# Patient Record
Sex: FEMALE | Race: WHITE | Employment: FULL TIME | URBAN - METROPOLITAN AREA
[De-identification: names, ages, dates, MRNs, and addresses within clinical notes are randomized per-mention and may not be internally consistent; named-entity substitution may affect disease eponyms.]

---

## 2018-10-13 ENCOUNTER — HOSPITAL ENCOUNTER (EMERGENCY)
Age: 43
Discharge: HOME | End: 2018-10-13
Attending: EMERGENCY MEDICINE

## 2018-10-13 VITALS
HEIGHT: 64 IN | TEMPERATURE: 97.9 F | RESPIRATION RATE: 18 BRPM | BODY MASS INDEX: 19.2 KG/M2 | SYSTOLIC BLOOD PRESSURE: 138 MMHG | HEART RATE: 79 BPM | WEIGHT: 112.43 LBS | DIASTOLIC BLOOD PRESSURE: 89 MMHG | OXYGEN SATURATION: 100 %

## 2018-10-13 DIAGNOSIS — J40 BRONCHITIS: Primary | ICD-10-CM

## 2018-10-13 DIAGNOSIS — J11.1 INFLUENZA-LIKE ILLNESS: ICD-10-CM

## 2018-10-13 LAB
RAPID INFLUENZA  B AGN: NEGATIVE
RAPID INFLUENZA A AGN: NEGATIVE

## 2018-10-13 PROCEDURE — 87804 INFLUENZA ASSAY W/OPTIC: CPT

## 2018-10-13 PROCEDURE — 99283 EMERGENCY DEPT VISIT LOW MDM: CPT

## 2018-10-13 RX ORDER — ALBUTEROL SULFATE 90 UG/1
2 AEROSOL, METERED RESPIRATORY (INHALATION) EVERY 6 HOURS PRN
Qty: 1 INHALER | Refills: 3 | Status: SHIPPED | OUTPATIENT
Start: 2018-10-13 | End: 2019-04-24 | Stop reason: ALTCHOICE

## 2018-10-13 ASSESSMENT — ENCOUNTER SYMPTOMS
COUGH: 1
EYE REDNESS: 0
RHINORRHEA: 0
SORE THROAT: 1
SHORTNESS OF BREATH: 0
ABDOMINAL PAIN: 0

## 2018-10-13 ASSESSMENT — PAIN DESCRIPTION - PAIN TYPE
TYPE: ACUTE PAIN
TYPE: ACUTE PAIN

## 2018-10-13 ASSESSMENT — PAIN DESCRIPTION - DESCRIPTORS
DESCRIPTORS: ACHING
DESCRIPTORS: ACHING

## 2018-10-13 ASSESSMENT — PAIN SCALES - GENERAL: PAINLEVEL_OUTOF10: 5

## 2018-10-13 ASSESSMENT — PAIN DESCRIPTION - PROGRESSION: CLINICAL_PROGRESSION: GRADUALLY WORSENING

## 2018-10-13 ASSESSMENT — PAIN DESCRIPTION - FREQUENCY: FREQUENCY: CONTINUOUS

## 2018-10-13 ASSESSMENT — PAIN DESCRIPTION - ONSET: ONSET: ON-GOING

## 2018-10-13 ASSESSMENT — PAIN - FUNCTIONAL ASSESSMENT: PAIN_FUNCTIONAL_ASSESSMENT: 0-10

## 2018-10-13 NOTE — ED PROVIDER NOTES
[facility]  eMERGENCY dEPARTMENT eNCOUnter      Pt Name: Cristopher Chacon  MRN: [id number]  [place] 1975  Date of evaluation: 10/13/2018  Provider: Grecia Glass MD    [address] [place] [address]       Chief Complaint   Patient presents with    Nasal Congestion     for past 3 days    Cough     congested for past 3 days    Hoarse     onset 6 days ago         HISTORY OF PRESENT ILLNESS   (Location/Symptom, Timing/Onset,Context/Setting, Quality, Duration, Modifying Factors, Severity)  Note limiting factors. Cristopher Chacon is a 39 y.o. female who presents to the emergency department with a several day history of fever, cough, sore throat, headache, body aches. She states 5-6 days ago she had a scratchy throat, lost her voice. For the last 3 days she has had fever, headache, cough, sore throat, body aches. She has not had her flu shot yet this year. She states she normally smokes one pack of cigarettes a day but has been too sick to smoke for the last few days. He denies any immunocompromised states, history of asthma, history of COPD or emphysema. Cough is non-productive. No hemoptysis. HPI    NursingNotes were reviewed. REVIEW OF SYSTEMS    (2-9 systems for level 4, 10 or more for level 5)     Review of Systems   Constitutional: Positive for fever. HENT: Positive for sore throat. Negative for rhinorrhea. Eyes: Negative for redness. Respiratory: Positive for cough. Negative for shortness of breath. Cardiovascular: Negative for chest pain. Chest sore - but only with cough   Gastrointestinal: Negative for abdominal pain. Genitourinary: Negative for flank pain. Musculoskeletal: Positive for myalgias. Neurological: Positive for headaches. Hematological: Negative for adenopathy. Psychiatric/Behavioral: Negative for confusion. Except as noted above the remainder of the review of systems was reviewed and negative.        PAST MEDICAL HISTORY patient's respiratory rate is 18 and her room air pulse ox is on her percent. At this time I do not suspect pneumonia, pleural effusion, pulmonary emboli, empyema. Her influenza test is negative and she is outside the window for treatment with Tamiflu. At this time she has a viral/influenza-like illness. No other further testing is indicated emergently. I do feel that she should treat herself symptomatically with Tylenol and Motrin for her fever and body aches. I have recommended that she stop smoking and provided her video about smoking triggers. Going to prescribe Proventil and an effort to improve her cough is most likely underlying issue causing her cough is bronchial inflammation from her viral illness and her smoking. CRITICAL CARE TIME   Total Critical Care time was 0 minutes, excluding separately reportable procedures. There was a high probability of clinically significant/life threatening deterioration in the patient's condition which required my urgent intervention. CONSULTS:  None    PROCEDURES:  Unless otherwise noted below, none     Procedures    FINAL IMPRESSION      1. Bronchitis    2. Influenza-like illness          DISPOSITION/PLAN   DISPOSITION Decision To Discharge 10/13/2018 01:31:13 PM      PATIENT REFERRED TO:  No follow-up provider specified.     DISCHARGE MEDICATIONS:  [unfilled]       (Please note that portions of this note were completed with a voice recognition program.Efforts were made to edit the dictations but occasionally words are mis-transcribed.)    Minerva Anderson MD (electronically signed)  Attending Emergency Physician          Minerva Anderson MD  10/13/18 [id number]

## 2019-04-24 ENCOUNTER — HOSPITAL ENCOUNTER (EMERGENCY)
Age: 44
Discharge: HOME | End: 2019-04-24
Attending: EMERGENCY MEDICINE

## 2019-04-24 VITALS
SYSTOLIC BLOOD PRESSURE: 131 MMHG | HEART RATE: 82 BPM | WEIGHT: 118.61 LBS | TEMPERATURE: 98.4 F | BODY MASS INDEX: 20.25 KG/M2 | RESPIRATION RATE: 15 BRPM | DIASTOLIC BLOOD PRESSURE: 95 MMHG | OXYGEN SATURATION: 94 % | HEIGHT: 64 IN

## 2019-04-24 DIAGNOSIS — S83.92XA SPRAIN OF LEFT KNEE, UNSPECIFIED LIGAMENT, INITIAL ENCOUNTER: Primary | ICD-10-CM

## 2019-04-24 PROCEDURE — 6360000002 HC RX W HCPCS: Performed by: EMERGENCY MEDICINE

## 2019-04-24 PROCEDURE — 99283 EMERGENCY DEPT VISIT LOW MDM: CPT

## 2019-04-24 RX ORDER — KETOROLAC TROMETHAMINE 30 MG/ML
30 INJECTION, SOLUTION INTRAMUSCULAR; INTRAVENOUS ONCE
Status: COMPLETED | OUTPATIENT
Start: 2019-04-24 | End: 2019-04-24

## 2019-04-24 RX ORDER — NAPROXEN 500 MG/1
500 TABLET ORAL 2 TIMES DAILY
Qty: 30 TABLET | Refills: 0 | Status: SHIPPED | OUTPATIENT
Start: 2019-04-24 | End: 2019-07-23 | Stop reason: ALTCHOICE

## 2019-04-24 RX ADMIN — KETOROLAC TROMETHAMINE 30 MG: 30 INJECTION, SOLUTION INTRAMUSCULAR; INTRAVENOUS at 10:40

## 2019-04-24 ASSESSMENT — PAIN DESCRIPTION - LOCATION: LOCATION: KNEE

## 2019-04-24 ASSESSMENT — PAIN DESCRIPTION - FREQUENCY: FREQUENCY: CONTINUOUS

## 2019-04-24 ASSESSMENT — PAIN SCALES - GENERAL
PAINLEVEL_OUTOF10: 5
PAINLEVEL_OUTOF10: 7
PAINLEVEL_OUTOF10: 7

## 2019-04-24 ASSESSMENT — PAIN DESCRIPTION - DESCRIPTORS: DESCRIPTORS: THROBBING;BURNING

## 2019-04-24 ASSESSMENT — PAIN DESCRIPTION - PAIN TYPE: TYPE: ACUTE PAIN

## 2019-04-24 ASSESSMENT — PAIN DESCRIPTION - ORIENTATION: ORIENTATION: RIGHT

## 2019-04-24 NOTE — ED TRIAGE NOTES
Pt fell over vacuum  cord yesterday and started to fall, pt caught herself and twisted her right knee in the process. No swelling or bruising noted. Pt able to bear weight on right leg.

## 2019-04-24 NOTE — ED PROVIDER NOTES
Emergency Department provider note:    [facility]    CHIEF COMPLAINT  Knee Injury (Left knee injury last night, pt fell over vacuum cord and caught herself and twisted knee)      HISTORY OF PRESENT ILLNESS  Bayron Bains is a 38 y.o. female who presents to the ED with an injury to her left knee. The patient states she's had problems with that torn ACL in that left knee, which she originally did 10 years ago. She's had no surgical interventions. Last night she was at home and caught her foot in an electric cord from her vacuum . She twisted the knee. This morning her knees hurting so much she is having difficulty walking. She is concerned because she needs a work note. She took some ibuprofen last night before going to bed, but hasn't had anything for pain this morning. Her pains a 6 out of 10. The pain is sharp in nature and increases when she tries to flex or extend the knee. It does not radiate. No distal numbness or paresthesias. She's had a prior history of a C6 fracture, and the strain of her left knee in the past.  She's had no other orthopedic problems. She is able to bear weight. No other complaints, modifying factors or associated symptoms. Nursing notes reviewed.    Past Medical History:   Diagnosis Date    Arthritis     h/o C6 fx, bulging disc on back    Chronic kidney disease     High blood pressure     Neck fracture (HCC)     Stroke [facility])      Past Surgical History:   Procedure Laterality Date    ABDOMEN SURGERY      exploratory     Family History   Problem Relation Age of Onset    Cancer Maternal Aunt         breast/lung    Arthritis Other     Diabetes Other     High Blood Pressure Other      Social History     Socioeconomic History    Marital status: Single     Spouse name: Not on file    Number of children: 2    Years of education: Not on file    Highest education level: Not on file   Occupational History    She works as a . NA   Social Needs    Financial resource strain: Not on file    Food insecurity:     Worry: Not on file     Inability: Not on file    Transportation needs:     Medical: Not on file     Non-medical: Not on file   Tobacco Use    Smoking status: Current Every Day Smoker     Packs/day: 1.00     Years: 20.00     Pack years: 20.00     Types: Cigarettes   Substance and Sexual Activity    Alcohol use: No    Drug use: No    Sexual activity: Yes     Partners: Male   Lifestyle    Physical activity:     Days per week: Not on file     Minutes per session: Not on file    Stress: Not on file   Relationships    Social connections:     Talks on phone: Not on file     Gets together: Not on file     Attends Congregational service: Not on file     Active member of club or organization: Not on file     Attends meetings of clubs or organizations: Not on file     Relationship status: Not on file    Intimate partner violence:     Fear of current or ex partner: Not on file     Emotionally abused: Not on file     Physically abused: Not on file     Forced sexual activity: Not on file   Other Topics Concern    Not on file   Social History Narrative    Not on file     No current facility-administered medications for this encounter. Current Outpatient Medications   Medication Sig Dispense Refill    naproxen (NAPROSYN) 500 MG tablet Take 1 tablet by mouth 2 times daily 30 tablet 0    ibuprofen (ADVIL;MOTRIN) 200 MG tablet Take 200 mg by mouth every 6 hours as needed for Pain       Allergies   Allergen Reactions    Tramadol Nausea Only       REVIEW OF SYSTEMS  10 systems reviewed, pertinent positives per HPI otherwise noted to be negative    PHYSICAL EXAM  BP (!) 131/95   Pulse 82   Temp 98.4 °F (36.9 °C) (Oral)   Resp 15   Ht 5' 4\" (1.626 m)   Wt 118 lb 9.7 oz (53.8 kg)   SpO2 94%   BMI 20.36 kg/m²   GENERAL APPEARANCE: Awake and alert. Cooperative. No acute distress. Arrived ambulatory with a limp.   HEAD: Normocephalic. Atraumatic. EYES: EOM's grossly intact. ENT: Mucous membranes are moist.   NECK: Supple. Normal ROM. CHEST: Equal symmetric chest rise. LUNGS: Breathing is unlabored. Speaking comfortably in full sentences. HEART:  Regular rhythm. ABDOMEN: Nondistended. No masses. EXTREMITIES: Moves actively. No acute deformities. Her pain is limited to her left knee area. I see no signs of trauma. She's got a little swelling along the patellar tendon and the lower pole medial aspect of the patella. That is the site of her pain. She does not want to flex or extend her knee. There is no trauma to the knee, no joint effusion. She has no crepitance when I palpate any of the bones of the knee or the patella. Palpation of the edge of the patella down onto the medial aspect of the patellar tendon reproduces her pain. Having her flex in that area produces a little crepitance. Again there is no joint effusion. She has no tenderness along the collateral ligaments. She doesn't have any gross instability anterior or posterior, but I cannot evaluate it fully due to her pain. Distal pulses, capillary refill, and sensation are all intact. SKIN: Warm and dry. NEUROLOGICAL: Alert and oriented. Strength is 5/5 in all extremities and sensation is intact. LABS  No results found for this visit on 04/24/19. RADIOLOGY  No orders to display         ED COURSE/MDM  Patient seen and evaluated. Patient was evaluated and found to have a tendinitis or strain along the left patellar tendon. She also may have some irritation of the cartilage in that area and I explained that to the patient. She has no indication of a collateral ligament strain, nor an ACL strain. She has no gross laxity and no joint effusion. I think she will be more comfortable if she has a knee brace in place. She is one of those in the past and it helped her.   Advised to use heat and will give her an injection of an anti-inflammatory pain medicine, and a prescription for Naprosyn to use at home. I think she needs follow up with orthopedic surgery which she has not done in the past.  I gave her follow-up to our orthopedic surgeon on call, Dr. Clemente Garcia. She can use the splint for the next couple weeks with heat. A return to work note is given for tomorrow. I considered potential for bony injury or ligamentous tear. Neither are present. Patient was given:   Medications   ketorolac (TORADOL) injection 30 mg (30 mg Intramuscular Given 4/24/19 1040)       Pt is to follow up in 7 days for a recheck. Patient was given scripts for the following medications. I counseled patient how to take these medications. New Prescriptions    NAPROXEN (NAPROSYN) 500 MG TABLET    Take 1 tablet by mouth 2 times daily           CLINICAL IMPRESSION  1. Sprain of left knee, unspecified ligament, initial encounter        Blood pressure (!) 131/95, pulse 82, temperature 98.4 °F (36.9 °C), temperature source Oral, resp. rate 15, height 5' 4\" (1.626 m), weight 118 lb 9.7 oz (53.8 kg), SpO2 94 %, not currently breastfeeding. DISPOSITION Decision To Discharge 04/24/2019 10:29:03 AM      Comment: Please note this report has been produced using speech recognition software and may contain errors related to that system including errors in grammar, punctuation, and spelling, as well as words and phrases that may be inappropriate. If there are any questions or concerns please feel free to contact the dictating provider for clarification.         Joelle Bocanegra MD  04/24/19 0246 [address], [place]  04/24/19 5834

## 2019-07-23 ENCOUNTER — HOSPITAL ENCOUNTER (OUTPATIENT)
Dept: GENERAL RADIOLOGY | Age: 44
Discharge: HOME | End: 2019-07-23
Payer: COMMERCIAL

## 2019-07-23 ENCOUNTER — OFFICE VISIT (OUTPATIENT)
Dept: INTERNAL MEDICINE CLINIC | Age: 44
End: 2019-07-23
Payer: COMMERCIAL

## 2019-07-23 ENCOUNTER — HOSPITAL ENCOUNTER (OUTPATIENT)
Age: 44
Discharge: HOME | End: 2019-07-23
Payer: COMMERCIAL

## 2019-07-23 VITALS
OXYGEN SATURATION: 98 % | HEART RATE: 86 BPM | BODY MASS INDEX: 19.84 KG/M2 | WEIGHT: 116.2 LBS | SYSTOLIC BLOOD PRESSURE: 128 MMHG | HEIGHT: 64 IN | DIASTOLIC BLOOD PRESSURE: 84 MMHG | TEMPERATURE: 98.5 F

## 2019-07-23 DIAGNOSIS — Z72.0 TOBACCO ABUSE: ICD-10-CM

## 2019-07-23 DIAGNOSIS — S83.512A RUPTURE OF ANTERIOR CRUCIATE LIGAMENT OF LEFT KNEE, INITIAL ENCOUNTER: ICD-10-CM

## 2019-07-23 DIAGNOSIS — M19.90 ARTHRITIS: ICD-10-CM

## 2019-07-23 DIAGNOSIS — M19.90 ARTHRITIS: Primary | ICD-10-CM

## 2019-07-23 DIAGNOSIS — M77.11 LATERAL EPICONDYLITIS OF RIGHT ELBOW: ICD-10-CM

## 2019-07-23 LAB
BASOPHILS ABSOLUTE: 0.1 K/UL (ref 0–0.2)
BASOPHILS RELATIVE PERCENT: 0.6 %
EOSINOPHILS ABSOLUTE: 0.1 K/UL (ref 0–0.6)
EOSINOPHILS RELATIVE PERCENT: 1.3 %
HCT VFR BLD CALC: 40.1 % (ref 36–48)
HEMOGLOBIN: 13.4 G/DL (ref 12–16)
LYMPHOCYTES ABSOLUTE: 1.8 K/UL (ref 1–5.1)
LYMPHOCYTES RELATIVE PERCENT: 20.1 %
MCH RBC QN AUTO: 32.8 PG (ref 26–34)
MCHC RBC AUTO-ENTMCNC: 33.5 G/DL (ref 31–36)
MCV RBC AUTO: 98 FL (ref 80–100)
MONOCYTES ABSOLUTE: 0.4 K/UL (ref 0–1.3)
MONOCYTES RELATIVE PERCENT: 4.5 %
NEUTROPHILS ABSOLUTE: 6.6 K/UL (ref 1.7–7.7)
NEUTROPHILS RELATIVE PERCENT: 73.5 %
PDW BLD-RTO: 13.8 % (ref 12.4–15.4)
PLATELET # BLD: 217 K/UL (ref 135–450)
PMV BLD AUTO: 8.2 FL (ref 5–10.5)
RBC # BLD: 4.09 M/UL (ref 4–5.2)
RHEUMATOID FACTOR: <10 IU/ML
SEDIMENTATION RATE, ERYTHROCYTE: 8 MM/HR (ref 0–20)
TSH SERPL DL<=0.05 MIU/L-ACNC: 1.24 UIU/ML (ref 0.27–4.2)
WBC # BLD: 9 K/UL (ref 4–11)

## 2019-07-23 PROCEDURE — 85025 COMPLETE CBC W/AUTO DIFF WBC: CPT

## 2019-07-23 PROCEDURE — G8427 DOCREV CUR MEDS BY ELIG CLIN: HCPCS | Performed by: INTERNAL MEDICINE

## 2019-07-23 PROCEDURE — 86038 ANTINUCLEAR ANTIBODIES: CPT

## 2019-07-23 PROCEDURE — 73130 X-RAY EXAM OF HAND: CPT

## 2019-07-23 PROCEDURE — 85652 RBC SED RATE AUTOMATED: CPT

## 2019-07-23 PROCEDURE — G8420 CALC BMI NORM PARAMETERS: HCPCS | Performed by: INTERNAL MEDICINE

## 2019-07-23 PROCEDURE — 84443 ASSAY THYROID STIM HORMONE: CPT

## 2019-07-23 PROCEDURE — 86431 RHEUMATOID FACTOR QUANT: CPT

## 2019-07-23 PROCEDURE — 36415 COLL VENOUS BLD VENIPUNCTURE: CPT

## 2019-07-23 PROCEDURE — 4004F PT TOBACCO SCREEN RCVD TLK: CPT | Performed by: INTERNAL MEDICINE

## 2019-07-23 PROCEDURE — 99204 OFFICE O/P NEW MOD 45 MIN: CPT | Performed by: INTERNAL MEDICINE

## 2019-07-23 ASSESSMENT — PATIENT HEALTH QUESTIONNAIRE - PHQ9
SUM OF ALL RESPONSES TO PHQ9 QUESTIONS 1 & 2: 2
SUM OF ALL RESPONSES TO PHQ QUESTIONS 1-9: 2
2. FEELING DOWN, DEPRESSED OR HOPELESS: 2
SUM OF ALL RESPONSES TO PHQ QUESTIONS 1-9: 2
1. LITTLE INTEREST OR PLEASURE IN DOING THINGS: 0

## 2019-07-23 ASSESSMENT — ENCOUNTER SYMPTOMS
COUGH: 1
GASTROINTESTINAL NEGATIVE: 1
SHORTNESS OF BREATH: 1

## 2019-07-23 NOTE — PROGRESS NOTES
Comment: breast/lung  Problem: Arthritis      Relation: Other          Age of Onset: (Not Specified)  Problem: Diabetes      Relation: Other          Age of Onset: (Not Specified)  Problem: High Blood Pressure      Relation: Other          Age of Onset: (Not Specified)  Problem: Stroke      Relation: Sister          Age of Onset: 27  Problem: Cancer      Relation: Sister          Age of Onset: (Not Specified)          Comment: Lupus  Problem: Heart Disease      Relation: Brother          Age of Onset: (Not Specified)  Problem: Heart Attack      Relation: Brother          Age of Onset: (Not Specified)  Problem: COPD      Relation: Maternal Grandmother          Age of Onset: (Not Specified)  Problem: No Known Problems      Relation: Maternal Grandfather          Age of Onset: (Not Specified)  Problem: No Known Problems      Relation: Paternal Grandmother          Age of Onset: (Not Specified)  Problem: No Known Problems      Relation: Paternal Grandfather          Age of Onset: (Not Specified)  Problem: Heart Disease      Relation: Sister          Age of Onset: (Not Specified)  Problem: Breast Cancer      Relation: Maternal Aunt          Age of Onset: (Not Specified)    Social History    Tobacco Use      Smoking status: Current Every Day Smoker        Packs/day: 1.00        Years: 20.00        Pack years: 20        Types: Cigarettes      Smokeless tobacco: Never Used    Alcohol use: No      Hand Pain    The incident occurred more than 1 week ago. There was no injury mechanism. The pain is present in the right hand, right fingers, left fingers and left hand. The quality of the pain is described as aching. The pain is moderate. The pain has been constant since the incident. She has tried NSAIDs for the symptoms. The treatment provided no relief. Knee Pain    The incident occurred more than 1 week ago. The injury mechanism was a direct blow. The pain is present in the left knee.  The quality of the pain is described as aching. The pain is moderate. The pain has been constant since onset. Review of Systems   Constitutional: Negative for appetite change. HENT: Negative. Respiratory: Positive for cough and shortness of breath. Cardiovascular: Negative. Gastrointestinal: Negative. Genitourinary: Negative. Musculoskeletal: Positive for arthralgias and neck pain. Skin: Negative. Neurological: Negative. Objective:   Physical Exam   HENT:   Head: Normocephalic. Right Ear: External ear normal.   Left Ear: External ear normal.   Nose: Nose normal.   Mouth/Throat: Oropharynx is clear and moist. No oropharyngeal exudate. Eyes: Pupils are equal, round, and reactive to light. Conjunctivae and EOM are normal. No scleral icterus. Neck: Normal range of motion. No thyromegaly present. Cardiovascular: Normal rate and regular rhythm. No murmur heard. Pulmonary/Chest: Effort normal. She has decreased breath sounds. She has no wheezes. Abdominal: Soft. Bowel sounds are normal. There is no tenderness. Musculoskeletal: She exhibits no edema or tenderness. Rt elbow lateral epichodylitis. Lt knee painful tender and sore. Lymphadenopathy:     She has no cervical adenopathy. Neurological: She is alert. Skin: Skin is warm. She is not diaphoretic. Assessment:      Encounter Diagnoses   Name Primary?  Arthritis Yes    Rupture of anterior cruciate ligament of left knee, initial encounter     Lateral epicondylitis of right elbow     Tobacco abuse            Plan:      Nikhil Gallego was seen today for established new doctor, swelling, hand pain, knee pain and elbow pain. Diagnoses and all orders for this visit:    Arthritis  -     JOSE ROBERTO profile  -     Rheumatoid Factor  -     Sedimentation Rate; Future  -     TSH without Reflex  -     CBC with Differential  -     XR HAND RIGHT (MIN 3 VIEWS); Future  -     XR HAND LEFT (MIN 3 VIEWS); Future  -     diclofenac (VOLTAREN) 50 MG EC tablet;  Take

## 2019-07-24 ENCOUNTER — OFFICE VISIT (OUTPATIENT)
Dept: ORTHOPEDIC SURGERY | Age: 44
End: 2019-07-24
Payer: COMMERCIAL

## 2019-07-24 VITALS
BODY MASS INDEX: 19.81 KG/M2 | HEART RATE: 73 BPM | SYSTOLIC BLOOD PRESSURE: 151 MMHG | DIASTOLIC BLOOD PRESSURE: 102 MMHG | WEIGHT: 116 LBS | HEIGHT: 64 IN

## 2019-07-24 DIAGNOSIS — S83.512A RUPTURE OF ANTERIOR CRUCIATE LIGAMENT OF LEFT KNEE, INITIAL ENCOUNTER: Primary | ICD-10-CM

## 2019-07-24 DIAGNOSIS — M25.562 ACUTE PAIN OF LEFT KNEE: ICD-10-CM

## 2019-07-24 LAB — ANTI-NUCLEAR ANTIBODY (ANA): NEGATIVE

## 2019-07-24 PROCEDURE — G8420 CALC BMI NORM PARAMETERS: HCPCS | Performed by: ORTHOPAEDIC SURGERY

## 2019-07-24 PROCEDURE — 99243 OFF/OP CNSLTJ NEW/EST LOW 30: CPT | Performed by: ORTHOPAEDIC SURGERY

## 2019-07-24 PROCEDURE — G8427 DOCREV CUR MEDS BY ELIG CLIN: HCPCS | Performed by: ORTHOPAEDIC SURGERY

## 2019-07-24 NOTE — PROGRESS NOTES
Aunt        Social History     Socioeconomic History    Marital status: Single     Spouse name: None    Number of children: 2    Years of education: None    Highest education level: None   Occupational History    Occupation: NA   Social Needs    Financial resource strain: None    Food insecurity:     Worry: None     Inability: None    Transportation needs:     Medical: None     Non-medical: None   Tobacco Use    Smoking status: Current Every Day Smoker     Packs/day: 1.00     Years: 20.00     Pack years: 20.00     Types: Cigarettes    Smokeless tobacco: Never Used   Substance and Sexual Activity    Alcohol use: No    Drug use: Not Currently    Sexual activity: Yes     Partners: Male   Lifestyle    Physical activity:     Days per week: None     Minutes per session: None    Stress: None   Relationships    Social connections:     Talks on phone: None     Gets together: None     Attends Baptism service: None     Active member of club or organization: None     Attends meetings of clubs or organizations: None     Relationship status: None    Intimate partner violence:     Fear of current or ex partner: None     Emotionally abused: None     Physically abused: None     Forced sexual activity: None   Other Topics Concern    None   Social History Narrative    None       Current Outpatient Medications   Medication Sig Dispense Refill    diclofenac (VOLTAREN) 50 MG EC tablet Take 1 tablet by mouth 2 times daily 60 tablet 3    ibuprofen (ADVIL;MOTRIN) 200 MG tablet Take 200 mg by mouth every 6 hours as needed for Pain       No current facility-administered medications for this visit. Allergies   Allergen Reactions    Tramadol Nausea Only       Review of Systems:  Pertinent items are noted in HPI. 13 point review of systems from Patient History Form dated 7/24/19 and available in the patient's chart under the Media tab.         Physical Examination:     Vital signs:   BP (!) 157/102   Pulse 73 consider ACL reconstruction, she will need to quit smoking. Follow up after MRI.

## 2019-08-01 ENCOUNTER — HOSPITAL ENCOUNTER (OUTPATIENT)
Dept: MRI IMAGING | Age: 44
Discharge: HOME | End: 2019-08-01
Payer: COMMERCIAL

## 2019-08-01 DIAGNOSIS — S83.512A RUPTURE OF ANTERIOR CRUCIATE LIGAMENT OF LEFT KNEE, INITIAL ENCOUNTER: ICD-10-CM

## 2019-08-01 PROCEDURE — 73721 MRI JNT OF LWR EXTRE W/O DYE: CPT

## 2019-08-05 ENCOUNTER — OFFICE VISIT (OUTPATIENT)
Dept: ORTHOPEDIC SURGERY | Age: 44
End: 2019-08-05
Payer: COMMERCIAL

## 2019-08-05 VITALS
HEIGHT: 64 IN | BODY MASS INDEX: 19.81 KG/M2 | RESPIRATION RATE: 16 BRPM | SYSTOLIC BLOOD PRESSURE: 135 MMHG | WEIGHT: 116 LBS | DIASTOLIC BLOOD PRESSURE: 91 MMHG

## 2019-08-05 DIAGNOSIS — S83.512A RUPTURE OF ANTERIOR CRUCIATE LIGAMENT OF LEFT KNEE, INITIAL ENCOUNTER: Primary | ICD-10-CM

## 2019-08-05 DIAGNOSIS — Z72.0 TOBACCO USE: ICD-10-CM

## 2019-08-05 PROCEDURE — L1832 KO ADJ JNT POS R SUP PRE CST: HCPCS | Performed by: ORTHOPAEDIC SURGERY

## 2019-08-05 PROCEDURE — 4004F PT TOBACCO SCREEN RCVD TLK: CPT | Performed by: ORTHOPAEDIC SURGERY

## 2019-08-05 PROCEDURE — G8427 DOCREV CUR MEDS BY ELIG CLIN: HCPCS | Performed by: ORTHOPAEDIC SURGERY

## 2019-08-05 PROCEDURE — 99214 OFFICE O/P EST MOD 30 MIN: CPT | Performed by: ORTHOPAEDIC SURGERY

## 2019-08-05 PROCEDURE — G8420 CALC BMI NORM PARAMETERS: HCPCS | Performed by: ORTHOPAEDIC SURGERY

## 2019-08-05 NOTE — PROGRESS NOTES
surgery, it can take three months, average is six to nine months, and it can be up to a year depending on the patient's commitment to PT post-op. We discussed the higher likelihood of suboptimal outcomes if the patient is noncompliant with postoperative rehabilitation. Plan for left knee arthroscopy, ACL reconstruction with Achilles allograft        Procedures    Breg T-Scope Knee Brace     Patient was prescribed a Breg T-Scope Knee Brace. The left knee will require stabilization / immobilization from this semi-rigid / rigid orthosis to improve their function. The orthosis will assist in protecting the affected area, provide functional support and facilitate healing. The prefabricated orthosis was modified in the following manner to provide a customizable fit for the patient at the time of delivery. 1.  Identification of appropriate positioning and alignment of anatomical landmarks. 2.  Trimming of straps and adjustment of frame to specifically fit patient. 3.  Polycentric hinge adjustment in flexion and extension. The patient was educated and fit by a healthcare professional with expert knowledge and specialization in brace application while under the direct supervision of the treating physician. Verbal and written instructions for the use of and application of this item were provided. They were instructed to contact the office immediately should the brace result in increased pain, decreased sensation, increased swelling or worsening of the condition. Provided for day of surgery. The patient will see their PCP for H&P and clearance for surgery and for smoking cessation.

## 2019-08-08 ENCOUNTER — TELEPHONE (OUTPATIENT)
Dept: ORTHOPEDIC SURGERY | Age: 44
End: 2019-08-08

## 2019-08-15 ENCOUNTER — OFFICE VISIT (OUTPATIENT)
Dept: INTERNAL MEDICINE CLINIC | Age: 44
End: 2019-08-15
Payer: COMMERCIAL

## 2019-08-15 VITALS
SYSTOLIC BLOOD PRESSURE: 122 MMHG | WEIGHT: 119 LBS | HEIGHT: 64 IN | TEMPERATURE: 98.1 F | HEART RATE: 68 BPM | DIASTOLIC BLOOD PRESSURE: 80 MMHG | BODY MASS INDEX: 20.32 KG/M2 | OXYGEN SATURATION: 95 %

## 2019-08-15 DIAGNOSIS — Z72.0 TOBACCO ABUSE: ICD-10-CM

## 2019-08-15 DIAGNOSIS — G89.29 CHRONIC PAIN OF LEFT KNEE: ICD-10-CM

## 2019-08-15 DIAGNOSIS — M25.562 CHRONIC PAIN OF LEFT KNEE: ICD-10-CM

## 2019-08-15 DIAGNOSIS — Z01.818 PREOPERATIVE EXAMINATION: Primary | ICD-10-CM

## 2019-08-15 DIAGNOSIS — J42 CHRONIC BRONCHITIS, UNSPECIFIED CHRONIC BRONCHITIS TYPE (HCC): ICD-10-CM

## 2019-08-15 PROCEDURE — 3023F SPIROM DOC REV: CPT | Performed by: INTERNAL MEDICINE

## 2019-08-15 PROCEDURE — G8427 DOCREV CUR MEDS BY ELIG CLIN: HCPCS | Performed by: INTERNAL MEDICINE

## 2019-08-15 PROCEDURE — 4004F PT TOBACCO SCREEN RCVD TLK: CPT | Performed by: INTERNAL MEDICINE

## 2019-08-15 PROCEDURE — G8420 CALC BMI NORM PARAMETERS: HCPCS | Performed by: INTERNAL MEDICINE

## 2019-08-15 PROCEDURE — G8926 SPIRO NO PERF OR DOC: HCPCS | Performed by: INTERNAL MEDICINE

## 2019-08-15 PROCEDURE — 99214 OFFICE O/P EST MOD 30 MIN: CPT | Performed by: INTERNAL MEDICINE

## 2019-08-15 RX ORDER — VARENICLINE TARTRATE 25 MG
KIT ORAL
Qty: 1 BOX | Refills: 3 | Status: SHIPPED | OUTPATIENT
Start: 2019-08-15 | End: 2019-09-11

## 2019-08-15 ASSESSMENT — ENCOUNTER SYMPTOMS
SINUS PRESSURE: 1
SHORTNESS OF BREATH: 1
GASTROINTESTINAL NEGATIVE: 1

## 2019-08-15 NOTE — PATIENT INSTRUCTIONS
Please call your pharmacy if you need any refills of your medication(s). Please call our office at [phone] if you don't hear from us about your test results. Bring an accurate list of your medications with you at every appointment to ensure that we have the correct information.     Our office hours are: Monday - Friday 8:30 am- 5 pm    Phone lines turn on at 8:30 am

## 2019-08-29 ENCOUNTER — NURSE TRIAGE (OUTPATIENT)
Dept: OTHER | Facility: CLINIC | Age: 44
End: 2019-08-29

## 2019-08-29 ENCOUNTER — OFFICE VISIT (OUTPATIENT)
Dept: INTERNAL MEDICINE CLINIC | Age: 44
End: 2019-08-29
Payer: COMMERCIAL

## 2019-08-29 VITALS
WEIGHT: 119.38 LBS | BODY MASS INDEX: 20.49 KG/M2 | DIASTOLIC BLOOD PRESSURE: 78 MMHG | OXYGEN SATURATION: 97 % | TEMPERATURE: 98 F | HEART RATE: 73 BPM | SYSTOLIC BLOOD PRESSURE: 114 MMHG

## 2019-08-29 DIAGNOSIS — H60.392 INFECTION OF LEFT EARLOBE: Primary | ICD-10-CM

## 2019-08-29 PROCEDURE — G8420 CALC BMI NORM PARAMETERS: HCPCS | Performed by: INTERNAL MEDICINE

## 2019-08-29 PROCEDURE — 99213 OFFICE O/P EST LOW 20 MIN: CPT | Performed by: INTERNAL MEDICINE

## 2019-08-29 PROCEDURE — 4130F TOPICAL PREP RX AOE: CPT | Performed by: INTERNAL MEDICINE

## 2019-08-29 PROCEDURE — G8427 DOCREV CUR MEDS BY ELIG CLIN: HCPCS | Performed by: INTERNAL MEDICINE

## 2019-08-29 PROCEDURE — 4004F PT TOBACCO SCREEN RCVD TLK: CPT | Performed by: INTERNAL MEDICINE

## 2019-08-29 RX ORDER — DOXYCYCLINE HYCLATE 100 MG/1
100 CAPSULE ORAL 2 TIMES DAILY
Qty: 14 CAPSULE | Refills: 0 | Status: SHIPPED | OUTPATIENT
Start: 2019-08-29 | End: 2019-09-05

## 2019-08-29 ASSESSMENT — ENCOUNTER SYMPTOMS
STRIDOR: 0
EYES NEGATIVE: 1
COLOR CHANGE: 1
SINUS PRESSURE: 0
COUGH: 1
SHORTNESS OF BREATH: 1
BACK PAIN: 0
CHOKING: 0
APNEA: 0

## 2019-08-29 NOTE — PROGRESS NOTES
of breath. Negative for apnea, choking and stridor. Genitourinary: Negative for hematuria. Musculoskeletal: Negative for back pain. Skin: Positive for color change and wound. Negative for pallor. Left ear lobe. Neurological: Negative for dizziness, tremors, seizures and syncope. Hematological: Does not bruise/bleed easily. Psychiatric/Behavioral: Negative for confusion, decreased concentration and dysphoric mood. Objective:   Physical Exam   HENT:   Head: Normocephalic. Right Ear: External ear normal.   Left Ear: External ear normal.   Nose: Nose normal.   Mouth/Throat: Oropharynx is clear and moist. No oropharyngeal exudate. Eyes: Pupils are equal, round, and reactive to light. Conjunctivae and EOM are normal. No scleral icterus. Neck: Normal range of motion. No thyromegaly present. Cardiovascular: Normal rate and regular rhythm. No murmur heard. Pulmonary/Chest: Effort normal. She has decreased breath sounds. She has no wheezes. She has rhonchi. Abdominal: Soft. Bowel sounds are normal. There is no tenderness. Musculoskeletal: She exhibits no edema or tenderness. Lymphadenopathy:     She has no cervical adenopathy. Neurological: She is alert. Skin: Skin is warm. She is not diaphoretic. Assessment:      Encounter Diagnosis   Name Primary?  Infection of left earlobe Yes           Plan:      Chelsea Berry was seen today for mass. Diagnoses and all orders for this visit:    Infection of left earlobe    Other orders  -     doxycycline hyclate (VIBRAMYCIN) 100 MG capsule;  Take 1 capsule by mouth 2 times daily for 7 days              Thai Mann MD

## 2019-09-12 ENCOUNTER — ANESTHESIA EVENT (OUTPATIENT)
Dept: OPERATING ROOM | Age: 44
End: 2019-09-12
Payer: COMMERCIAL

## 2019-09-13 ENCOUNTER — ANESTHESIA (OUTPATIENT)
Dept: OPERATING ROOM | Age: 44
End: 2019-09-13
Payer: COMMERCIAL

## 2019-09-13 ENCOUNTER — HOSPITAL ENCOUNTER (OUTPATIENT)
Age: 44
Setting detail: OUTPATIENT SURGERY
Discharge: HOME | End: 2019-09-13
Attending: ORTHOPAEDIC SURGERY | Admitting: ORTHOPAEDIC SURGERY
Payer: COMMERCIAL

## 2019-09-13 VITALS
BODY MASS INDEX: 20.7 KG/M2 | SYSTOLIC BLOOD PRESSURE: 155 MMHG | RESPIRATION RATE: 18 BRPM | HEIGHT: 64 IN | TEMPERATURE: 97.4 F | OXYGEN SATURATION: 97 % | WEIGHT: 121.25 LBS | HEART RATE: 79 BPM | DIASTOLIC BLOOD PRESSURE: 100 MMHG

## 2019-09-13 VITALS
SYSTOLIC BLOOD PRESSURE: 90 MMHG | OXYGEN SATURATION: 100 % | DIASTOLIC BLOOD PRESSURE: 56 MMHG | RESPIRATION RATE: 6 BRPM | TEMPERATURE: 97 F

## 2019-09-13 DIAGNOSIS — S83.512A RUPTURE OF ANTERIOR CRUCIATE LIGAMENT OF LEFT KNEE, INITIAL ENCOUNTER: Primary | ICD-10-CM

## 2019-09-13 LAB — PREGNANCY, URINE: NEGATIVE

## 2019-09-13 PROCEDURE — 7100000000 HC PACU RECOVERY - FIRST 15 MIN: Performed by: ORTHOPAEDIC SURGERY

## 2019-09-13 PROCEDURE — 3700000000 HC ANESTHESIA ATTENDED CARE: Performed by: ORTHOPAEDIC SURGERY

## 2019-09-13 PROCEDURE — 2500000003 HC RX 250 WO HCPCS: Performed by: NURSE ANESTHETIST, CERTIFIED REGISTERED

## 2019-09-13 PROCEDURE — 3600000014 HC SURGERY LEVEL 4 ADDTL 15MIN: Performed by: ORTHOPAEDIC SURGERY

## 2019-09-13 PROCEDURE — 64447 NJX AA&/STRD FEMORAL NRV IMG: CPT | Performed by: ANESTHESIOLOGY

## 2019-09-13 PROCEDURE — 3600000004 HC SURGERY LEVEL 4 BASE: Performed by: ORTHOPAEDIC SURGERY

## 2019-09-13 PROCEDURE — 7100000001 HC PACU RECOVERY - ADDTL 15 MIN: Performed by: ORTHOPAEDIC SURGERY

## 2019-09-13 PROCEDURE — G0480 DRUG TEST DEF 1-7 CLASSES: HCPCS

## 2019-09-13 PROCEDURE — C1762 CONN TISS, HUMAN(INC FASCIA): HCPCS | Performed by: ORTHOPAEDIC SURGERY

## 2019-09-13 PROCEDURE — 7100000010 HC PHASE II RECOVERY - FIRST 15 MIN: Performed by: ORTHOPAEDIC SURGERY

## 2019-09-13 PROCEDURE — 2709999900 HC NON-CHARGEABLE SUPPLY: Performed by: ORTHOPAEDIC SURGERY

## 2019-09-13 PROCEDURE — 2580000003 HC RX 258: Performed by: ORTHOPAEDIC SURGERY

## 2019-09-13 PROCEDURE — 3700000001 HC ADD 15 MINUTES (ANESTHESIA): Performed by: ORTHOPAEDIC SURGERY

## 2019-09-13 PROCEDURE — C1713 ANCHOR/SCREW BN/BN,TIS/BN: HCPCS | Performed by: ORTHOPAEDIC SURGERY

## 2019-09-13 PROCEDURE — 84703 CHORIONIC GONADOTROPIN ASSAY: CPT

## 2019-09-13 PROCEDURE — 2580000003 HC RX 258: Performed by: ANESTHESIOLOGY

## 2019-09-13 PROCEDURE — 6360000002 HC RX W HCPCS: Performed by: NURSE ANESTHETIST, CERTIFIED REGISTERED

## 2019-09-13 PROCEDURE — 6360000002 HC RX W HCPCS: Performed by: ANESTHESIOLOGY

## 2019-09-13 PROCEDURE — 2720000010 HC SURG SUPPLY STERILE: Performed by: ORTHOPAEDIC SURGERY

## 2019-09-13 PROCEDURE — 29881 ARTHRS KNE SRG MNISECTMY M/L: CPT | Performed by: ORTHOPAEDIC SURGERY

## 2019-09-13 PROCEDURE — 2580000003 HC RX 258: Performed by: NURSE ANESTHETIST, CERTIFIED REGISTERED

## 2019-09-13 PROCEDURE — 29888 ARTHRS AID ACL RPR/AGMNTJ: CPT | Performed by: ORTHOPAEDIC SURGERY

## 2019-09-13 PROCEDURE — 7100000011 HC PHASE II RECOVERY - ADDTL 15 MIN: Performed by: ORTHOPAEDIC SURGERY

## 2019-09-13 DEVICE — ANCHOR SUT 4.75X22MM PEEK DBL LD SWIVELOCK W/ TIGERTAIL: Type: IMPLANTABLE DEVICE | Site: KNEE | Status: FUNCTIONAL

## 2019-09-13 DEVICE — FAST-FIX 360 CURVED MENISCAL                                    REPAIR SYSTEM
Type: IMPLANTABLE DEVICE | Site: LEG | Status: FUNCTIONAL
Brand: FAST-FIX

## 2019-09-13 DEVICE — ANCHOR SFT TISS W/ DEPLOYING SUT BTB TIGHTROPE: Type: IMPLANTABLE DEVICE | Site: LEG | Status: FUNCTIONAL

## 2019-09-13 DEVICE — GRAFT HUM TISS L160MM ACHILLES TEND FRZN W/ BNE BLK: Type: IMPLANTABLE DEVICE | Site: LEG | Status: FUNCTIONAL

## 2019-09-13 DEVICE — SCREW INTFR L20MM DIA9MM BIOCOMPOSITE FASTTHREAD: Type: IMPLANTABLE DEVICE | Site: KNEE | Status: FUNCTIONAL

## 2019-09-13 RX ORDER — SODIUM CHLORIDE 0.9 % (FLUSH) 0.9 %
10 SYRINGE (ML) INJECTION PRN
Status: DISCONTINUED | OUTPATIENT
Start: 2019-09-13 | End: 2019-09-13 | Stop reason: HOSPADM

## 2019-09-13 RX ORDER — OXYCODONE HYDROCHLORIDE AND ACETAMINOPHEN 5; 325 MG/1; MG/1
1 TABLET ORAL PRN
Status: DISCONTINUED | OUTPATIENT
Start: 2019-09-13 | End: 2019-09-13 | Stop reason: HOSPADM

## 2019-09-13 RX ORDER — FENTANYL CITRATE 50 UG/ML
25 INJECTION, SOLUTION INTRAMUSCULAR; INTRAVENOUS EVERY 5 MIN PRN
Status: COMPLETED | OUTPATIENT
Start: 2019-09-13 | End: 2019-09-13

## 2019-09-13 RX ORDER — OXYCODONE HYDROCHLORIDE AND ACETAMINOPHEN 5; 325 MG/1; MG/1
1 TABLET ORAL EVERY 4 HOURS PRN
Qty: 40 TABLET | Refills: 0 | Status: SHIPPED | OUTPATIENT
Start: 2019-09-13 | End: 2019-09-20

## 2019-09-13 RX ORDER — POVIDONE-IODINE 10 MG/G
OINTMENT TOPICAL
Status: COMPLETED | OUTPATIENT
Start: 2019-09-13 | End: 2019-09-13

## 2019-09-13 RX ORDER — DEXAMETHASONE SODIUM PHOSPHATE 4 MG/ML
INJECTION, SOLUTION INTRA-ARTICULAR; INTRALESIONAL; INTRAMUSCULAR; INTRAVENOUS; SOFT TISSUE PRN
Status: DISCONTINUED | OUTPATIENT
Start: 2019-09-13 | End: 2019-09-13 | Stop reason: SDUPTHER

## 2019-09-13 RX ORDER — PROPOFOL 10 MG/ML
INJECTION, EMULSION INTRAVENOUS PRN
Status: DISCONTINUED | OUTPATIENT
Start: 2019-09-13 | End: 2019-09-13 | Stop reason: SDUPTHER

## 2019-09-13 RX ORDER — ESMOLOL HYDROCHLORIDE 10 MG/ML
INJECTION INTRAVENOUS PRN
Status: DISCONTINUED | OUTPATIENT
Start: 2019-09-13 | End: 2019-09-13 | Stop reason: SDUPTHER

## 2019-09-13 RX ORDER — SODIUM CHLORIDE 0.9 % (FLUSH) 0.9 %
10 SYRINGE (ML) INJECTION EVERY 12 HOURS SCHEDULED
Status: DISCONTINUED | OUTPATIENT
Start: 2019-09-13 | End: 2019-09-13 | Stop reason: HOSPADM

## 2019-09-13 RX ORDER — SUCCINYLCHOLINE/SOD CL,ISO/PF 200MG/10ML
SYRINGE (ML) INTRAVENOUS PRN
Status: DISCONTINUED | OUTPATIENT
Start: 2019-09-13 | End: 2019-09-13 | Stop reason: SDUPTHER

## 2019-09-13 RX ORDER — MIDAZOLAM HYDROCHLORIDE 1 MG/ML
INJECTION INTRAMUSCULAR; INTRAVENOUS PRN
Status: DISCONTINUED | OUTPATIENT
Start: 2019-09-13 | End: 2019-09-13 | Stop reason: SDUPTHER

## 2019-09-13 RX ORDER — SODIUM CHLORIDE 9 MG/ML
INJECTION, SOLUTION INTRAVENOUS CONTINUOUS
Status: DISCONTINUED | OUTPATIENT
Start: 2019-09-13 | End: 2019-09-13 | Stop reason: HOSPADM

## 2019-09-13 RX ORDER — OXYCODONE HYDROCHLORIDE AND ACETAMINOPHEN 5; 325 MG/1; MG/1
2 TABLET ORAL PRN
Status: DISCONTINUED | OUTPATIENT
Start: 2019-09-13 | End: 2019-09-13 | Stop reason: HOSPADM

## 2019-09-13 RX ORDER — MAGNESIUM HYDROXIDE 1200 MG/15ML
LIQUID ORAL CONTINUOUS PRN
Status: COMPLETED | OUTPATIENT
Start: 2019-09-13 | End: 2019-09-13

## 2019-09-13 RX ORDER — FENTANYL CITRATE 50 UG/ML
INJECTION, SOLUTION INTRAMUSCULAR; INTRAVENOUS PRN
Status: DISCONTINUED | OUTPATIENT
Start: 2019-09-13 | End: 2019-09-13 | Stop reason: SDUPTHER

## 2019-09-13 RX ORDER — LABETALOL HYDROCHLORIDE 5 MG/ML
INJECTION, SOLUTION INTRAVENOUS PRN
Status: DISCONTINUED | OUTPATIENT
Start: 2019-09-13 | End: 2019-09-13 | Stop reason: SDUPTHER

## 2019-09-13 RX ORDER — ONDANSETRON 2 MG/ML
4 INJECTION INTRAMUSCULAR; INTRAVENOUS
Status: DISCONTINUED | OUTPATIENT
Start: 2019-09-13 | End: 2019-09-13 | Stop reason: HOSPADM

## 2019-09-13 RX ORDER — ROPIVACAINE HYDROCHLORIDE 5 MG/ML
INJECTION, SOLUTION EPIDURAL; INFILTRATION; PERINEURAL
Status: COMPLETED | OUTPATIENT
Start: 2019-09-13 | End: 2019-09-13

## 2019-09-13 RX ORDER — LIDOCAINE HYDROCHLORIDE 20 MG/ML
INJECTION, SOLUTION EPIDURAL; INFILTRATION; INTRACAUDAL; PERINEURAL PRN
Status: DISCONTINUED | OUTPATIENT
Start: 2019-09-13 | End: 2019-09-13 | Stop reason: SDUPTHER

## 2019-09-13 RX ORDER — 0.9 % SODIUM CHLORIDE 0.9 %
VIAL (ML) INJECTION PRN
Status: DISCONTINUED | OUTPATIENT
Start: 2019-09-13 | End: 2019-09-13 | Stop reason: SDUPTHER

## 2019-09-13 RX ORDER — ONDANSETRON 2 MG/ML
INJECTION INTRAMUSCULAR; INTRAVENOUS PRN
Status: DISCONTINUED | OUTPATIENT
Start: 2019-09-13 | End: 2019-09-13 | Stop reason: SDUPTHER

## 2019-09-13 RX ORDER — FENTANYL CITRATE 50 UG/ML
25 INJECTION, SOLUTION INTRAMUSCULAR; INTRAVENOUS EVERY 5 MIN PRN
Status: DISCONTINUED | OUTPATIENT
Start: 2019-09-13 | End: 2019-09-13 | Stop reason: HOSPADM

## 2019-09-13 RX ORDER — PROMETHAZINE HYDROCHLORIDE 25 MG/1
25 TABLET ORAL EVERY 6 HOURS PRN
Qty: 5 TABLET | Refills: 0 | Status: SHIPPED | OUTPATIENT
Start: 2019-09-13 | End: 2019-11-05

## 2019-09-13 RX ORDER — GLYCOPYRROLATE 0.2 MG/ML
INJECTION INTRAMUSCULAR; INTRAVENOUS PRN
Status: DISCONTINUED | OUTPATIENT
Start: 2019-09-13 | End: 2019-09-13 | Stop reason: SDUPTHER

## 2019-09-13 RX ORDER — VECURONIUM BROMIDE 1 MG/ML
INJECTION, POWDER, LYOPHILIZED, FOR SOLUTION INTRAVENOUS PRN
Status: DISCONTINUED | OUTPATIENT
Start: 2019-09-13 | End: 2019-09-13 | Stop reason: SDUPTHER

## 2019-09-13 RX ADMIN — VECURONIUM BROMIDE 2 MG: 1 INJECTION, POWDER, LYOPHILIZED, FOR SOLUTION INTRAVENOUS at 08:31

## 2019-09-13 RX ADMIN — FENTANYL CITRATE 25 MCG: 50 INJECTION INTRAMUSCULAR; INTRAVENOUS at 10:07

## 2019-09-13 RX ADMIN — FENTANYL CITRATE 50 MCG: 50 INJECTION INTRAMUSCULAR; INTRAVENOUS at 07:16

## 2019-09-13 RX ADMIN — VECURONIUM BROMIDE 8 MG: 1 INJECTION, POWDER, LYOPHILIZED, FOR SOLUTION INTRAVENOUS at 07:24

## 2019-09-13 RX ADMIN — SODIUM CHLORIDE: 9 INJECTION, SOLUTION INTRAVENOUS at 06:57

## 2019-09-13 RX ADMIN — HYDROMORPHONE HYDROCHLORIDE 0.5 MG: 1 INJECTION, SOLUTION INTRAMUSCULAR; INTRAVENOUS; SUBCUTANEOUS at 09:10

## 2019-09-13 RX ADMIN — SODIUM CHLORIDE 8 ML: 9 INJECTION INTRAMUSCULAR; INTRAVENOUS; SUBCUTANEOUS at 07:24

## 2019-09-13 RX ADMIN — PROPOFOL 200 MG: 10 INJECTION, EMULSION INTRAVENOUS at 07:21

## 2019-09-13 RX ADMIN — ONDANSETRON 4 MG: 2 INJECTION INTRAMUSCULAR; INTRAVENOUS at 07:25

## 2019-09-13 RX ADMIN — FENTANYL CITRATE 25 MCG: 50 INJECTION INTRAMUSCULAR; INTRAVENOUS at 10:25

## 2019-09-13 RX ADMIN — HYDROMORPHONE HYDROCHLORIDE 1 MG: 1 INJECTION, SOLUTION INTRAMUSCULAR; INTRAVENOUS; SUBCUTANEOUS at 08:23

## 2019-09-13 RX ADMIN — LABETALOL HYDROCHLORIDE 10 MG: 5 INJECTION, SOLUTION INTRAVENOUS at 09:02

## 2019-09-13 RX ADMIN — LIDOCAINE HYDROCHLORIDE 100 MG: 20 INJECTION, SOLUTION EPIDURAL; INFILTRATION; INTRACAUDAL; PERINEURAL at 07:21

## 2019-09-13 RX ADMIN — Medication 100 MG: at 07:21

## 2019-09-13 RX ADMIN — ESMOLOL HYDROCHLORIDE 30 MG: 100 INJECTION, SOLUTION INTRAVENOUS at 08:55

## 2019-09-13 RX ADMIN — HYDROMORPHONE HYDROCHLORIDE 0.5 MG: 1 INJECTION, SOLUTION INTRAMUSCULAR; INTRAVENOUS; SUBCUTANEOUS at 08:51

## 2019-09-13 RX ADMIN — HYDROMORPHONE HYDROCHLORIDE 0.5 MG: 1 INJECTION, SOLUTION INTRAMUSCULAR; INTRAVENOUS; SUBCUTANEOUS at 11:01

## 2019-09-13 RX ADMIN — SUGAMMADEX 150 MG: 100 INJECTION, SOLUTION INTRAVENOUS at 09:16

## 2019-09-13 RX ADMIN — ESMOLOL HYDROCHLORIDE 30 MG: 100 INJECTION, SOLUTION INTRAVENOUS at 08:50

## 2019-09-13 RX ADMIN — ROPIVACAINE HYDROCHLORIDE 30 ML: 5 INJECTION, SOLUTION EPIDURAL; INFILTRATION; PERINEURAL at 07:14

## 2019-09-13 RX ADMIN — GLYCOPYRROLATE 0.2 MG: 0.2 INJECTION, SOLUTION INTRAMUSCULAR; INTRAVENOUS at 07:25

## 2019-09-13 RX ADMIN — FENTANYL CITRATE 25 MCG: 50 INJECTION INTRAMUSCULAR; INTRAVENOUS at 09:58

## 2019-09-13 RX ADMIN — FENTANYL CITRATE 50 MCG: 50 INJECTION INTRAMUSCULAR; INTRAVENOUS at 07:21

## 2019-09-13 RX ADMIN — DEXAMETHASONE SODIUM PHOSPHATE 8 MG: 4 INJECTION, SOLUTION INTRAMUSCULAR; INTRAVENOUS at 07:25

## 2019-09-13 RX ADMIN — MIDAZOLAM 2 MG: 1 INJECTION INTRAMUSCULAR; INTRAVENOUS at 07:16

## 2019-09-13 RX ADMIN — SODIUM CHLORIDE: 9 INJECTION, SOLUTION INTRAVENOUS at 09:16

## 2019-09-13 RX ADMIN — HYDROMORPHONE HYDROCHLORIDE 0.5 MG: 1 INJECTION, SOLUTION INTRAMUSCULAR; INTRAVENOUS; SUBCUTANEOUS at 10:42

## 2019-09-13 RX ADMIN — FENTANYL CITRATE 25 MCG: 50 INJECTION INTRAMUSCULAR; INTRAVENOUS at 10:15

## 2019-09-13 RX ADMIN — SODIUM CHLORIDE 2 ML: 9 INJECTION INTRAMUSCULAR; INTRAVENOUS; SUBCUTANEOUS at 08:31

## 2019-09-13 ASSESSMENT — PAIN DESCRIPTION - ORIENTATION
ORIENTATION: LEFT

## 2019-09-13 ASSESSMENT — PAIN - FUNCTIONAL ASSESSMENT
PAIN_FUNCTIONAL_ASSESSMENT: PREVENTS OR INTERFERES SOME ACTIVE ACTIVITIES AND ADLS
PAIN_FUNCTIONAL_ASSESSMENT: 0-10
PAIN_FUNCTIONAL_ASSESSMENT: PREVENTS OR INTERFERES SOME ACTIVE ACTIVITIES AND ADLS
PAIN_FUNCTIONAL_ASSESSMENT: PREVENTS OR INTERFERES SOME ACTIVE ACTIVITIES AND ADLS

## 2019-09-13 ASSESSMENT — PULMONARY FUNCTION TESTS
PIF_VALUE: 16
PIF_VALUE: 15
PIF_VALUE: 14
PIF_VALUE: 15
PIF_VALUE: 14
PIF_VALUE: 14
PIF_VALUE: 15
PIF_VALUE: 14
PIF_VALUE: 4
PIF_VALUE: 14
PIF_VALUE: 26
PIF_VALUE: 15
PIF_VALUE: 14
PIF_VALUE: 13
PIF_VALUE: 2
PIF_VALUE: 15
PIF_VALUE: 10
PIF_VALUE: 5
PIF_VALUE: 13
PIF_VALUE: 15
PIF_VALUE: 14
PIF_VALUE: 12
PIF_VALUE: 23
PIF_VALUE: 14
PIF_VALUE: 15
PIF_VALUE: 13
PIF_VALUE: 15
PIF_VALUE: 14
PIF_VALUE: 14
PIF_VALUE: 0
PIF_VALUE: 14
PIF_VALUE: 3
PIF_VALUE: 15
PIF_VALUE: 3
PIF_VALUE: 15
PIF_VALUE: 4
PIF_VALUE: 12
PIF_VALUE: 15
PIF_VALUE: 5
PIF_VALUE: 15
PIF_VALUE: 1
PIF_VALUE: 13
PIF_VALUE: 5
PIF_VALUE: 13
PIF_VALUE: 14
PIF_VALUE: 14
PIF_VALUE: 15
PIF_VALUE: 14
PIF_VALUE: 13
PIF_VALUE: 15
PIF_VALUE: 14
PIF_VALUE: 15
PIF_VALUE: 14
PIF_VALUE: 14
PIF_VALUE: 15
PIF_VALUE: 14
PIF_VALUE: 15
PIF_VALUE: 1
PIF_VALUE: 12
PIF_VALUE: 15
PIF_VALUE: 14
PIF_VALUE: 15
PIF_VALUE: 5
PIF_VALUE: 14
PIF_VALUE: 13
PIF_VALUE: 14
PIF_VALUE: 16
PIF_VALUE: 14
PIF_VALUE: 12
PIF_VALUE: 15
PIF_VALUE: 14
PIF_VALUE: 15
PIF_VALUE: 14
PIF_VALUE: 16
PIF_VALUE: 14
PIF_VALUE: 15
PIF_VALUE: 16
PIF_VALUE: 14
PIF_VALUE: 14
PIF_VALUE: 13
PIF_VALUE: 16
PIF_VALUE: 15
PIF_VALUE: 14
PIF_VALUE: 15
PIF_VALUE: 14
PIF_VALUE: 15
PIF_VALUE: 14
PIF_VALUE: 12
PIF_VALUE: 14
PIF_VALUE: 12
PIF_VALUE: 1
PIF_VALUE: 15
PIF_VALUE: 14
PIF_VALUE: 13
PIF_VALUE: 1
PIF_VALUE: 15
PIF_VALUE: 5

## 2019-09-13 ASSESSMENT — PAIN DESCRIPTION - PROGRESSION
CLINICAL_PROGRESSION: GRADUALLY WORSENING
CLINICAL_PROGRESSION: NOT CHANGED
CLINICAL_PROGRESSION: NOT CHANGED
CLINICAL_PROGRESSION: GRADUALLY IMPROVING
CLINICAL_PROGRESSION: GRADUALLY WORSENING
CLINICAL_PROGRESSION: GRADUALLY IMPROVING

## 2019-09-13 ASSESSMENT — PAIN DESCRIPTION - PAIN TYPE
TYPE: SURGICAL PAIN

## 2019-09-13 ASSESSMENT — PAIN DESCRIPTION - DESCRIPTORS
DESCRIPTORS: ACHING;DISCOMFORT
DESCRIPTORS: PRESSURE
DESCRIPTORS: ACHING
DESCRIPTORS: ACHING;DISCOMFORT
DESCRIPTORS: ACHING;DISCOMFORT
DESCRIPTORS: PRESSURE
DESCRIPTORS: ACHING;DISCOMFORT
DESCRIPTORS: ACHING;PRESSURE
DESCRIPTORS: ACHING;DISCOMFORT
DESCRIPTORS: ACHING

## 2019-09-13 ASSESSMENT — PAIN DESCRIPTION - LOCATION
LOCATION: KNEE

## 2019-09-13 ASSESSMENT — PAIN DESCRIPTION - ONSET
ONSET: ON-GOING
ONSET: AWAKENED FROM SLEEP
ONSET: ON-GOING
ONSET: ON-GOING

## 2019-09-13 ASSESSMENT — PAIN DESCRIPTION - FREQUENCY
FREQUENCY: CONTINUOUS
FREQUENCY: INTERMITTENT
FREQUENCY: CONTINUOUS

## 2019-09-13 ASSESSMENT — LIFESTYLE VARIABLES: SMOKING_STATUS: 0

## 2019-09-13 ASSESSMENT — PAIN SCALES - GENERAL
PAINLEVEL_OUTOF10: 5
PAINLEVEL_OUTOF10: 6
PAINLEVEL_OUTOF10: 6
PAINLEVEL_OUTOF10: 5
PAINLEVEL_OUTOF10: 7

## 2019-09-13 ASSESSMENT — ENCOUNTER SYMPTOMS: SHORTNESS OF BREATH: 0

## 2019-09-14 NOTE — OP NOTE
A  Nitinol wire was inserted. We then inserted Arthrex 9 x 20 mm  BioComposite Delta interference screw. We then drilled for SwiveLock  distally about 2 to 3 cm distal to our tibial tunnel. We then loaded  our FiberTape through the [address] [place] [address] Street, which was a PEEK SwiveLock and then  placed this into the tibia with tension on our FiberTape and then fully  inserted the SwiveLock to complete our internal brace. The excess FiberTape was then cut. The excess graft was then cut and we  copiously irrigated the wound with normal saline solution. The tibial  incision was closed with 2-0 Vicryl suture subcutaneously in a simple  inverted interrupted fashion. The skin was closed with 4-0 Monocryl in  a running subcuticular fashion and the portal sites were closed with 2-0  Prolene in a simple interrupted fashion. Steri-Strips were then  applied. Betadine ointment, 2x2 gauze, Tegaderm dressing were then  applied as well as sterile Webril, and an Ace wrap and then hinged knee  brace locked at 20 degrees of flexion. The tourniquet was then deflated  at 86 minutes. The patient was awoken from anesthesia, transferred onto her hospital  cart, and transported to the PACU for recovery. She tolerated the  procedure well without any complications. PLAN:  The patient will be recovered in the PACU and then be discharged  home. She is touch-down weightbearing for the next two weeks. She will  be in the brace for about four to six weeks until she regains normal  gait with adequate quad strength. She was given a prescription for  Percocet as well as Phenergan for nausea and vomiting. She was  instructed to ice her knee continuously throughout the weekend with  careful instructions to make sure the ice does not directly contact her  skin to avoid the potential for frostbite.   She will follow up in the  office in three to four days for a wound check as well as review of her  arthroscopic pictures with her and start her on physical therapy.         Fozia Durán MD    D: 09/13/2019 9:34:00       T: 09/13/2019 11:17:46     [email or web address]  Job#: [id number]     Doc#: [id number]    CC:

## 2019-09-16 ENCOUNTER — OFFICE VISIT (OUTPATIENT)
Dept: ORTHOPEDIC SURGERY | Age: 44
End: 2019-09-16

## 2019-09-16 VITALS — RESPIRATION RATE: 16 BRPM | WEIGHT: 121.25 LBS | HEIGHT: 64 IN | BODY MASS INDEX: 20.7 KG/M2

## 2019-09-16 DIAGNOSIS — S83.242A OTHER TEAR OF MEDIAL MENISCUS OF LEFT KNEE AS CURRENT INJURY, INITIAL ENCOUNTER: ICD-10-CM

## 2019-09-16 DIAGNOSIS — S83.512A RUPTURE OF ANTERIOR CRUCIATE LIGAMENT OF LEFT KNEE, INITIAL ENCOUNTER: Primary | ICD-10-CM

## 2019-09-16 LAB
3-OH-COTININE: 50 NG/ML
COTININE: 60 NG/ML
NICOTINE: <2 NG/ML

## 2019-09-16 PROCEDURE — 99024 POSTOP FOLLOW-UP VISIT: CPT | Performed by: ORTHOPAEDIC SURGERY

## 2019-09-17 ENCOUNTER — TELEPHONE (OUTPATIENT)
Dept: ORTHOPEDIC SURGERY | Age: 44
End: 2019-09-17

## 2019-09-17 NOTE — TELEPHONE ENCOUNTER
She cannot have a refill. She was given 40 tablets to last for 1 week only 4 days ago on 9/13/19 (the day of surgery). Has she taken all 40 tablets? If so, that is in complete disregard of the prescription, and per [facility] [place] opioid prescribing laws, I should not continue to prescribe her narcotics then.

## 2019-09-19 ENCOUNTER — HOSPITAL ENCOUNTER (OUTPATIENT)
Dept: PHYSICAL THERAPY | Age: 44
Setting detail: THERAPIES SERIES
Discharge: HOME | End: 2019-09-19
Payer: COMMERCIAL

## 2019-09-19 PROCEDURE — G0283 ELEC STIM OTHER THAN WOUND: HCPCS

## 2019-09-19 PROCEDURE — 97161 PT EVAL LOW COMPLEX 20 MIN: CPT

## 2019-09-19 PROCEDURE — 97110 THERAPEUTIC EXERCISES: CPT

## 2019-09-19 NOTE — PROGRESS NOTES
use)  Bathroom Toilet: Standard  Home Equipment: Crutches  Active : Yes(Pt's father drove her to PT evaluation today. Pt stated she was instructed by Dr. Radha Gómez that she can drive )  Occupation: Unemployed  Type of occupation: Pt was working at [employer] as a   Leisure & Hobbies: Yard work  IADL Comments: \"I can't do anything right now\"    Objective  Observation: Gait: with 2 crutches, NWB, with brace donned. Stand to plinth transfer: pt required assistance of UEs to pull leg onto treatment table. Pt removed her brace and ace bandage independently and after collecting objective data donned ace bandage independently appropriately. Edema: Superior patellar pole L 35.0, R 31.8; inferior patellar pole L 32.8, R 30.3. AROM L knee flexion -3-38; PROM -2-50. Strength and ROMRLE: WFL  Strength LLE  L Ankle Dorsiflexion: 4+/5  L Ankle Plantar Flexion: 4+/5  L Ankle Inversion: 4/5  L Ankle Eversion: 4/5  L Great Toe Extension: 4/5  Tone: glutes B WFL, quads L poor, R good; VMO L poor, R good. Other: HEP: ankle pumps, quad set    Assessment   Conditions Requiring Skilled Therapeutic Intervention  Body structures, Functions, Activity limitations: Decreased functional mobility ; Decreased ADL status; Decreased ROM; Decreased strength; Increased Pain  Assessment: Pt presented to PT 6 days s/p L ACL reconstruction. PLOF: independent and active.   Treatment Diagnosis: Decreased functional mobility  Prognosis: Good  Decision Making: Low Complexity  REQUIRES PT FOLLOW UP: Yes  Activity Tolerance  Activity Tolerance: Patient limited by pain         Plan   Plan  Times per week: PT 1-2x/week for 4-6 weeks  Current Treatment Recommendations: Strengthening, ROM, Home Exercise Program, Manual Therapy - Soft Tissue Mobilization, Manual Therapy - Joint Manipulation, Modalities    OutComes Score  LEFS Total Score: 19 (09/19/19 1001)    Goals  Short term goals  Time Frame for Short term goals: 1 week  Short term goal 1: Pt will

## 2019-09-20 ENCOUNTER — TELEPHONE (OUTPATIENT)
Dept: ORTHOPEDIC SURGERY | Age: 44
End: 2019-09-20

## 2019-09-23 ENCOUNTER — HOSPITAL ENCOUNTER (OUTPATIENT)
Dept: PHYSICAL THERAPY | Age: 44
Setting detail: THERAPIES SERIES
Discharge: HOME | End: 2019-09-23
Payer: COMMERCIAL

## 2019-09-23 ENCOUNTER — TELEPHONE (OUTPATIENT)
Dept: ORTHOPEDIC SURGERY | Age: 44
End: 2019-09-23

## 2019-09-23 DIAGNOSIS — S83.512A RUPTURE OF ANTERIOR CRUCIATE LIGAMENT OF LEFT KNEE, INITIAL ENCOUNTER: Primary | ICD-10-CM

## 2019-09-23 PROCEDURE — 97110 THERAPEUTIC EXERCISES: CPT

## 2019-09-23 PROCEDURE — G0283 ELEC STIM OTHER THAN WOUND: HCPCS

## 2019-09-23 RX ORDER — OXYCODONE HYDROCHLORIDE AND ACETAMINOPHEN 5; 325 MG/1; MG/1
1 TABLET ORAL EVERY 8 HOURS PRN
Qty: 21 TABLET | Refills: 0 | Status: SHIPPED | OUTPATIENT
Start: 2019-09-23 | End: 2019-09-30

## 2019-09-23 NOTE — FLOWSHEET NOTE
Physical Therapy Daily Treatment Note  Date:  2019    Patient Name:  Reno Bowers    :  1975  MRN: [id number]  Restrictions/Precautions:    Pertinent Medical History:  Medical/Treatment Diagnosis Information:  · Diagnosis: Rupture of anterior cruciate ligament of left knee, initial encounter (S83.512A)  · Treatment Diagnosis: Decreased functional mobility  Insurance/Certification information:  PT Insurance Information: [facility]  Physician Information:  Referring Practitioner: Dr. Kellie Snyder of care signed (Y/N):  Sent to in basket on 19  Visit# / total visits:    Pain level: 10/10 L knee      Progress Note: []  Yes  []  No  Next due by: Visit #10      History of Injury: Pt had L knee surgery on 19. Pt stated she is NWB \"for at least the next 2 weeks until I see him (Dr. Dominique Deng). I can touch my foot to the ground but I can't put any weight through it. \"  Pt stated his surgery went \"fine\" but her meniscus is not smooth. Pt rated L knee pain 10/10 and \"I ran out of pain meds 2 days ago. \"  Pt stated she might call back Friday to see if she can get another prescription. Pt has been using ice to try to help contain the pain. Pt has been using 2 crutches for gait. Pt brought Dr. Harden Ripa protocol which indicated she is touch down WB for 2 weeks and the next 2 weeks WBAT. The brace setting is unlocked per protocol. Subjective:   Return to Dr. Dominique Deng on 19 POD #10 did okay following first PT session. Pt stated her knee is very painful and she is trying to contact her doctor regarding pain. Pt stated she has a lot of swelling. Pt stated she has not been putting weight through the L LE. Pt stated she fell 3 days ago \"I guess my crutches just went flying and I fell. \"  Pt stated she landed into a long sitting position but the left leg was protected during the fall. Pt stated \"I don't think I did anything\" regarding harming the L LE.   Pt stated she did not

## 2019-09-25 ENCOUNTER — HOSPITAL ENCOUNTER (OUTPATIENT)
Dept: PHYSICAL THERAPY | Age: 44
Setting detail: THERAPIES SERIES
Discharge: HOME | End: 2019-09-25
Payer: COMMERCIAL

## 2019-09-25 PROCEDURE — G0283 ELEC STIM OTHER THAN WOUND: HCPCS

## 2019-09-25 PROCEDURE — 97110 THERAPEUTIC EXERCISES: CPT

## 2019-09-27 ENCOUNTER — APPOINTMENT (OUTPATIENT)
Dept: PHYSICAL THERAPY | Age: 44
End: 2019-09-27
Payer: COMMERCIAL

## 2019-09-30 ENCOUNTER — HOSPITAL ENCOUNTER (OUTPATIENT)
Dept: PHYSICAL THERAPY | Age: 44
Setting detail: THERAPIES SERIES
Discharge: HOME | End: 2019-09-30
Payer: COMMERCIAL

## 2019-09-30 ENCOUNTER — OFFICE VISIT (OUTPATIENT)
Dept: ORTHOPEDIC SURGERY | Age: 44
End: 2019-09-30

## 2019-09-30 VITALS — HEIGHT: 63 IN | BODY MASS INDEX: 21.44 KG/M2 | RESPIRATION RATE: 16 BRPM | WEIGHT: 121 LBS

## 2019-09-30 DIAGNOSIS — S83.512A RUPTURE OF ANTERIOR CRUCIATE LIGAMENT OF LEFT KNEE, INITIAL ENCOUNTER: Primary | ICD-10-CM

## 2019-09-30 PROCEDURE — 99024 POSTOP FOLLOW-UP VISIT: CPT | Performed by: ORTHOPAEDIC SURGERY

## 2019-09-30 PROCEDURE — G0283 ELEC STIM OTHER THAN WOUND: HCPCS

## 2019-09-30 PROCEDURE — 97110 THERAPEUTIC EXERCISES: CPT

## 2019-10-02 ENCOUNTER — HOSPITAL ENCOUNTER (OUTPATIENT)
Dept: PHYSICAL THERAPY | Age: 44
Setting detail: THERAPIES SERIES
Discharge: HOME | End: 2019-10-02
Payer: COMMERCIAL

## 2019-10-02 PROCEDURE — 97110 THERAPEUTIC EXERCISES: CPT

## 2019-10-02 PROCEDURE — G0283 ELEC STIM OTHER THAN WOUND: HCPCS

## 2019-10-03 ENCOUNTER — APPOINTMENT (OUTPATIENT)
Dept: PHYSICAL THERAPY | Age: 44
End: 2019-10-03
Payer: COMMERCIAL

## 2019-10-03 ENCOUNTER — TELEPHONE (OUTPATIENT)
Dept: ORTHOPEDIC SURGERY | Age: 44
End: 2019-10-03

## 2019-10-07 ENCOUNTER — HOSPITAL ENCOUNTER (OUTPATIENT)
Dept: PHYSICAL THERAPY | Age: 44
Setting detail: THERAPIES SERIES
Discharge: HOME | End: 2019-10-07
Payer: COMMERCIAL

## 2019-10-07 PROCEDURE — G0283 ELEC STIM OTHER THAN WOUND: HCPCS

## 2019-10-07 PROCEDURE — 97110 THERAPEUTIC EXERCISES: CPT

## 2019-10-09 ENCOUNTER — HOSPITAL ENCOUNTER (OUTPATIENT)
Dept: PHYSICAL THERAPY | Age: 44
Setting detail: THERAPIES SERIES
Discharge: HOME | End: 2019-10-09
Payer: COMMERCIAL

## 2019-10-09 PROCEDURE — 97110 THERAPEUTIC EXERCISES: CPT

## 2019-10-09 PROCEDURE — G0283 ELEC STIM OTHER THAN WOUND: HCPCS

## 2019-10-11 ENCOUNTER — APPOINTMENT (OUTPATIENT)
Dept: PHYSICAL THERAPY | Age: 44
End: 2019-10-11
Payer: COMMERCIAL

## 2019-10-16 ENCOUNTER — HOSPITAL ENCOUNTER (OUTPATIENT)
Dept: PHYSICAL THERAPY | Age: 44
Setting detail: THERAPIES SERIES
Discharge: HOME | End: 2019-10-16
Payer: COMMERCIAL

## 2019-10-16 PROCEDURE — G0283 ELEC STIM OTHER THAN WOUND: HCPCS

## 2019-10-16 PROCEDURE — 97110 THERAPEUTIC EXERCISES: CPT

## 2019-10-17 ENCOUNTER — APPOINTMENT (OUTPATIENT)
Dept: PHYSICAL THERAPY | Age: 44
End: 2019-10-17
Payer: COMMERCIAL

## 2019-10-21 ENCOUNTER — HOSPITAL ENCOUNTER (OUTPATIENT)
Dept: PHYSICAL THERAPY | Age: 44
Setting detail: THERAPIES SERIES
Discharge: HOME | End: 2019-10-21
Payer: COMMERCIAL

## 2019-10-23 ENCOUNTER — HOSPITAL ENCOUNTER (OUTPATIENT)
Dept: PHYSICAL THERAPY | Age: 44
Setting detail: THERAPIES SERIES
Discharge: HOME | End: 2019-10-23
Payer: COMMERCIAL

## 2019-10-23 PROCEDURE — G0283 ELEC STIM OTHER THAN WOUND: HCPCS

## 2019-10-23 PROCEDURE — 97110 THERAPEUTIC EXERCISES: CPT

## 2019-10-23 PROCEDURE — 97140 MANUAL THERAPY 1/> REGIONS: CPT

## 2019-10-25 ENCOUNTER — APPOINTMENT (OUTPATIENT)
Dept: PHYSICAL THERAPY | Age: 44
End: 2019-10-25
Payer: COMMERCIAL

## 2019-10-28 ENCOUNTER — HOSPITAL ENCOUNTER (OUTPATIENT)
Dept: PHYSICAL THERAPY | Age: 44
Setting detail: THERAPIES SERIES
Discharge: HOME | End: 2019-10-28
Payer: COMMERCIAL

## 2019-10-29 ENCOUNTER — HOSPITAL ENCOUNTER (OUTPATIENT)
Dept: PHYSICAL THERAPY | Age: 44
Setting detail: THERAPIES SERIES
Discharge: HOME | End: 2019-10-29
Payer: COMMERCIAL

## 2019-10-29 PROCEDURE — G0283 ELEC STIM OTHER THAN WOUND: HCPCS

## 2019-10-29 PROCEDURE — 97110 THERAPEUTIC EXERCISES: CPT

## 2019-10-29 PROCEDURE — 97140 MANUAL THERAPY 1/> REGIONS: CPT

## 2019-10-30 ENCOUNTER — HOSPITAL ENCOUNTER (OUTPATIENT)
Dept: PHYSICAL THERAPY | Age: 44
Setting detail: THERAPIES SERIES
Discharge: HOME | End: 2019-10-30
Payer: COMMERCIAL

## 2019-10-30 PROCEDURE — 97110 THERAPEUTIC EXERCISES: CPT

## 2019-10-30 PROCEDURE — G0283 ELEC STIM OTHER THAN WOUND: HCPCS

## 2019-10-30 PROCEDURE — 97140 MANUAL THERAPY 1/> REGIONS: CPT

## 2019-11-05 ENCOUNTER — OFFICE VISIT (OUTPATIENT)
Dept: ORTHOPEDIC SURGERY | Age: 44
End: 2019-11-05

## 2019-11-05 VITALS — RESPIRATION RATE: 17 BRPM | HEART RATE: 72 BPM | BODY MASS INDEX: 21.45 KG/M2 | WEIGHT: 121.03 LBS | HEIGHT: 63 IN

## 2019-11-05 DIAGNOSIS — S83.512A RUPTURE OF ANTERIOR CRUCIATE LIGAMENT OF LEFT KNEE, INITIAL ENCOUNTER: Primary | ICD-10-CM

## 2019-11-05 DIAGNOSIS — S83.242A OTHER TEAR OF MEDIAL MENISCUS OF LEFT KNEE AS CURRENT INJURY, INITIAL ENCOUNTER: ICD-10-CM

## 2019-11-05 DIAGNOSIS — Z72.0 TOBACCO USE: ICD-10-CM

## 2019-11-05 PROCEDURE — 99024 POSTOP FOLLOW-UP VISIT: CPT | Performed by: ORTHOPAEDIC SURGERY

## 2019-11-06 ENCOUNTER — HOSPITAL ENCOUNTER (OUTPATIENT)
Dept: PHYSICAL THERAPY | Age: 44
Setting detail: THERAPIES SERIES
Discharge: HOME | End: 2019-11-06
Payer: COMMERCIAL

## 2019-11-06 PROCEDURE — G0283 ELEC STIM OTHER THAN WOUND: HCPCS

## 2019-11-06 PROCEDURE — 97140 MANUAL THERAPY 1/> REGIONS: CPT

## 2019-11-06 PROCEDURE — 97110 THERAPEUTIC EXERCISES: CPT

## 2019-11-15 ENCOUNTER — HOSPITAL ENCOUNTER (OUTPATIENT)
Dept: PHYSICAL THERAPY | Age: 44
Setting detail: THERAPIES SERIES
Discharge: HOME | End: 2019-11-15
Payer: COMMERCIAL

## 2019-11-18 ENCOUNTER — HOSPITAL ENCOUNTER (OUTPATIENT)
Dept: PHYSICAL THERAPY | Age: 44
Setting detail: THERAPIES SERIES
Discharge: HOME | End: 2019-11-18
Payer: COMMERCIAL

## 2019-11-18 PROCEDURE — 97530 THERAPEUTIC ACTIVITIES: CPT

## 2019-11-18 PROCEDURE — G0283 ELEC STIM OTHER THAN WOUND: HCPCS

## 2019-11-18 PROCEDURE — 97110 THERAPEUTIC EXERCISES: CPT

## 2019-11-27 ENCOUNTER — TELEPHONE (OUTPATIENT)
Dept: ORTHOPEDIC SURGERY | Age: 44
End: 2019-11-27

## 2019-12-11 ENCOUNTER — TELEPHONE (OUTPATIENT)
Dept: ORTHOPEDIC SURGERY | Age: 44
End: 2019-12-11

## 2020-01-13 ENCOUNTER — OFFICE VISIT (OUTPATIENT)
Dept: ORTHOPEDIC SURGERY | Age: 45
End: 2020-01-13
Payer: COMMERCIAL

## 2020-01-13 VITALS
BODY MASS INDEX: 21.45 KG/M2 | RESPIRATION RATE: 19 BRPM | WEIGHT: 121.03 LBS | HEART RATE: 82 BPM | DIASTOLIC BLOOD PRESSURE: 114 MMHG | HEIGHT: 63 IN | SYSTOLIC BLOOD PRESSURE: 176 MMHG

## 2020-01-13 PROCEDURE — 99213 OFFICE O/P EST LOW 20 MIN: CPT | Performed by: ORTHOPAEDIC SURGERY

## 2020-01-13 PROCEDURE — G8484 FLU IMMUNIZE NO ADMIN: HCPCS | Performed by: ORTHOPAEDIC SURGERY

## 2020-01-13 PROCEDURE — G8420 CALC BMI NORM PARAMETERS: HCPCS | Performed by: ORTHOPAEDIC SURGERY

## 2020-01-13 PROCEDURE — G8427 DOCREV CUR MEDS BY ELIG CLIN: HCPCS | Performed by: ORTHOPAEDIC SURGERY

## 2020-01-13 PROCEDURE — 1036F TOBACCO NON-USER: CPT | Performed by: ORTHOPAEDIC SURGERY

## 2020-05-21 ENCOUNTER — TELEPHONE (OUTPATIENT)
Dept: FAMILY MEDICINE CLINIC | Age: 45
End: 2020-05-21

## 2020-05-21 RX ORDER — METHYLPREDNISOLONE 4 MG/1
TABLET ORAL
Qty: 1 KIT | Refills: 0 | Status: SHIPPED | OUTPATIENT
Start: 2020-05-21 | End: 2020-05-27

## 2020-05-26 ENCOUNTER — OFFICE VISIT (OUTPATIENT)
Dept: FAMILY MEDICINE CLINIC | Age: 45
End: 2020-05-26
Payer: COMMERCIAL

## 2020-05-26 VITALS
WEIGHT: 116 LBS | BODY MASS INDEX: 20.55 KG/M2 | OXYGEN SATURATION: 97 % | SYSTOLIC BLOOD PRESSURE: 118 MMHG | DIASTOLIC BLOOD PRESSURE: 76 MMHG | HEART RATE: 104 BPM | TEMPERATURE: 98 F

## 2020-05-26 PROCEDURE — G8926 SPIRO NO PERF OR DOC: HCPCS | Performed by: INTERNAL MEDICINE

## 2020-05-26 PROCEDURE — 3023F SPIROM DOC REV: CPT | Performed by: INTERNAL MEDICINE

## 2020-05-26 PROCEDURE — G8427 DOCREV CUR MEDS BY ELIG CLIN: HCPCS | Performed by: INTERNAL MEDICINE

## 2020-05-26 PROCEDURE — 4004F PT TOBACCO SCREEN RCVD TLK: CPT | Performed by: INTERNAL MEDICINE

## 2020-05-26 PROCEDURE — G8420 CALC BMI NORM PARAMETERS: HCPCS | Performed by: INTERNAL MEDICINE

## 2020-05-26 PROCEDURE — 99214 OFFICE O/P EST MOD 30 MIN: CPT | Performed by: INTERNAL MEDICINE

## 2020-05-26 RX ORDER — GABAPENTIN 300 MG/1
300 CAPSULE ORAL 3 TIMES DAILY
Qty: 90 CAPSULE | Refills: 0 | Status: SHIPPED | OUTPATIENT
Start: 2020-05-26 | End: 2020-06-25

## 2020-05-26 RX ORDER — VALACYCLOVIR HYDROCHLORIDE 1 G/1
1000 TABLET, FILM COATED ORAL 3 TIMES DAILY
Qty: 21 TABLET | Refills: 0 | Status: SHIPPED | OUTPATIENT
Start: 2020-05-26 | End: 2020-06-02

## 2020-05-26 ASSESSMENT — ENCOUNTER SYMPTOMS
COLOR CHANGE: 1
APNEA: 0
COUGH: 1
GASTROINTESTINAL NEGATIVE: 1
CHOKING: 0
SHORTNESS OF BREATH: 1
EYES NEGATIVE: 1
BACK PAIN: 0
STRIDOR: 0
SINUS PRESSURE: 0

## 2020-05-26 NOTE — PROGRESS NOTES
Specified)  Problem: COPD      Relation: Father          Age of Onset: (Not Specified)  Problem: Heart Attack      Relation: Father          Age of Onset: (Not Specified)  Problem: Heart Disease      Relation: Father          Age of Onset: (Not Specified)          Comment: stents/bypass  Problem: Cancer      Relation: Maternal Aunt          Age of Onset: (Not Specified)          Comment: breast/lung  Problem: Arthritis      Relation: Other          Age of Onset: (Not Specified)  Problem: Diabetes      Relation: Other          Age of Onset: (Not Specified)  Problem: High Blood Pressure      Relation: Other          Age of Onset: (Not Specified)  Problem: Stroke      Relation: Sister          Age of Onset: 27  Problem: Lupus      Relation: Sister          Age of Onset: (Not Specified)  Problem: Heart Disease      Relation: Brother          Age of Onset: (Not Specified)  Problem: Heart Attack      Relation: Brother          Age of Onset: (Not Specified)  Problem: COPD      Relation: Maternal Grandmother          Age of Onset: (Not Specified)  Problem: No Known Problems      Relation: Maternal Grandfather          Age of Onset: (Not Specified)  Problem: No Known Problems      Relation: Paternal Grandmother          Age of Onset: (Not Specified)  Problem: No Known Problems      Relation: Paternal Grandfather          Age of Onset: (Not Specified)  Problem: Heart Disease      Relation: Sister          Age of Onset: (Not Specified)  Problem: Breast Cancer      Relation: Maternal Aunt          Age of Onset: (Not Specified)    Social History    Tobacco Use      Smoking status: Current Every Day Smoker        Packs/day: 1.00        Years: 20.00        Pack years: 20        Types: Cigarettes      Smokeless tobacco: Never Used    Alcohol use: No      Rash   This is a new problem. The current episode started 1 to 4 weeks ago. The problem has been gradually worsening since onset. The affected locations include the neck.  The rash

## 2020-06-02 ENCOUNTER — TELEPHONE (OUTPATIENT)
Dept: FAMILY MEDICINE CLINIC | Age: 45
End: 2020-06-02

## 2020-06-02 NOTE — TELEPHONE ENCOUNTER
Pt got a referral from Dr. Julieta Franks for ortho and on the referral it was put for right elbow pain and it is for LEFT ELBOW PAIN.      Pl advise  [phone] (home)

## 2020-06-19 ENCOUNTER — OFFICE VISIT (OUTPATIENT)
Dept: ORTHOPEDIC SURGERY | Age: 45
End: 2020-06-19
Payer: COMMERCIAL

## 2020-06-19 VITALS — HEIGHT: 63 IN | TEMPERATURE: 97.5 F | RESPIRATION RATE: 16 BRPM | WEIGHT: 116 LBS | BODY MASS INDEX: 20.55 KG/M2

## 2020-06-19 PROCEDURE — G8427 DOCREV CUR MEDS BY ELIG CLIN: HCPCS | Performed by: ORTHOPAEDIC SURGERY

## 2020-06-19 PROCEDURE — 99243 OFF/OP CNSLTJ NEW/EST LOW 30: CPT | Performed by: ORTHOPAEDIC SURGERY

## 2020-06-19 PROCEDURE — G8420 CALC BMI NORM PARAMETERS: HCPCS | Performed by: ORTHOPAEDIC SURGERY

## 2020-06-19 NOTE — Clinical Note
Dear  Oralia Carcamo MD,  Thank you very much for your referral or Ms. Rochelle Toney to me for evaluation and treatment of her Hand & Wrist condition. I appreciate your confidence in me and thank you for allowing me the opportunity to care for your patients. If I can be of any further assistance to you on this or any other patient, please do not hesitate to contact me. Sincerely,  Mary Anne Bray.  Charleen Dolan MD

## 2020-06-20 NOTE — PROGRESS NOTES
clinically appropriate bilaterally. Examination for Epicondylar Elbow pain shows severe tenderness to palpation of the left Lateral epicondylar region(s) as she withdraws from examination, being unable to tolerate my examination maneuvers. Pain is markedly exacerbated with digital and wrist extension against resistance. No obvious elbow instability of the elbow joint is encountered. No other sites of point tenderness are elicited        Impression:  Ms. Amanuel Hannah is showing clinical evidence of Epicondylar Elbow Pain and presents requesting further treatment. Plan:    I have had a thorough discussion with Ms. Amanuel Hannah regarding the treatment options available for her initially presenting left Epicondylar elbow pain, which is causing her significant symptoms and difficulty. I have outlined for Ms. Amanuel Hannah the risk, benefits and consequences of the various treatment modalities, including a reasonable expectation for the long term success of each. We have discussed the fact that further, more aggressive treatment may not be the best solution for her current presenting condition. Based upon our current discussion and a reasonable understating of the options available to her, Ms. Amanuel Hannah has selected to proceed with a conservative plan of treatment consisting of: the use of protective adaptation, activity modification, and the judicious use of over-the-counter anti-inflammatory medications if allowed by her primary care physician. We discussed the option of pursuing formalized hand therapy and a prescription was offered and declined. Instructions were given regarding activity modification as well as suggestions for use of the other modalities were discussed. I have clearly explained to her that the above outlined treatment plan should not be expected to 'cure' her Epicondylar Elbow Pain, but we are rather treating the symptoms with which she presents.   She has understood

## 2022-11-22 ENCOUNTER — APPOINTMENT (OUTPATIENT)
Dept: GENERAL RADIOLOGY | Age: 47
End: 2022-11-22
Payer: COMMERCIAL

## 2022-11-22 ENCOUNTER — APPOINTMENT (OUTPATIENT)
Dept: CT IMAGING | Age: 47
End: 2022-11-22
Payer: COMMERCIAL

## 2022-11-22 ENCOUNTER — HOSPITAL ENCOUNTER (EMERGENCY)
Age: 47
Discharge: HOME | End: 2022-11-22
Attending: EMERGENCY MEDICINE
Payer: COMMERCIAL

## 2022-11-22 VITALS
RESPIRATION RATE: 16 BRPM | BODY MASS INDEX: 21.68 KG/M2 | HEART RATE: 82 BPM | WEIGHT: 126.98 LBS | DIASTOLIC BLOOD PRESSURE: 124 MMHG | SYSTOLIC BLOOD PRESSURE: 204 MMHG | HEIGHT: 64 IN | TEMPERATURE: 98.2 F | OXYGEN SATURATION: 99 %

## 2022-11-22 DIAGNOSIS — S00.93XA CONTUSION OF HEAD, UNSPECIFIED PART OF HEAD, INITIAL ENCOUNTER: Primary | ICD-10-CM

## 2022-11-22 DIAGNOSIS — S40.011A CONTUSION OF RIGHT SHOULDER, INITIAL ENCOUNTER: ICD-10-CM

## 2022-11-22 DIAGNOSIS — S70.11XA CONTUSION OF RIGHT THIGH, INITIAL ENCOUNTER: ICD-10-CM

## 2022-11-22 DIAGNOSIS — S16.1XXA STRAIN OF NECK MUSCLE, INITIAL ENCOUNTER: ICD-10-CM

## 2022-11-22 PROCEDURE — 73552 X-RAY EXAM OF FEMUR 2/>: CPT

## 2022-11-22 PROCEDURE — 70450 CT HEAD/BRAIN W/O DYE: CPT

## 2022-11-22 PROCEDURE — 73030 X-RAY EXAM OF SHOULDER: CPT

## 2022-11-22 PROCEDURE — 99284 EMERGENCY DEPT VISIT MOD MDM: CPT

## 2022-11-22 PROCEDURE — 72125 CT NECK SPINE W/O DYE: CPT

## 2022-11-22 PROCEDURE — 6370000000 HC RX 637 (ALT 250 FOR IP): Performed by: EMERGENCY MEDICINE

## 2022-11-22 RX ORDER — IBUPROFEN 800 MG/1
800 TABLET ORAL ONCE
Status: COMPLETED | OUTPATIENT
Start: 2022-11-22 | End: 2022-11-22

## 2022-11-22 RX ORDER — IBUPROFEN 800 MG/1
800 TABLET ORAL
Qty: 90 TABLET | Refills: 0 | Status: SHIPPED | OUTPATIENT
Start: 2022-11-22

## 2022-11-22 RX ADMIN — IBUPROFEN 800 MG: 800 TABLET, FILM COATED ORAL at 18:46

## 2022-11-22 ASSESSMENT — PAIN - FUNCTIONAL ASSESSMENT: PAIN_FUNCTIONAL_ASSESSMENT: 0-10

## 2022-11-22 ASSESSMENT — ENCOUNTER SYMPTOMS
SHORTNESS OF BREATH: 0
SORE THROAT: 0
ABDOMINAL PAIN: 0
COLOR CHANGE: 0
BACK PAIN: 0
ABDOMINAL DISTENTION: 0
COUGH: 0

## 2022-11-22 ASSESSMENT — PAIN DESCRIPTION - ORIENTATION
ORIENTATION: RIGHT
ORIENTATION: RIGHT

## 2022-11-22 ASSESSMENT — PAIN SCALES - GENERAL
PAINLEVEL_OUTOF10: 6
PAINLEVEL_OUTOF10: 6
PAINLEVEL_OUTOF10: 5

## 2022-11-22 ASSESSMENT — PAIN DESCRIPTION - DESCRIPTORS
DESCRIPTORS: ACHING
DESCRIPTORS: ACHING

## 2022-11-22 ASSESSMENT — PAIN DESCRIPTION - LOCATION
LOCATION: SHOULDER;LEG;HEAD
LOCATION: SHOULDER

## 2022-11-22 NOTE — ED TRIAGE NOTES
Pt. Was standing on a foot stool and fell off at 1700, right shoulder injury, right thigh injury and head injury, no LOSS OF CONSCIOUSNESS. Limited ROM of right shoulder due to pain, strong radial pulse present. Lump noted right side of forehead.

## 2022-11-22 NOTE — ED PROVIDER NOTES
[address] ENCOUNTER      Pt Name: Maxine Ward  MRN: [id number]  [place] 1975  Date of evaluation: 11/22/2022  Provider: Lavonne Segal MD    [address] [place] [address]       Chief Complaint   Patient presents with    Fall     Pt. Was standing on a foot stool and fell off at 1700, right shoulder injury, right thigh injury and head injury, no LOSS OF CONSCIOUSNESS. Limited ROM of right shoulder due to pain, strong radial pulse present. Lump noted right side of forehead. HISTORY OF PRESENT ILLNESS   (Location/Symptom, Timing/Onset, Context/Setting, Quality, Duration, Modifying Factors, Severity)  Note limiting factors. Maxine Ward is a 49 y.o. female who presents to the emergency department Complaining of injuries after a fall. Patient states she fell off of a stool and hit her head right shoulder and right thigh. Patient states that she had no loss of consciousness. She states she has some neck pain and has a history of a previous fracture in her neck. Patient denies any numbness or weakness. Patient denies any fevers or chills. Patient denies any back pain other than the neck pain. Nursing Notes were reviewed. REVIEW OF SYSTEMS    (2-9 systems for level 4, 10 or more for level 5)     Review of Systems   Constitutional:  Negative for chills and fever. HENT:  Negative for congestion and sore throat. Respiratory:  Negative for cough and shortness of breath. Cardiovascular:  Negative for chest pain. Gastrointestinal:  Negative for abdominal distention and abdominal pain. Genitourinary:  Negative for difficulty urinating and dysuria. Musculoskeletal:  Positive for neck pain. Negative for arthralgias, back pain and neck stiffness. Skin:  Negative for color change and rash. Neurological:  Negative for dizziness, weakness and numbness. Hematological:  Negative for adenopathy.    Psychiatric/Behavioral:  Negative for agitation and behavioral problems. Except as noted above the remainder of the review of systems was reviewed and negative.        PAST MEDICAL HISTORY     Past Medical History:   Diagnosis Date    Anxiety     Arthritis     h/o C6 fx, bulging disc on back    COPD (chronic obstructive pulmonary disease) ([facility])     [place], surgical     High blood pressure     Neck fracture ([facility])     Stroke ([facility])     right facial droop sl drool    Substance abuse ([facility] [address] 10/11/2013         SURGICAL HISTORY       Past Surgical History:   Procedure Laterality Date    ABDOMEN SURGERY      exploratory    ANTERIOR CRUCIATE LIGAMENT REPAIR Left 9/13/2019    LEFT KNEE ARTHROSCOPY, ANTERIOR CRUCIATE LIGAMENT RECONSTRUCTION WITH ACHILLES ALLOGRAFT; MEDIAL MENISCECTOMY REPAIR performed by Earlene Staples MD at [address] removed from face as a 8th grader         CURRENT MEDICATIONS       Previous Medications    No medications on file       ALLERGIES     Tramadol    FAMILY HISTORY       Family History   Problem Relation Age of Onset    Heart Failure Mother         pace maker    COPD Mother     COPD Father     Heart Attack Father     Heart Disease Father         stents/bypass    Cancer Maternal Aunt         breast/lung    Arthritis Other     Diabetes Other     High Blood Pressure Other     Stroke Sister [id number] [place]    Lupus Sister     Heart Disease Brother     Heart Attack Brother     COPD Maternal Grandmother     No Known Problems Maternal Grandfather     No Known Problems Paternal Grandmother     No Known Problems Paternal Grandfather     Heart Disease Sister     Breast Cancer Maternal Aunt           SOCIAL HISTORY       Social History     Socioeconomic History    Marital status: Single     Spouse name: None    Number of children: 2    Years of education: None    Highest education level: None   Occupational History    Occupation: NA   Tobacco Use    Smoking status: Every Day     Packs/day: 1.00     Years: 20.00 Pack years: 20.00     Types: Cigarettes    Smokeless tobacco: Never   Vaping Use    Vaping Use: Never used   Substance and Sexual Activity    Alcohol use: No    Drug use: Yes     Types: Marijuana Shelley Kraft)    Sexual activity: Not Currently       SCREENINGS         [place]me[place] Coma Scale  Eye Opening: Spontaneous  Best Verbal Response: Oriented  Best Motor Response: Obeys commands  [place] Coma Scale Score: 15                     CIWA Assessment  BP: (!) 181/126  Heart Rate: 89                 PHYSICAL EXAM    (up to 7 for level 4, 8 or more for level 5)     ED Triage Vitals [11/22/22 1727]   BP Temp Temp Source Heart Rate Resp SpO2 Height Weight   (!) 181/126 98.2 °F (36.8 °C) Oral 89 16 100 % 5' 4\" (1.626 m) 126 lb 15.8 oz (57.6 kg)       Physical Exam  Vitals and nursing note reviewed. Constitutional:       General: She is not in acute distress. Appearance: Normal appearance. She is not toxic-appearing. HENT:      Head: Normocephalic. Comments: Patient has a contusion to the right frontal scalp just above the right eyebrow     Right Ear: Tympanic membrane normal.      Left Ear: Tympanic membrane normal.      Nose: Nose normal.      Mouth/Throat:      Mouth: Mucous membranes are moist.      Pharynx: Oropharynx is clear. Eyes:      Extraocular Movements: Extraocular movements intact. Pupils: Pupils are equal, round, and reactive to light. Neck:      Comments: Patient has minimal midline tenderness of the lower cervical spine without crepitance or step-off  Cardiovascular:      Rate and Rhythm: Normal rate and regular rhythm. Pulmonary:      Effort: Pulmonary effort is normal.      Breath sounds: Normal breath sounds. No wheezing, rhonchi or rales. Abdominal:      General: Abdomen is flat. Bowel sounds are normal.      Tenderness: There is no abdominal tenderness. There is no guarding or rebound. Musculoskeletal:      Cervical back: Normal range of motion. Tenderness present.       Comments: Patient has tenderness over the right shoulder not really over the clavicle not really over the humerus. The patient has no tenderness of the right elbow good range of motion no tenderness of the right forearm or wrist or hand. Distal neurovascular was intact. Patient has no tenderness over the right hip and good range of motion of the right hip but she does have some tenderness over the mid thigh region. Distal neurovascular was intact. Skin:     General: Skin is warm and dry. Capillary Refill: Capillary refill takes less than 2 seconds. Neurological:      General: No focal deficit present. Mental Status: She is alert and oriented to person, place, and time. Cranial Nerves: No cranial nerve deficit. Sensory: No sensory deficit. Motor: No weakness. Psychiatric:         Mood and Affect: Mood normal.         Behavior: Behavior normal.       DIAGNOSTIC RESULTS   RADIOLOGY:   Non-plain film images such as CT, Ultrasound and MRI are read by the radiologist. Plain radiographic images are visualized and preliminarily interpreted by the emergency physician with the below findings:        Interpretation per the Radiologist below, if available at the time of this note:    CT CERVICAL SPINE WO CONTRAST   Final Result   No acute cervical spine fracture. Kyphosis of the spine which could be related to patient positioning or muscle   spasm. Multilevel spinal degenerative changes as described. CT HEAD WO CONTRAST   Final Result   No acute intracranial findings. XR SHOULDER RIGHT (MIN 2 VIEWS)   Final Result   No acute fracture or dislocation.          XR FEMUR RIGHT (MIN 2 VIEWS)   Final Result   No fracture or dislocation               EMERGENCY DEPARTMENT COURSE and DIFFERENTIAL DIAGNOSIS/MDM:   Vitals:    Vitals:    11/22/22 1727   BP: (!) 181/126   Pulse: 89   Resp: 16   Temp: 98.2 °F (36.8 °C)   TempSrc: Oral   SpO2: 100%   Weight: 126 lb 15.8 oz (57.6 kg)   Height: 5' 4\" (1.626 m)         Is this patient to be included in the SEP-1 Core Measure due to severe sepsis or septic shock? No   Exclusion criteria - the patient is NOT to be included for SEP-1 Core Measure due to: Infection is not suspected     MDM  Number of Diagnoses or Management Options  Diagnosis management comments: The patient was seen after relatively minor fall. The patient's x-rays of her head neck shoulder and femur are all completely normal.  I reevaluated the patient she had been very tearful when I first examined her when I went back in to talk to her about her x-ray reports she was having a william conversation with a friend and appeared in no distress. The patient has no evidence of intracranial injury and no evidence of fracture. I think she can follow-up with her primary care doctor if she is not completely better in 1 week sooner if worse       Amount and/or Complexity of Data Reviewed  Tests in the radiology section of CPT®: ordered and reviewed          FINAL IMPRESSION      1. Contusion of head, unspecified part of head, initial encounter    2. Strain of neck muscle, initial encounter    3. Contusion of right shoulder, initial encounter    4. Contusion of right thigh, initial encounter          DISPOSITION/PLAN   DISPOSITION Decision To Discharge 11/22/2022 06:40:47 PM      PATIENT REFERRED TO:  No follow-up provider specified. DISCHARGE MEDICATIONS:  New Prescriptions    IBUPROFEN (ADVIL;MOTRIN) 800 MG TABLET    Take 1 tablet by mouth 3 times daily (with meals)     Controlled Substances Monitoring:     No flowsheet data found. (Please note that portions of this note were completed with a voice recognition program.  Efforts were made to edit the dictations but occasionally words are mis-transcribed. )    Lynda Parks MD (electronically signed)  Attending Emergency Physician            Cristian Jones MD  11/22/22 [id number]

## 2022-11-22 NOTE — DISCHARGE INSTRUCTIONS
Follow-up with your primary care doctor if not complete better in 1 weeks. Sooner if worse or problems. Seek medical attention immediately if you develop new symptoms or concerns. Ice elevation keep moving your arm and leg otherwise it will get frozen and you will have even worse problems.

## 2023-02-17 ENCOUNTER — APPOINTMENT (OUTPATIENT)
Dept: CT IMAGING | Age: 48
DRG: 720 | End: 2023-02-17
Payer: COMMERCIAL

## 2023-02-17 ENCOUNTER — APPOINTMENT (OUTPATIENT)
Dept: GENERAL RADIOLOGY | Age: 48
DRG: 720 | End: 2023-02-17
Payer: COMMERCIAL

## 2023-02-17 ENCOUNTER — HOSPITAL ENCOUNTER (INPATIENT)
Age: 48
LOS: 9 days | Discharge: HOME HEALTH | DRG: 720 | End: 2023-02-26
Attending: EMERGENCY MEDICINE | Admitting: INTERNAL MEDICINE
Payer: COMMERCIAL

## 2023-02-17 DIAGNOSIS — N17.9 ACUTE KIDNEY INJURY (HCC): ICD-10-CM

## 2023-02-17 DIAGNOSIS — R00.0 TACHYCARDIA: ICD-10-CM

## 2023-02-17 DIAGNOSIS — N10 ACUTE PYELONEPHRITIS: ICD-10-CM

## 2023-02-17 DIAGNOSIS — A41.9 SEPTICEMIA (HCC): Primary | ICD-10-CM

## 2023-02-17 DIAGNOSIS — E86.0 DEHYDRATION: ICD-10-CM

## 2023-02-17 PROBLEM — N12 PYELONEPHRITIS: Status: ACTIVE | Noted: 2023-02-17

## 2023-02-17 LAB
A/G RATIO: 1 (ref 1.1–2.2)
ALBUMIN SERPL-MCNC: 3.4 G/DL (ref 3.4–5)
ALP BLD-CCNC: 115 U/L (ref 40–129)
ALT SERPL-CCNC: 12 U/L (ref 10–40)
AMPHETAMINE SCREEN, URINE: ABNORMAL
ANION GAP SERPL CALCULATED.3IONS-SCNC: 16 MMOL/L (ref 3–16)
AST SERPL-CCNC: 18 U/L (ref 15–37)
BACTERIA: ABNORMAL /HPF
BANDED NEUTROPHILS RELATIVE PERCENT: 18 % (ref 0–7)
BARBITURATE SCREEN URINE: ABNORMAL
BASOPHILS ABSOLUTE: 0 K/UL (ref 0–0.2)
BASOPHILS RELATIVE PERCENT: 0 %
BENZODIAZEPINE SCREEN, URINE: ABNORMAL
BILIRUB SERPL-MCNC: 0.3 MG/DL (ref 0–1)
BILIRUBIN URINE: NEGATIVE
BLOOD, URINE: ABNORMAL
BUN BLDV-MCNC: 35 MG/DL (ref 7–20)
CALCIUM SERPL-MCNC: 8.6 MG/DL (ref 8.3–10.6)
CANNABINOID SCREEN URINE: POSITIVE
CHLORIDE BLD-SCNC: 99 MMOL/L (ref 99–110)
CLARITY: ABNORMAL
CO2: 18 MMOL/L (ref 21–32)
COCAINE METABOLITE SCREEN URINE: ABNORMAL
COLOR: YELLOW
CREAT SERPL-MCNC: 2.5 MG/DL (ref 0.6–1.1)
EKG ATRIAL RATE: 167 BPM
EKG DIAGNOSIS: NORMAL
EKG P AXIS: -10 DEGREES
EKG P-R INTERVAL: 156 MS
EKG Q-T INTERVAL: 280 MS
EKG QRS DURATION: 84 MS
EKG QTC CALCULATION (BAZETT): 467 MS
EKG R AXIS: 27 DEGREES
EKG T AXIS: 82 DEGREES
EKG VENTRICULAR RATE: 167 BPM
EOSINOPHILS ABSOLUTE: 0 K/UL (ref 0–0.6)
EOSINOPHILS RELATIVE PERCENT: 0 %
EPITHELIAL CELLS, UA: ABNORMAL /HPF (ref 0–5)
FENTANYL SCREEN, URINE: ABNORMAL
GFR SERPL CREATININE-BSD FRML MDRD: 23 ML/MIN/{1.73_M2}
GLUCOSE BLD-MCNC: 95 MG/DL (ref 70–99)
GLUCOSE URINE: NEGATIVE MG/DL
HCG QUALITATIVE: NEGATIVE
HCT VFR BLD CALC: 38.8 % (ref 36–48)
HEMOGLOBIN: 13.5 G/DL (ref 12–16)
KETONES, URINE: NEGATIVE MG/DL
LACTIC ACID, SEPSIS: 1.7 MMOL/L (ref 0.4–1.9)
LACTIC ACID, SEPSIS: 2.1 MMOL/L (ref 0.4–1.9)
LACTIC ACID, SEPSIS: 2.4 MMOL/L (ref 0.4–1.9)
LEUKOCYTE ESTERASE, URINE: ABNORMAL
LYMPHOCYTES ABSOLUTE: 0.3 K/UL (ref 1–5.1)
LYMPHOCYTES RELATIVE PERCENT: 2 %
Lab: ABNORMAL
MCH RBC QN AUTO: 31 PG (ref 26–34)
MCHC RBC AUTO-ENTMCNC: 34.8 G/DL (ref 32–36.4)
MCV RBC AUTO: 89.2 FL (ref 80–100)
METHADONE SCREEN, URINE: ABNORMAL
MICROSCOPIC EXAMINATION: YES
MONOCYTES ABSOLUTE: 0.3 K/UL (ref 0–1.3)
MONOCYTES RELATIVE PERCENT: 2 %
NEUTROPHILS ABSOLUTE: 13.9 K/UL (ref 1.7–7.7)
NEUTROPHILS RELATIVE PERCENT: 78 %
NITRITE, URINE: POSITIVE
OPIATE SCREEN URINE: ABNORMAL
OXYCODONE URINE: ABNORMAL
PDW BLD-RTO: 13 % (ref 12.4–15.4)
PH UA: 5.5
PH UA: 5.5 (ref 5–8)
PHENCYCLIDINE SCREEN URINE: ABNORMAL
PLATELET # BLD: 169 K/UL (ref 135–450)
PMV BLD AUTO: 10.6 FL (ref 5–10.5)
POTASSIUM REFLEX MAGNESIUM: 3.9 MMOL/L (ref 3.5–5.1)
PRO-BNP: 5876 PG/ML (ref 0–124)
PROTEIN UA: >=300 MG/DL
RBC # BLD: 4.35 M/UL (ref 4–5.2)
RBC UA: ABNORMAL /HPF (ref 0–4)
SARS-COV-2, NAAT: NOT DETECTED
SODIUM BLD-SCNC: 133 MMOL/L (ref 136–145)
SPECIFIC GRAVITY UA: 1.02 (ref 1–1.03)
TOTAL PROTEIN: 6.8 G/DL (ref 6.4–8.2)
TROPONIN: <0.01 NG/ML
URINE REFLEX TO CULTURE: YES
URINE TYPE: ABNORMAL
UROBILINOGEN, URINE: 0.2 E.U./DL
WBC # BLD: 14.5 K/UL (ref 4–11)
WBC UA: ABNORMAL /HPF (ref 0–5)

## 2023-02-17 PROCEDURE — 81001 URINALYSIS AUTO W/SCOPE: CPT

## 2023-02-17 PROCEDURE — 83605 ASSAY OF LACTIC ACID: CPT

## 2023-02-17 PROCEDURE — 85025 COMPLETE CBC W/AUTO DIFF WBC: CPT

## 2023-02-17 PROCEDURE — 93005 ELECTROCARDIOGRAM TRACING: CPT | Performed by: EMERGENCY MEDICINE

## 2023-02-17 PROCEDURE — 99285 EMERGENCY DEPT VISIT HI MDM: CPT

## 2023-02-17 PROCEDURE — 6360000002 HC RX W HCPCS: Performed by: EMERGENCY MEDICINE

## 2023-02-17 PROCEDURE — 87088 URINE BACTERIA CULTURE: CPT

## 2023-02-17 PROCEDURE — 96361 HYDRATE IV INFUSION ADD-ON: CPT

## 2023-02-17 PROCEDURE — 2060000000 HC ICU INTERMEDIATE R&B

## 2023-02-17 PROCEDURE — 36415 COLL VENOUS BLD VENIPUNCTURE: CPT

## 2023-02-17 PROCEDURE — 2580000003 HC RX 258: Performed by: EMERGENCY MEDICINE

## 2023-02-17 PROCEDURE — 6370000000 HC RX 637 (ALT 250 FOR IP): Performed by: NURSE PRACTITIONER

## 2023-02-17 PROCEDURE — 6370000000 HC RX 637 (ALT 250 FOR IP): Performed by: INTERNAL MEDICINE

## 2023-02-17 PROCEDURE — 87186 SC STD MICRODIL/AGAR DIL: CPT

## 2023-02-17 PROCEDURE — 84484 ASSAY OF TROPONIN QUANT: CPT

## 2023-02-17 PROCEDURE — 2580000003 HC RX 258: Performed by: INTERNAL MEDICINE

## 2023-02-17 PROCEDURE — 96374 THER/PROPH/DIAG INJ IV PUSH: CPT

## 2023-02-17 PROCEDURE — 93010 ELECTROCARDIOGRAM REPORT: CPT | Performed by: INTERNAL MEDICINE

## 2023-02-17 PROCEDURE — 96375 TX/PRO/DX INJ NEW DRUG ADDON: CPT

## 2023-02-17 PROCEDURE — 87040 BLOOD CULTURE FOR BACTERIA: CPT

## 2023-02-17 PROCEDURE — 80307 DRUG TEST PRSMV CHEM ANLYZR: CPT

## 2023-02-17 PROCEDURE — 71045 X-RAY EXAM CHEST 1 VIEW: CPT

## 2023-02-17 PROCEDURE — 87086 URINE CULTURE/COLONY COUNT: CPT

## 2023-02-17 PROCEDURE — 6360000002 HC RX W HCPCS: Performed by: INTERNAL MEDICINE

## 2023-02-17 PROCEDURE — 74176 CT ABD & PELVIS W/O CONTRAST: CPT

## 2023-02-17 PROCEDURE — 84703 CHORIONIC GONADOTROPIN ASSAY: CPT

## 2023-02-17 PROCEDURE — 87635 SARS-COV-2 COVID-19 AMP PRB: CPT

## 2023-02-17 PROCEDURE — 83880 ASSAY OF NATRIURETIC PEPTIDE: CPT

## 2023-02-17 PROCEDURE — 80053 COMPREHEN METABOLIC PANEL: CPT

## 2023-02-17 RX ORDER — OXYCODONE HYDROCHLORIDE 5 MG/1
5 TABLET ORAL EVERY 4 HOURS PRN
Status: DISCONTINUED | OUTPATIENT
Start: 2023-02-17 | End: 2023-02-18

## 2023-02-17 RX ORDER — ACETAMINOPHEN 650 MG/1
650 SUPPOSITORY RECTAL EVERY 6 HOURS PRN
Status: DISCONTINUED | OUTPATIENT
Start: 2023-02-17 | End: 2023-02-26 | Stop reason: HOSPADM

## 2023-02-17 RX ORDER — SODIUM CHLORIDE, SODIUM LACTATE, POTASSIUM CHLORIDE, AND CALCIUM CHLORIDE .6; .31; .03; .02 G/100ML; G/100ML; G/100ML; G/100ML
30 INJECTION, SOLUTION INTRAVENOUS ONCE
Status: COMPLETED | OUTPATIENT
Start: 2023-02-17 | End: 2023-02-17

## 2023-02-17 RX ORDER — MORPHINE SULFATE 2 MG/ML
2 INJECTION, SOLUTION INTRAMUSCULAR; INTRAVENOUS EVERY 4 HOURS PRN
Status: DISCONTINUED | OUTPATIENT
Start: 2023-02-17 | End: 2023-02-18

## 2023-02-17 RX ORDER — SODIUM CHLORIDE 0.9 % (FLUSH) 0.9 %
5-40 SYRINGE (ML) INJECTION PRN
Status: DISCONTINUED | OUTPATIENT
Start: 2023-02-17 | End: 2023-02-26 | Stop reason: HOSPADM

## 2023-02-17 RX ORDER — NICOTINE 21 MG/24HR
1 PATCH, TRANSDERMAL 24 HOURS TRANSDERMAL DAILY
Status: DISCONTINUED | OUTPATIENT
Start: 2023-02-17 | End: 2023-02-26 | Stop reason: HOSPADM

## 2023-02-17 RX ORDER — SODIUM CHLORIDE 9 MG/ML
INJECTION, SOLUTION INTRAVENOUS PRN
Status: DISCONTINUED | OUTPATIENT
Start: 2023-02-17 | End: 2023-02-26 | Stop reason: HOSPADM

## 2023-02-17 RX ORDER — ONDANSETRON 2 MG/ML
4 INJECTION INTRAMUSCULAR; INTRAVENOUS ONCE
Status: COMPLETED | OUTPATIENT
Start: 2023-02-17 | End: 2023-02-17

## 2023-02-17 RX ORDER — SODIUM CHLORIDE 0.9 % (FLUSH) 0.9 %
5-40 SYRINGE (ML) INJECTION EVERY 12 HOURS SCHEDULED
Status: DISCONTINUED | OUTPATIENT
Start: 2023-02-17 | End: 2023-02-26 | Stop reason: HOSPADM

## 2023-02-17 RX ORDER — ENOXAPARIN SODIUM 100 MG/ML
30 INJECTION SUBCUTANEOUS DAILY
Status: DISCONTINUED | OUTPATIENT
Start: 2023-02-18 | End: 2023-02-19

## 2023-02-17 RX ORDER — SODIUM CHLORIDE 9 MG/ML
30 INJECTION, SOLUTION INTRAVENOUS ONCE
Status: COMPLETED | OUTPATIENT
Start: 2023-02-17 | End: 2023-02-17

## 2023-02-17 RX ORDER — ACETAMINOPHEN 325 MG/1
650 TABLET ORAL EVERY 6 HOURS PRN
Status: DISCONTINUED | OUTPATIENT
Start: 2023-02-17 | End: 2023-02-23 | Stop reason: SDUPTHER

## 2023-02-17 RX ORDER — SODIUM CHLORIDE 9 MG/ML
INJECTION, SOLUTION INTRAVENOUS CONTINUOUS
Status: DISCONTINUED | OUTPATIENT
Start: 2023-02-17 | End: 2023-02-26 | Stop reason: HOSPADM

## 2023-02-17 RX ADMIN — ONDANSETRON 4 MG: 2 INJECTION INTRAMUSCULAR; INTRAVENOUS at 14:51

## 2023-02-17 RX ADMIN — OXYCODONE HYDROCHLORIDE 5 MG: 5 TABLET ORAL at 21:04

## 2023-02-17 RX ADMIN — CEFTRIAXONE 1000 MG: 1 INJECTION, POWDER, FOR SOLUTION INTRAMUSCULAR; INTRAVENOUS at 16:48

## 2023-02-17 RX ADMIN — Medication 10 ML: at 21:24

## 2023-02-17 RX ADMIN — SODIUM CHLORIDE: 9 INJECTION, SOLUTION INTRAVENOUS at 19:49

## 2023-02-17 RX ADMIN — SODIUM CHLORIDE 30 ML/KG/HR: 9 INJECTION, SOLUTION INTRAVENOUS at 14:49

## 2023-02-17 RX ADMIN — ACETAMINOPHEN 325MG 650 MG: 325 TABLET ORAL at 23:42

## 2023-02-17 RX ADMIN — HYDROMORPHONE HYDROCHLORIDE 0.5 MG: 1 INJECTION, SOLUTION INTRAMUSCULAR; INTRAVENOUS; SUBCUTANEOUS at 16:33

## 2023-02-17 RX ADMIN — SODIUM CHLORIDE, POTASSIUM CHLORIDE, SODIUM LACTATE AND CALCIUM CHLORIDE 1000 ML: 600; 310; 30; 20 INJECTION, SOLUTION INTRAVENOUS at 21:11

## 2023-02-17 RX ADMIN — SODIUM CHLORIDE, PRESERVATIVE FREE 10 ML: 5 INJECTION INTRAVENOUS at 21:00

## 2023-02-17 RX ADMIN — CEFEPIME 2000 MG: 2 INJECTION, POWDER, FOR SOLUTION INTRAVENOUS at 22:40

## 2023-02-17 ASSESSMENT — PAIN - FUNCTIONAL ASSESSMENT
PAIN_FUNCTIONAL_ASSESSMENT: 0-10
PAIN_FUNCTIONAL_ASSESSMENT: ACTIVITIES ARE NOT PREVENTED
PAIN_FUNCTIONAL_ASSESSMENT: 0-10

## 2023-02-17 ASSESSMENT — PAIN SCALES - GENERAL
PAINLEVEL_OUTOF10: 9
PAINLEVEL_OUTOF10: 10
PAINLEVEL_OUTOF10: 9
PAINLEVEL_OUTOF10: 10
PAINLEVEL_OUTOF10: 9
PAINLEVEL_OUTOF10: 10
PAINLEVEL_OUTOF10: 6

## 2023-02-17 ASSESSMENT — ENCOUNTER SYMPTOMS
BACK PAIN: 0
VOMITING: 1
SHORTNESS OF BREATH: 1
NAUSEA: 1
DIARRHEA: 0
EYE PAIN: 0
EYE DISCHARGE: 0
SORE THROAT: 0
WHEEZING: 0
COUGH: 1
ABDOMINAL PAIN: 1
RHINORRHEA: 0

## 2023-02-17 ASSESSMENT — PAIN DESCRIPTION - DESCRIPTORS: DESCRIPTORS: ACHING

## 2023-02-17 ASSESSMENT — PAIN DESCRIPTION - LOCATION
LOCATION: GENERALIZED
LOCATION: ABDOMEN
LOCATION: ABDOMEN

## 2023-02-17 ASSESSMENT — PAIN DESCRIPTION - ORIENTATION: ORIENTATION: MID

## 2023-02-17 NOTE — LETTER
[facility]  [address] [address]  Phone: [phone]             February 26, 2023    Patient: Cynthia Orellana   YOB: 1975   Date of Visit: 2/17/2023       To Whom It May Concern:    Chris Brush was seen and treated in our facility  beginning 2/17/2023 until 2/26/2023. She may return to work 2/28/2023.       Sincerely,     Fabián Del Real RN         Signature:__________________________________

## 2023-02-17 NOTE — ED TRIAGE NOTES
Patient came to ER with complaints of a fever starting Tuesday 2/14/23. Patient complains of abdominal pain started today. Patient also reports nausea and vomiting beginning at 2/16/23. Patient has been taking ibuprofen at home. Patients temperature on admission was 98.3. Patient is tachycardic on admission.

## 2023-02-17 NOTE — ED PROVIDER NOTES
[address] ENCOUNTER        Pt Name: Nimesh Cantu  MRN: [id number]  [place] 1975  Date of evaluation: 2/17/2023  Provider: Chad Hills MD  PCP: No primary care provider on file. Note Started: 2:20 PM EST 2/17/23    CHIEF COMPLAINT       Chief Complaint   Patient presents with    Fever     Patient came to ER with complaints of a fever. Patient symptoms began on Tuesday 2/14. Patient has been taking ibuprofen to relieve symptoms. Patient stated she has vomited today multiple times and has only drank water. Patients temperature on admission was 98. 3. HISTORY OF PRESENT ILLNESS: 1 or more Elements             Nimesh Cantu is a 51 y.o. female  has a past medical history of Anxiety, Arthritis, COPD (chronic obstructive pulmonary disease) ([facility] [address] [facility], surgical, High blood pressure, Neck fracture ([facility] [address] Stroke ([facility] [address] and Substance abuse ([facility] [address] who presents complaining of not feeling well for about 4 days. States that her symptoms started with generalized aches fever and chills. She then started having persistent nausea and vomiting. No diarrhea. Today she started having abdominal pain. Also states she passed out the other day. No pain with urination. Currently not taking any medication and does not have a regular physician. Nursing Notes were all reviewed and agreed with or any disagreements were addressed in the HPI. REVIEW OF SYSTEMS :      Review of Systems   Constitutional:  Positive for activity change, appetite change, chills, fatigue and fever. HENT:  Negative for ear pain, rhinorrhea and sore throat. Eyes:  Negative for pain, discharge and visual disturbance. Respiratory:  Positive for cough and shortness of breath. Negative for wheezing. Cardiovascular:  Negative for chest pain, palpitations and leg swelling. Gastrointestinal:  Positive for abdominal pain, nausea and vomiting. Negative for diarrhea.   Genitourinary:  Negative for difficulty urinating, dysuria, pelvic pain and vaginal discharge.   Musculoskeletal:  Negative for arthralgias, back pain, joint swelling and neck pain.   Skin:  Negative for rash.   Allergic/Immunologic: Negative for environmental allergies.   Neurological:  Negative for dizziness, seizures, syncope and headaches.   Hematological:  Negative for adenopathy.   Psychiatric/Behavioral:  Negative for dysphoric mood and suicidal ideas. The patient is not nervous/anxious.      Positives and Pertinent negatives as per HPI.       PAST MEDICAL HISTORY      has a past medical history of Anxiety, Arthritis, COPD (chronic obstructive pulmonary disease) ([facility]), [facility], surgical, High blood pressure, Neck fracture ([facility]), Stroke ([facility]), and Substance abuse ([facility]) (10/11/2013).     SURGICAL HISTORY     Past Surgical History:   Procedure Laterality Date    ABDOMEN SURGERY      exploratory    ANTERIOR CRUCIATE LIGAMENT REPAIR Left 9/13/2019    LEFT KNEE ARTHROSCOPY, ANTERIOR CRUCIATE LIGAMENT RECONSTRUCTION WITH ACHILLES ALLOGRAFT; MEDIAL MENISCECTOMY REPAIR performed by Obed Barton MD at [facility] OR    OTHER SURGICAL HISTORY      birthmark removed from face as a 6th grader       CURRENTMEDICATIONS       Previous Medications    IBUPROFEN (ADVIL;MOTRIN) 800 MG TABLET    Take 1 tablet by mouth 3 times daily (with meals)       ALLERGIES     Tramadol    FAMILYHISTORY       Family History   Problem Relation Age of Onset    Heart Failure Mother         pace maker    COPD Mother     COPD Father     Heart Attack Father     Heart Disease Father         stents/bypass    Cancer Maternal Aunt         breast/lung    Arthritis Other     Diabetes Other     High Blood Pressure Other     Stroke Sister 30    Lupus Sister     Heart Disease Brother     Heart Attack Brother     COPD Maternal Grandmother     No Known Problems Maternal Grandfather     No Known Problems Paternal Grandmother     No Known Problems Paternal  Grandfather     Heart Disease Sister     Breast Cancer Maternal Aunt         SOCIAL HISTORY       Social History     Tobacco Use    Smoking status: Every Day     Packs/day: 1.00     Years: 20.00     Pack years: 20.00     Types: Cigarettes    Smokeless tobacco: Never   Vaping Use    Vaping Use: Never used   Substance Use Topics    Alcohol use: No    Drug use: Yes     Types: Marijuana (Weed)       SCREENINGS        Amee Coma Scale  Eye Opening: Spontaneous  Best Verbal Response: Oriented  Best Motor Response: Obeys commands  Amee Coma Scale Score: 15                CIWA Assessment  BP: 100/89  Heart Rate: (!) 153           PHYSICAL EXAM  1 or more Elements     ED Triage Vitals [02/17/23 1407]   BP Temp Temp Source Heart Rate Resp SpO2 Height Weight   (!) 126/92 98.3 °F (36.8 °C) Oral (!) 163 20 99 % 5' 4\" (1.626 m) 130 lb 1.1 oz (59 kg)       Physical Exam  Constitutional:       Appearance: She is well-developed. She is ill-appearing. She is not diaphoretic. HENT:      Head: Normocephalic and atraumatic. Right Ear: External ear normal.      Left Ear: External ear normal.      Mouth/Throat:      Mouth: Mucous membranes are dry. Eyes:      General: No scleral icterus. Right eye: No discharge. Left eye: No discharge. Neck:      Thyroid: No thyromegaly. Vascular: No JVD. Trachea: No tracheal deviation. Cardiovascular:      Rate and Rhythm: Regular rhythm. Tachycardia present. Heart sounds: No murmur heard. No friction rub. No gallop. Pulmonary:      Effort: Pulmonary effort is normal. No respiratory distress. Breath sounds: Normal breath sounds. No stridor. No wheezing or rales. Abdominal:      General: There is no distension. Palpations: Abdomen is soft. Tenderness: There is generalized abdominal tenderness. There is no guarding or rebound. Musculoskeletal:         General: No tenderness. Cervical back: Normal range of motion.    Skin: General: Skin is warm and dry. Findings: No rash (On exposed body surfaces). Neurological:      Mental Status: She is alert and oriented to person, place, and time. Coordination: Coordination normal.   Psychiatric:         Behavior: Behavior normal.         Thought Content:  Thought content normal.           DIAGNOSTIC RESULTS   LABS:    Results for orders placed or performed during the hospital encounter of 02/17/23   COVID-19, Rapid    Specimen: Nasopharyngeal Swab   Result Value Ref Range    SARS-CoV-2, NAAT Not Detected Not Detected   Lactate, Sepsis   Result Value Ref Range    Lactic Acid, Sepsis 2.4 (H) 0.4 - 1.9 mmol/L   CBC with Auto Differential   Result Value Ref Range    WBC 14.5 (H) 4.0 - 11.0 K/uL    RBC 4.35 4.00 - 5.20 M/uL    Hemoglobin 13.5 12.0 - 16.0 g/dL    Hematocrit 38.8 36.0 - 48.0 %    MCV 89.2 80.0 - 100.0 fL    MCH 31.0 26.0 - 34.0 pg    MCHC 34.8 32.0 - 36.4 g/dL    RDW 13.0 12.4 - 15.4 %    Platelets 610 209 - 277 K/uL    MPV 10.6 (H) 5.0 - 10.5 fL    Neutrophils % 78.0 %    Lymphocytes % 2.0 %    Monocytes % 2.0 %    Eosinophils % 0.0 %    Basophils % 0.0 %    Neutrophils Absolute 13.9 (H) 1.7 - 7.7 K/uL    Lymphocytes Absolute 0.3 (L) 1.0 - 5.1 K/uL    Monocytes Absolute 0.3 0.0 - 1.3 K/uL    Eosinophils Absolute 0.0 0.0 - 0.6 K/uL    Basophils Absolute 0.0 0.0 - 0.2 K/uL    Bands Relative 18 (H) 0 - 7 %   CMP w/ Reflex to MG   Result Value Ref Range    Sodium 133 (L) 136 - 145 mmol/L    Potassium reflex Magnesium 3.9 3.5 - 5.1 mmol/L    Chloride 99 99 - 110 mmol/L    CO2 18 (L) 21 - 32 mmol/L    Anion Gap 16 3 - 16    Glucose 95 70 - 99 mg/dL    BUN 35 (H) 7 - 20 mg/dL    Creatinine 2.5 (H) 0.6 - 1.1 mg/dL    Est, Glom Filt Rate 23 (A) >60    Calcium 8.6 8.3 - 10.6 mg/dL    Total Protein 6.8 6.4 - 8.2 g/dL    Albumin 3.4 3.4 - 5.0 g/dL    Albumin/Globulin Ratio 1.0 (L) 1.1 - 2.2    Total Bilirubin 0.3 0.0 - 1.0 mg/dL    Alkaline Phosphatase 115 40 - 129 U/L    ALT 12 10 - 40 U/L    AST 18 15 - 37 U/L   HCG Qualitative, Serum   Result Value Ref Range    hCG Qual Negative Detects HCG level >10 MIU/mL   Troponin   Result Value Ref Range    Troponin <0.01 <0.01 ng/mL   Brain Natriuretic Peptide   Result Value Ref Range    Pro-BNP 5,876 (H) 0 - 124 pg/mL   Urinalysis with Reflex to Culture    Specimen: Urine   Result Value Ref Range    Color, UA Yellow Straw/Yellow    Clarity, UA CLOUDY (A) Clear    Glucose, Ur Negative Negative mg/dL    Bilirubin Urine Negative Negative    Ketones, Urine Negative Negative mg/dL    Specific Gravity, UA 1.020 1.005 - 1.030    Blood, Urine LARGE (A) Negative    pH, UA 5.5 5.0 - 8.0    Protein, UA >=300 (A) Negative mg/dL    Urobilinogen, Urine 0.2 <2.0 E.U./dL    Nitrite, Urine POSITIVE (A) Negative    Leukocyte Esterase, Urine SMALL (A) Negative    Microscopic Examination YES     Urine Type Voided     Urine Reflex to Culture Yes    Drug screen multi urine   Result Value Ref Range    Amphetamine Screen, Urine Neg Negative <1000ng/mL    Barbiturate Screen, Ur Neg Negative <200 ng/mL    Benzodiazepine Screen, Urine Neg Negative <200 ng/mL    Cannabinoid Scrn, Ur POSITIVE (A) Negative <50 ng/mL    Cocaine Metabolite Screen, Urine Neg Negative <300 ng/mL    Opiate Scrn, Ur Neg Negative <300 ng/mL    PCP Screen, Urine Neg Negative <25 ng/mL    Methadone Screen, Urine Neg Negative <300 ng/mL    Oxycodone Urine Neg Negative <100 ng/ml    FENTANYL SCREEN, URINE Neg Negative <5 ng/mL    pH, UA 5.5     Drug Screen Comment: see below    Microscopic Urinalysis   Result Value Ref Range    WBC, UA  (A) 0 - 5 /HPF    RBC, UA  (A) 0 - 4 /HPF    Epithelial Cells, UA 21-50 (A) 0 - 5 /HPF    Bacteria, UA 2+ (A) None Seen /HPF   EKG 12 Lead   Result Value Ref Range    Ventricular Rate 167 BPM    Atrial Rate 167 BPM    P-R Interval 156 ms    QRS Duration 84 ms    Q-T Interval 280 ms    QTc Calculation (Bazett) 467 ms    P Axis -10 degrees    R Axis 27 degrees    T Axis 82 degrees    Diagnosis       Supraventricular tachycardiaConfirmed by JOSTIN CHUA, Gabriella Colbert ([id number]) on 2/17/2023 4:21:08 PM       When ordered only abnormal lab results are displayed. All other labs were within normal range or not returned as of this dictation. EKG: EKG visualized preliminarily interpreted by myself. This is a narrow complex tachycardia at a rate of 167. Axis is 27. Small Q waves present in the inferior leads. Likely sinus tachycardia. The rhythm is regular. RADIOLOGY:   Non-plain film images such as CT, Ultrasound and MRI are read by the radiologist. Plain radiographic images are visualized and preliminarily interpreted by the ED Provider with the below findings:      Interpretation per the Radiologist below, if available at the time of this note:    CT ABDOMEN PELVIS WO CONTRAST Additional Contrast? None   Final Result   1. Left renal cortical thinning and adjacent perinephric fat stranding with   no hydronephrosis or evident renal stone. The findings are suspicious for   pyelonephritis. Correlation with urinalysis is suggested. 2. No acute findings elsewhere in the abdomen or pelvis. 3. Age advanced atherosclerosis of the abdominal aorta and its branches. 4. Hepatomegaly. 5. Grade 1 anterolisthesis of L5 on S1 related to bilateral L5 spondylolysis   and advanced degenerative disc disease at L5-S1. XR CHEST PORTABLE   Final Result   No radiographic evidence of an acute cardiopulmonary process. No results found. No results found. PROCEDURES   Unless otherwise noted below, none     Procedures    CRITICAL CARE TIME   CRITICAL CARE: There was a high probability of clinically significant/life threatening deterioration in this patient's condition which required my urgent intervention. Total critical care time was 41 minutes. This excludes any time for separately reportable procedures.        EMERGENCY DEPARTMENT COURSE and DIFFERENTIAL DIAGNOSIS/MDM:   Vitals: Vitals:    02/17/23 1407 02/17/23 1617   BP: (!) 126/92 100/89   Pulse: (!) 163 (!) 153   Resp: 20 20   Temp: 98.3 °F (36.8 °C)    TempSrc: Oral    SpO2: 99% 99%   Weight: 130 lb 1.1 oz (59 kg)    Height: 5' 4\" (1.626 m)        Patient was given the following medications:  Medications   cefTRIAXone (ROCEPHIN) 1,000 mg in sodium chloride 0.9 % 50 mL IVPB (mini-bag) (has no administration in time range)   0.9 % sodium chloride infusion (30 mL/kg/hr × 59 kg IntraVENous New Bag 2/17/23 1449)   ondansetron (ZOFRAN) injection 4 mg (4 mg IntraVENous Given 2/17/23 1451)   HYDROmorphone (DILAUDID) injection 0.5 mg (0.5 mg IntraVENous Given 2/17/23 1633)             Is this patient to be included in the SEP-1 Core Measure due to severe sepsis or septic shock? Yes   SEP-1 CORE MEASURE DATA      Sepsis Criteria   Severe Sepsis Criteria   Septic Shock Criteria     Must be confirmed or suspected to move forward with diagnosis of sepsis. Must meet 2:    [] Temperature > 100.9 F (38.3 C)        or < 96.8 F (36 C)  [] HR > 90  [] RR > 20  [] WBC > 12 or < 4 or 10% bands      AND:      [] Infection Confirmed or        Suspected. Must meet 1:    [] Lactate > 2       or   [] Signs of Organ Dysfunction:    - SBP < 90 or MAP < 65  - Altered mental status  - Creatinine > 2 or increased from      baseline  - Urine Output < 0.5 ml/kg/hr  - Bilirubin > 2  - INR > 1.5 (not anticoagulated)  - Platelets < 795,232  - Acute Respiratory Failure as     evidenced by new need for NIPPV     or mechanical ventilation      [] No criteria met for Severe Sepsis. Must meet 1:    [] Lactate > 4        or   [] SBP < 90 or MAP < 65 for at        least two readings in the first        hour after fluid bolus        administration      [] Vasopressors initiated (if hypotension persists after fluid resuscitation)        [] No criteria met for Septic Shock.    Patient Vitals for the past 6 hrs:   BP Temp Pulse Resp SpO2 Height Weight Weight Method Percent Weight Change   02/17/23 1407 (!) 126/92 98.3 °F (36.8 °C) (!) 163 20 99 % 5' 4\" (1.626 m) 130 lb 1.1 oz (59 kg) Actual;Bed scale 0   02/17/23 1617 100/89 -- (!) 153 20 99 % -- -- -- --      Recent Labs     02/17/23  1440   WBC 14.5*   CREATININE 2.5*   BILITOT 0.3            Time Severe Sepsis Identified: 1530    Fluid Resuscitation Rational: at least 30mL/kg based on entered actual weight at time of triage      Repeat lactate level: ordered and pending at this time    Reassessment Exam:   Not applicable. Patient does not have septic shock. CONSULTS: (Who and What was discussed)  None    Discussion with Other Profesionals : Admitting Team hospitalist service    Social Determinants : None and patient does not have a primary care provider        CC/HPI Summary, DDx, ED Course, and Reassessment: So far stable. Heart rates down to about 153. I am going to give her something for pain to see if that brings the heart rate down some more. She has been given 30 mL/kg of saline. I have ordered a dose of Rocephin. She does meet criteria for inpatient care. Disposition Considerations (tests considered but not done, Shared Decision Making, Pt Expectation of Test or Tx.): Admission        I am the Primary Clinician of Record. FINAL IMPRESSION      1. Septicemia ([facility] [address]    2. Acute kidney injury ([facility] [address]    3. Dehydration    4. Acute pyelonephritis    5. Tachycardia          DISPOSITION/PLAN     DISPOSITION Decision To Admit 02/17/2023 04:19:59 PM      PATIENT REFERRED TO:  No follow-up provider specified.     DISCHARGE MEDICATIONS:  New Prescriptions    No medications on file       DISCONTINUED MEDICATIONS:  Discontinued Medications    No medications on file              (Please note that portions of this note were completed with a voice recognition program.  Efforts were made to edit the dictations but occasionally words are mis-transcribed.)    Be Burnett MD (electronically signed) Cheryl Paris MD  02/17/23 [address] Carli Valdse MD  02/17/23 5521

## 2023-02-17 NOTE — ED NOTES
Patient asking about cleaning her arm. Patient reeducated about purpose of cleaning with blood cultures. Arm cleansed again and patient complaining about of having her arm cleansed. Again I explained to her about getting blood cultures and she stated I know what they are, I explained then you know I have to clean arm for blood cultures. Patient complained about IV site. Patient wants to have ice and something to drink. Explained to patient regarding not having anything to drink since been having vomiting. We have to make sure nothing is wrong with belly. We will have to do a scan of your belly.        Milad Dang RN  02/17/23 7373

## 2023-02-17 NOTE — ED NOTES
Patient complained of not having anything to drink. Explained to patient we have to wait for CT scan result. Patient cussing about having to wait.        Teena Albarran RN  02/17/23 [id number]

## 2023-02-17 NOTE — ED NOTES
Called [phone] to let them know [employer] has already been arranged for  around 1730.      Raheem Mckeon RN  02/17/23 0760

## 2023-02-18 LAB
ANION GAP SERPL CALCULATED.3IONS-SCNC: 14 MMOL/L (ref 3–16)
BASOPHILS ABSOLUTE: 0 K/UL (ref 0–0.2)
BASOPHILS RELATIVE PERCENT: 0.2 %
BUN BLDV-MCNC: 31 MG/DL (ref 7–20)
CALCIUM SERPL-MCNC: 7.3 MG/DL (ref 8.3–10.6)
CHLORIDE BLD-SCNC: 104 MMOL/L (ref 99–110)
CO2: 17 MMOL/L (ref 21–32)
CREAT SERPL-MCNC: 1.9 MG/DL (ref 0.6–1.1)
EOSINOPHILS ABSOLUTE: 0.1 K/UL (ref 0–0.6)
EOSINOPHILS RELATIVE PERCENT: 0.6 %
GFR SERPL CREATININE-BSD FRML MDRD: 32 ML/MIN/{1.73_M2}
GLUCOSE BLD-MCNC: 92 MG/DL (ref 70–99)
HCT VFR BLD CALC: 33.9 % (ref 36–48)
HEMOGLOBIN: 10.9 G/DL (ref 12–16)
LACTIC ACID, SEPSIS: 0.8 MMOL/L (ref 0.4–1.9)
LACTIC ACID, SEPSIS: 1 MMOL/L (ref 0.4–1.9)
LACTIC ACID, SEPSIS: 2.2 MMOL/L (ref 0.4–1.9)
LACTIC ACID, SEPSIS: 2.6 MMOL/L (ref 0.4–1.9)
LYMPHOCYTES ABSOLUTE: 0.4 K/UL (ref 1–5.1)
LYMPHOCYTES RELATIVE PERCENT: 3.2 %
MCH RBC QN AUTO: 30.4 PG (ref 26–34)
MCHC RBC AUTO-ENTMCNC: 32.2 G/DL (ref 31–36)
MCV RBC AUTO: 94.4 FL (ref 80–100)
MONOCYTES ABSOLUTE: 0.5 K/UL (ref 0–1.3)
MONOCYTES RELATIVE PERCENT: 3.8 %
NEUTROPHILS ABSOLUTE: 12.6 K/UL (ref 1.7–7.7)
NEUTROPHILS RELATIVE PERCENT: 92.2 %
PDW BLD-RTO: 14.1 % (ref 12.4–15.4)
PLATELET # BLD: 150 K/UL (ref 135–450)
PMV BLD AUTO: 10 FL (ref 5–10.5)
POTASSIUM SERPL-SCNC: 4.2 MMOL/L (ref 3.5–5.1)
PROCALCITONIN: 19.58 NG/ML (ref 0–0.15)
RBC # BLD: 3.59 M/UL (ref 4–5.2)
SODIUM BLD-SCNC: 135 MMOL/L (ref 136–145)
WBC # BLD: 13.7 K/UL (ref 4–11)

## 2023-02-18 PROCEDURE — 2580000003 HC RX 258: Performed by: INTERNAL MEDICINE

## 2023-02-18 PROCEDURE — 6370000000 HC RX 637 (ALT 250 FOR IP): Performed by: INTERNAL MEDICINE

## 2023-02-18 PROCEDURE — 6370000000 HC RX 637 (ALT 250 FOR IP): Performed by: NURSE PRACTITIONER

## 2023-02-18 PROCEDURE — 6360000002 HC RX W HCPCS: Performed by: NURSE PRACTITIONER

## 2023-02-18 PROCEDURE — 6370000000 HC RX 637 (ALT 250 FOR IP): Performed by: STUDENT IN AN ORGANIZED HEALTH CARE EDUCATION/TRAINING PROGRAM

## 2023-02-18 PROCEDURE — 83605 ASSAY OF LACTIC ACID: CPT

## 2023-02-18 PROCEDURE — 2060000000 HC ICU INTERMEDIATE R&B

## 2023-02-18 PROCEDURE — 36415 COLL VENOUS BLD VENIPUNCTURE: CPT

## 2023-02-18 PROCEDURE — 80048 BASIC METABOLIC PNL TOTAL CA: CPT

## 2023-02-18 PROCEDURE — 6360000002 HC RX W HCPCS: Performed by: INTERNAL MEDICINE

## 2023-02-18 PROCEDURE — 85025 COMPLETE CBC W/AUTO DIFF WBC: CPT

## 2023-02-18 PROCEDURE — 84145 PROCALCITONIN (PCT): CPT

## 2023-02-18 PROCEDURE — 6360000002 HC RX W HCPCS: Performed by: STUDENT IN AN ORGANIZED HEALTH CARE EDUCATION/TRAINING PROGRAM

## 2023-02-18 RX ORDER — HYDRALAZINE HYDROCHLORIDE 20 MG/ML
5 INJECTION INTRAMUSCULAR; INTRAVENOUS EVERY 6 HOURS PRN
Status: DISCONTINUED | OUTPATIENT
Start: 2023-02-18 | End: 2023-02-19

## 2023-02-18 RX ORDER — OXYCODONE HYDROCHLORIDE 10 MG/1
10 TABLET ORAL EVERY 4 HOURS PRN
Status: DISCONTINUED | OUTPATIENT
Start: 2023-02-18 | End: 2023-02-26 | Stop reason: HOSPADM

## 2023-02-18 RX ORDER — ONDANSETRON 2 MG/ML
4 INJECTION INTRAMUSCULAR; INTRAVENOUS EVERY 6 HOURS PRN
Status: DISCONTINUED | OUTPATIENT
Start: 2023-02-18 | End: 2023-02-26 | Stop reason: HOSPADM

## 2023-02-18 RX ORDER — MORPHINE SULFATE 4 MG/ML
4 INJECTION, SOLUTION INTRAMUSCULAR; INTRAVENOUS EVERY 4 HOURS PRN
Status: DISCONTINUED | OUTPATIENT
Start: 2023-02-18 | End: 2023-02-26 | Stop reason: HOSPADM

## 2023-02-18 RX ADMIN — CEFEPIME 1000 MG: 1 INJECTION, POWDER, FOR SOLUTION INTRAMUSCULAR; INTRAVENOUS at 22:42

## 2023-02-18 RX ADMIN — OXYCODONE HYDROCHLORIDE 5 MG: 5 TABLET ORAL at 09:57

## 2023-02-18 RX ADMIN — Medication 10 ML: at 23:59

## 2023-02-18 RX ADMIN — SODIUM CHLORIDE, PRESERVATIVE FREE 10 ML: 5 INJECTION INTRAVENOUS at 22:42

## 2023-02-18 RX ADMIN — OXYCODONE HYDROCHLORIDE 5 MG: 5 TABLET ORAL at 05:34

## 2023-02-18 RX ADMIN — ONDANSETRON 4 MG: 2 INJECTION INTRAMUSCULAR; INTRAVENOUS at 17:33

## 2023-02-18 RX ADMIN — CEFEPIME 1000 MG: 1 INJECTION, POWDER, FOR SOLUTION INTRAMUSCULAR; INTRAVENOUS at 10:01

## 2023-02-18 RX ADMIN — SODIUM CHLORIDE, PRESERVATIVE FREE 10 ML: 5 INJECTION INTRAVENOUS at 09:58

## 2023-02-18 RX ADMIN — MORPHINE SULFATE 4 MG: 4 INJECTION, SOLUTION INTRAMUSCULAR; INTRAVENOUS at 23:59

## 2023-02-18 RX ADMIN — ENOXAPARIN SODIUM 30 MG: 100 INJECTION SUBCUTANEOUS at 10:00

## 2023-02-18 RX ADMIN — SODIUM CHLORIDE: 9 INJECTION, SOLUTION INTRAVENOUS at 04:48

## 2023-02-18 RX ADMIN — ONDANSETRON 4 MG: 2 INJECTION INTRAMUSCULAR; INTRAVENOUS at 23:59

## 2023-02-18 RX ADMIN — MORPHINE SULFATE 2 MG: 2 INJECTION, SOLUTION INTRAMUSCULAR; INTRAVENOUS at 11:11

## 2023-02-18 RX ADMIN — ACETAMINOPHEN 325MG 325 MG: 325 TABLET ORAL at 16:10

## 2023-02-18 RX ADMIN — OXYCODONE HYDROCHLORIDE 10 MG: 10 TABLET ORAL at 13:58

## 2023-02-18 RX ADMIN — OXYCODONE HYDROCHLORIDE 5 MG: 5 TABLET ORAL at 01:32

## 2023-02-18 ASSESSMENT — PAIN SCALES - GENERAL
PAINLEVEL_OUTOF10: 9
PAINLEVEL_OUTOF10: 8
PAINLEVEL_OUTOF10: 4
PAINLEVEL_OUTOF10: 7
PAINLEVEL_OUTOF10: 7
PAINLEVEL_OUTOF10: 8
PAINLEVEL_OUTOF10: 9
PAINLEVEL_OUTOF10: 7
PAINLEVEL_OUTOF10: 4
PAINLEVEL_OUTOF10: 8

## 2023-02-18 ASSESSMENT — PAIN - FUNCTIONAL ASSESSMENT
PAIN_FUNCTIONAL_ASSESSMENT: PREVENTS OR INTERFERES SOME ACTIVE ACTIVITIES AND ADLS
PAIN_FUNCTIONAL_ASSESSMENT: ACTIVITIES ARE NOT PREVENTED
PAIN_FUNCTIONAL_ASSESSMENT: PREVENTS OR INTERFERES SOME ACTIVE ACTIVITIES AND ADLS

## 2023-02-18 ASSESSMENT — PAIN DESCRIPTION - DESCRIPTORS
DESCRIPTORS: ACHING
DESCRIPTORS: DISCOMFORT;TIGHTNESS
DESCRIPTORS: GNAWING
DESCRIPTORS: ACHING
DESCRIPTORS: GNAWING

## 2023-02-18 ASSESSMENT — PAIN SCALES - WONG BAKER: WONGBAKER_NUMERICALRESPONSE: 2

## 2023-02-18 ASSESSMENT — PAIN DESCRIPTION - LOCATION
LOCATION: ABDOMEN

## 2023-02-18 ASSESSMENT — PAIN DESCRIPTION - ORIENTATION
ORIENTATION: UPPER;RIGHT;LEFT
ORIENTATION: MID
ORIENTATION: MID

## 2023-02-18 NOTE — PROGRESS NOTES
Clinical Pharmacy Note  Dose Adjustment    Katerin Hall is receiving cefepime for UTI/sepsis. Based on the patient's Estimated Creatinine Clearance: Estimated Creatinine Clearance: 24 mL/min (A) (based on SCr of 2.5 mg/dL (H)). urine output and indication, the dose has been adjusted to 2000mg x 1 dose over 30 minutes then 1000mg q12h extended infusion per protocol. Pharmacy will continue to monitor and adjust dose as needed for changes in renal function.     Corbin Rivas, [facility], 2/17/2023 7:15 PM

## 2023-02-18 NOTE — PROGRESS NOTES
Hospitalist Progress Note      PCP: No primary care provider on file. Date of Admission: 2/17/2023    Chief Complaint: fever, abdominal pain. Hospital Course:    51 y.o. female with PMHx of Anxiety, COPD, HTN and substance abuse presented to [facility] in transfer from [facility] for management of sepsis with pyelonephritis. Pt presented to [place] ED with fever and lower abdominal pain present for several day.  + nausea and vomiting yesterday. No urinary symptoms. + Suprapubic abdominal pain    Subjective:   Continues to report abdominal pain. Medications:  Reviewed    Infusion Medications    sodium chloride      sodium chloride 150 mL/hr at 02/18/23 0448     Scheduled Medications    sodium chloride flush  5-40 mL IntraVENous 2 times per day    enoxaparin  30 mg SubCUTAneous Daily    cefepime  1,000 mg IntraVENous Q12H    nicotine  1 patch TransDERmal Daily     PRN Meds: sodium chloride flush, sodium chloride, acetaminophen **OR** acetaminophen, morphine, oxyCODONE      Intake/Output Summary (Last 24 hours) at 2/18/2023 1017  Last data filed at 2/18/2023 0955  Gross per 24 hour   Intake 240 ml   Output --   Net 240 ml       Physical Exam Performed:    BP (!) 151/108   Pulse (!) 114   Temp 98.6 °F (37 °C)   Resp 27   Ht 5' 4\" (1.626 m)   Wt 132 lb 4.4 oz (60 kg)   SpO2 100%   BMI 22.71 kg/m²     General appearance: No apparent distress, appears stated age and cooperative. HEENT: Pupils equal, round, and reactive to light. Conjunctivae/corneas clear. Neck: Supple, with full range of motion. No jugular venous distention. Trachea midline. Respiratory:  Normal respiratory effort. Clear to auscultation, bilaterally without Rales/Wheezes/Rhonchi. Cardiovascular: Regular rate and rhythm with normal S1/S2 without murmurs, rubs or gallops. Abdomen: Soft, non-tender, non-distended with normal bowel sounds. Musculoskeletal: No clubbing, cyanosis or edema bilaterally.   Full range of motion without deformity. Skin: Skin color, texture, turgor normal.  No rashes or lesions. Neurologic:  Neurovascularly intact without any focal sensory/motor deficits. Cranial nerves: II-XII intact, grossly non-focal.  Psychiatric: Alert and oriented, thought content appropriate, normal insight  Capillary Refill: Brisk, 3 seconds, normal   Peripheral Pulses: +2 palpable, equal bilaterally       Labs:   Recent Labs     02/17/23  1440 02/18/23  0523   WBC 14.5* 13.7*   HGB 13.5 10.9*   HCT 38.8 33.9*    150     Recent Labs     02/17/23  1440 02/18/23  0523   * 135*   K 3.9 4.2   CL 99 104   CO2 18* 17*   BUN 35* 31*   CREATININE 2.5* 1.9*   CALCIUM 8.6 7.3*     Recent Labs     02/17/23  1440   AST 18   ALT 12   BILITOT 0.3   ALKPHOS 115     No results for input(s): INR in the last 72 hours. Recent Labs     02/17/23  1440   TROPONINI <0.01       Urinalysis:      Lab Results   Component Value Date/Time    NITRU POSITIVE 02/17/2023 03:00 PM    WBCUA  02/17/2023 03:00 PM    BACTERIA 2+ 02/17/2023 03:00 PM    RBCUA  02/17/2023 03:00 PM    BLOODU LARGE 02/17/2023 03:00 PM    SPECGRAV 1.020 02/17/2023 03:00 PM    GLUCOSEU Negative 02/17/2023 03:00 PM       Radiology:  CT ABDOMEN PELVIS WO CONTRAST Additional Contrast? None   Final Result   1. Left renal cortical thinning and adjacent perinephric fat stranding with   no hydronephrosis or evident renal stone. The findings are suspicious for   pyelonephritis. Correlation with urinalysis is suggested. 2. No acute findings elsewhere in the abdomen or pelvis. 3. Age advanced atherosclerosis of the abdominal aorta and its branches. 4. Hepatomegaly. 5. Grade 1 anterolisthesis of L5 on S1 related to bilateral L5 spondylolysis   and advanced degenerative disc disease at L5-S1. XR CHEST PORTABLE   Final Result   No radiographic evidence of an acute cardiopulmonary process.              None    Assessment/Plan:    Active Hospital Problems Diagnosis     Sepsis ([facility] [address] [A41.9]      Priority: Medium    Pyelonephritis [N12]      Priority: Medium    Acute kidney injury ([facility] [address] [N17.9]      Priority: Medium     Sepsis ([facility] [address] on admission due to Acute Pyelonephritis  - with Tachycardia,  Neutrophilic Leukocytosis and infection  -  Initial Lactic Acid 2.4 > 1.7 and will trend   - Pt was resuscitated with 30 cc/Kg IV Fluids and blood pressure will be monitored closely  - continue IV fluids  - No previous culture available to review  - Ct abd/pelv: Left renal cortical thinning and adjacent perinephric fat stranding with no hydronephrosis or evident renal stone. The findings are suspicious for pyelonephritis. Plan   - Urine and blood cultures pending  - Start abx therapy with Cefepime  - Hydrate with IVF     Acute kidney injury -   the etiology is unclear but considerations include dehydration, hypotension, nephrotoxic medications, and ATN  - crt baseline 0.8,  today 2.5--> 1.9  - IVF  - Avoid nephrotoxins  - check: UACS, Sander/UCrt, uric acid, TSH, U osmol  - follow renal function tests; if no improvement will get renal US     Uncontrolled pain. Continue prn oxycodone      COPD (chronic obstructive pulmonary disease)   - without acute exacerbation.   - Nebulizer treatments as needed and continue home medication  - Patient will be monitored closely, and deep breathing and coughing will be encouraged while awake. HTN  - monitor blood pressure  - currently hypotensive will hold home meds     Anxiety  - supportive therapy  - resume       DVT Prophylaxis: lovenox  Diet: ADULT DIET; Regular  Code Status: Full Code  PT/OT Eval Status: baseline .      Dispo - pending clinical improvement      Jt Steven MD

## 2023-02-18 NOTE — H&P
Hospital Medicine History & Physical      PCP: No primary care provider on file. Date of Admission: 2/17/2023    Date of Service: Pt seen/examined on 2/17/2023 and Admitted to Inpatient with expected LOS greater than two midnights due to medical therapy. Chief Complaint:  Fever, abdominal pain      History Of Present Illness:      51 y.o. female with PMHx of Anxiety, COPD, HTN and substance abuse presented to [facility] in transfer from [facility] for management of sepsis with pyelonephritis. Pt presented to [facility] ED with fever and lower abdominal pain present for several day.  + nausea and vomiting yesterday. No urinary symptoms. + Suprapubic abdominal pain    Past Medical History:          Diagnosis Date    Anxiety     Arthritis     h/o C6 fx, bulging disc on back    COPD (chronic obstructive pulmonary disease) ([facility])     [place], surgical     High blood pressure     Neck fracture ([facility])     Stroke ([facility])     right facial droop sl drool    Substance abuse ([facility] [address] 10/11/2013       Past Surgical History:          Procedure Laterality Date    ABDOMEN SURGERY      exploratory    ANTERIOR CRUCIATE LIGAMENT REPAIR Left 9/13/2019    LEFT KNEE ARTHROSCOPY, ANTERIOR CRUCIATE LIGAMENT RECONSTRUCTION WITH ACHILLES ALLOGRAFT; MEDIAL MENISCECTOMY REPAIR performed by Claudio Flower MD at [address] removed from face as a 8th grader       Medications Prior to Admission:      Prior to Admission medications    Medication Sig Start Date End Date Taking? Authorizing Provider   ibuprofen (ADVIL;MOTRIN) 800 MG tablet Take 1 tablet by mouth 3 times daily (with meals) 11/22/22   Julianne Levy MD       Allergies:  Tramadol    Social History:      The patient currently lives home with family    TOBACCO:   reports that she has been smoking cigarettes. She has a 20.00 pack-year smoking history.  She has never used smokeless tobacco.  ETOH:   reports no history of alcohol use. Family History:      Reviewed in detail positive as follows:        Problem Relation Age of Onset    Heart Failure Mother         pace maker    COPD Mother     COPD Father     Heart Attack Father     Heart Disease Father         stents/bypass    Cancer Maternal Aunt         breast/lung    Arthritis Other     Diabetes Other     High Blood Pressure Other     Stroke Sister 27    Lupus Sister     Heart Disease Brother     Heart Attack Brother     COPD Maternal Grandmother     No Known Problems Maternal Grandfather     No Known Problems Paternal Grandmother     No Known Problems Paternal Grandfather     Heart Disease Sister     Breast Cancer Maternal Aunt        REVIEW OF SYSTEMS:   Pertinent positives as noted in the HPI. All other systems reviewed and negative. PHYSICAL EXAM PERFORMED:    BP 98/79   Pulse (!) 150   Temp 98.5 °F (36.9 °C) (Oral)   Resp 19   Ht 5' 4\" (1.626 m)   Wt 130 lb 1.1 oz (59 kg)   SpO2 96%   BMI 22.33 kg/m²     General appearance:  Well developed, well nourished,  female lying in hospital bed in no apparent distress, appears stated age and cooperative. HEENT:  Normal cephalic, atraumatic without obvious deformity. Pupils equal, round, and reactive to light. Conjunctivae/corneas clear. Neck: Supple, with full range of motion. No jugular venous distention. Trachea midline. Respiratory:  Normal respiratory effort. Clear to auscultation, bilaterally without accessory muscle use. Cardiovascular:  Regular rate and rhythm without murmurs, no lower extremity edema. Abdomen: Soft, non-tender, non-distended, without rebound or guarding. Normal bowel sounds. Musculoskeletal:  Moves all extremities equally. Full range of motion without deformity. Skin: Skin warm, dry and intact. No rashes or lesions. Neurologic:  Neurovascularly intact without any focal sensory/motor deficits.  Cranial nerves: II-XII intact, grossly non-focal.  Psychiatric:  Alert and oriented, irritable during exam, normal insight  Capillary Refill: Brisk,< 3 seconds   Peripheral Pulses: +2 palpable, equal bilaterally       Labs:     Recent Labs     02/17/23  1440   WBC 14.5*   HGB 13.5   HCT 38.8        Recent Labs     02/17/23  1440   *   K 3.9   CL 99   CO2 18*   BUN 35*   CREATININE 2.5*   CALCIUM 8.6     Recent Labs     02/17/23  1440   AST 18   ALT 12   BILITOT 0.3   ALKPHOS 115     No results for input(s): INR in the last 72 hours. Recent Labs     02/17/23  1440   TROPONINI <0.01       Urinalysis:      Lab Results   Component Value Date/Time    NITRU POSITIVE 02/17/2023 03:00 PM    WBCUA  02/17/2023 03:00 PM    BACTERIA 2+ 02/17/2023 03:00 PM    RBCUA  02/17/2023 03:00 PM    BLOODU LARGE 02/17/2023 03:00 PM    SPECGRAV 1.020 02/17/2023 03:00 PM    GLUCOSEU Negative 02/17/2023 03:00 PM       Radiology:     CT ABDOMEN PELVIS WO CONTRAST Additional Contrast? None   Final Result   1. Left renal cortical thinning and adjacent perinephric fat stranding with   no hydronephrosis or evident renal stone. The findings are suspicious for   pyelonephritis. Correlation with urinalysis is suggested. 2. No acute findings elsewhere in the abdomen or pelvis. 3. Age advanced atherosclerosis of the abdominal aorta and its branches. 4. Hepatomegaly. 5. Grade 1 anterolisthesis of L5 on S1 related to bilateral L5 spondylolysis   and advanced degenerative disc disease at L5-S1. XR CHEST PORTABLE   Final Result   No radiographic evidence of an acute cardiopulmonary process.              ASSESSMENT:    Active Hospital Problems    Diagnosis Date Noted    Sepsis ([address] [A41.9] 02/17/2023     Priority: Medium    Pyelonephritis [N12] 02/17/2023     Priority: Medium    Acute kidney injury [facility]) [N17.9] 02/17/2023     Priority: Medium         PLAN:    Sepsis ([address] on admission due to Acute Pyelonephritis  - with Tachycardia,  Neutrophilic Leukocytosis and infection  -  Initial Lactic Acid 2.4 > 1.7 and will trend   - Pt was resuscitated with 30 cc/Kg IV Fluids and blood pressure will be monitored closely  - continue IV fluids  - No previous culture available to review  - Ct abd/pelv: Left renal cortical thinning and adjacent perinephric fat stranding with no hydronephrosis or evident renal stone. The findings are suspicious for pyelonephritis. - Urine and blood cultures pending  - Start abx therapy with Cefepime  - Hydrate with IVF    Acute kidney injury - the etiology is unclear but considerations include dehydration, hypotension, nephrotoxic medications, and ATN  - crt baseline 0.8,  today 2.5  - IVF  - Avoid nephrotoxins  - check: UACS, Sander/UCrt, uric acid, TSH, U osmol  - follow renal function tests; if no improvement will get renal US     COPD (chronic obstructive pulmonary disease)   - without acute exacerbation.   - Nebulizer treatments as needed and continue home medication  - Patient will be monitored closely, and deep breathing and coughing will be encouraged while awake. HTN  - monitor blood pressure  - currently hypotensive will hold home meds    Anxiety  - supportive therapy  - resume    DVT Prophylaxis: Lovenox  Diet: ADULT DIET; Regular  Code Status: Full Code    Dispo - Inpatient, PCU       [address], APRN - CNP    Thank you No primary care provider on file. for the opportunity to be involved in this patient's care. If you have any questions or concerns please feel free to contact me at [phone].

## 2023-02-18 NOTE — PROGRESS NOTES
Patient admitted to room 5262. A/O x4. Complaining of abdominal pain. No complaints of nausea. Heart rate elevated.

## 2023-02-18 NOTE — PLAN OF CARE
Problem: Discharge Planning  Goal: Discharge to home or other facility with appropriate resources  Outcome: Progressing     Problem: Cardiovascular - Adult  Goal: Maintains optimal cardiac output and hemodynamic stability  Outcome: Progressing     Problem: Gastrointestinal - Adult  Goal: Minimal or absence of nausea and vomiting  Outcome: Progressing     Problem: Infection - Adult  Goal: Absence of infection at discharge  Outcome: Progressing     Problem: Infection - Adult  Goal: Absence of fever/infection during anticipated neutropenic period  Outcome: Progressing

## 2023-02-18 NOTE — PROGRESS NOTES
4 Eyes Skin Assessment     NAME:  Delilah Gurrola  YOB: 1975  MEDICAL RECORD NUMBER:  [id number]    The patient is being assessed for  Admission    I agree that One RN has performed a thorough Head to Toe Skin Assessment on the patient. ALL assessment sites listed below have been assessed. Areas assessed by both nurses:    Head, Face, Ears, Shoulders, Back, Chest, Arms, Elbows, Hands, Sacrum. Buttock, Coccyx, Ischium, and Legs. Feet and Heels        Does the Patient have a Wound?  No noted wound(s)       Maxim Prevention initiated by RN: No   Wound Care Orders initiated by RN: No    Pressure Injury (Stage 3,4, Unstageable, DTI, NWPT, and Complex wounds) if present, place referral order by RN under : NA    New and Established Ostomies, if present place, referral order under : NA      Nurse 1 eSignature: Electronically signed by Codie Nunez RN on 2/18/23 at 2:46 AM EST    **SHARE this note so that the co-signing nurse can place an eSignature**    Nurse 2 eSignature: Electronically signed by Jose Mckinney RN on 2/18/23 at 6:00 AM EST

## 2023-02-19 LAB
ANION GAP SERPL CALCULATED.3IONS-SCNC: 12 MMOL/L (ref 3–16)
BASOPHILS ABSOLUTE: 0 K/UL (ref 0–0.2)
BASOPHILS RELATIVE PERCENT: 0.1 %
BUN BLDV-MCNC: 20 MG/DL (ref 7–20)
CALCIUM SERPL-MCNC: 7.3 MG/DL (ref 8.3–10.6)
CHLORIDE BLD-SCNC: 105 MMOL/L (ref 99–110)
CO2: 16 MMOL/L (ref 21–32)
CREAT SERPL-MCNC: 1.5 MG/DL (ref 0.6–1.1)
EOSINOPHILS ABSOLUTE: 0.2 K/UL (ref 0–0.6)
EOSINOPHILS RELATIVE PERCENT: 1.2 %
GFR SERPL CREATININE-BSD FRML MDRD: 42 ML/MIN/{1.73_M2}
GLUCOSE BLD-MCNC: 66 MG/DL (ref 70–99)
HCT VFR BLD CALC: 29.9 % (ref 36–48)
HEMOGLOBIN: 10.1 G/DL (ref 12–16)
LACTIC ACID, SEPSIS: 0.8 MMOL/L (ref 0.4–1.9)
LACTIC ACID, SEPSIS: 0.8 MMOL/L (ref 0.4–1.9)
LACTIC ACID, SEPSIS: 0.9 MMOL/L (ref 0.4–1.9)
LACTIC ACID, SEPSIS: 1.2 MMOL/L (ref 0.4–1.9)
LYMPHOCYTES ABSOLUTE: 0.4 K/UL (ref 1–5.1)
LYMPHOCYTES RELATIVE PERCENT: 2.7 %
MCH RBC QN AUTO: 30.9 PG (ref 26–34)
MCHC RBC AUTO-ENTMCNC: 33.7 G/DL (ref 31–36)
MCV RBC AUTO: 91.6 FL (ref 80–100)
MONOCYTES ABSOLUTE: 0.8 K/UL (ref 0–1.3)
MONOCYTES RELATIVE PERCENT: 6 %
NEUTROPHILS ABSOLUTE: 11.6 K/UL (ref 1.7–7.7)
NEUTROPHILS RELATIVE PERCENT: 90 %
PDW BLD-RTO: 14.4 % (ref 12.4–15.4)
PLATELET # BLD: 144 K/UL (ref 135–450)
PMV BLD AUTO: 8.8 FL (ref 5–10.5)
POTASSIUM SERPL-SCNC: 3.6 MMOL/L (ref 3.5–5.1)
RBC # BLD: 3.26 M/UL (ref 4–5.2)
SODIUM BLD-SCNC: 133 MMOL/L (ref 136–145)
WBC # BLD: 12.8 K/UL (ref 4–11)

## 2023-02-19 PROCEDURE — 2580000003 HC RX 258: Performed by: INTERNAL MEDICINE

## 2023-02-19 PROCEDURE — 6370000000 HC RX 637 (ALT 250 FOR IP): Performed by: NURSE PRACTITIONER

## 2023-02-19 PROCEDURE — 6370000000 HC RX 637 (ALT 250 FOR IP): Performed by: STUDENT IN AN ORGANIZED HEALTH CARE EDUCATION/TRAINING PROGRAM

## 2023-02-19 PROCEDURE — 6370000000 HC RX 637 (ALT 250 FOR IP): Performed by: INTERNAL MEDICINE

## 2023-02-19 PROCEDURE — 2060000000 HC ICU INTERMEDIATE R&B

## 2023-02-19 PROCEDURE — 6360000002 HC RX W HCPCS: Performed by: STUDENT IN AN ORGANIZED HEALTH CARE EDUCATION/TRAINING PROGRAM

## 2023-02-19 PROCEDURE — 6360000002 HC RX W HCPCS: Performed by: INTERNAL MEDICINE

## 2023-02-19 PROCEDURE — 83605 ASSAY OF LACTIC ACID: CPT

## 2023-02-19 PROCEDURE — 36415 COLL VENOUS BLD VENIPUNCTURE: CPT

## 2023-02-19 PROCEDURE — 85025 COMPLETE CBC W/AUTO DIFF WBC: CPT

## 2023-02-19 PROCEDURE — 80048 BASIC METABOLIC PNL TOTAL CA: CPT

## 2023-02-19 RX ORDER — PROCHLORPERAZINE EDISYLATE 5 MG/ML
10 INJECTION INTRAMUSCULAR; INTRAVENOUS ONCE
Status: COMPLETED | OUTPATIENT
Start: 2023-02-19 | End: 2023-02-19

## 2023-02-19 RX ORDER — PROCHLORPERAZINE EDISYLATE 5 MG/ML
10 INJECTION INTRAMUSCULAR; INTRAVENOUS EVERY 6 HOURS PRN
Status: DISCONTINUED | OUTPATIENT
Start: 2023-02-19 | End: 2023-02-19

## 2023-02-19 RX ORDER — ENOXAPARIN SODIUM 100 MG/ML
40 INJECTION SUBCUTANEOUS DAILY
Status: DISCONTINUED | OUTPATIENT
Start: 2023-02-20 | End: 2023-02-26 | Stop reason: HOSPADM

## 2023-02-19 RX ORDER — HYDRALAZINE HYDROCHLORIDE 20 MG/ML
5 INJECTION INTRAMUSCULAR; INTRAVENOUS EVERY 6 HOURS PRN
Status: DISCONTINUED | OUTPATIENT
Start: 2023-02-19 | End: 2023-02-26 | Stop reason: HOSPADM

## 2023-02-19 RX ORDER — SCOLOPAMINE TRANSDERMAL SYSTEM 1 MG/1
1 PATCH, EXTENDED RELEASE TRANSDERMAL
Status: DISCONTINUED | OUTPATIENT
Start: 2023-02-19 | End: 2023-02-26 | Stop reason: HOSPADM

## 2023-02-19 RX ORDER — CALCIUM CARBONATE 200(500)MG
500 TABLET,CHEWABLE ORAL 3 TIMES DAILY PRN
Status: DISCONTINUED | OUTPATIENT
Start: 2023-02-19 | End: 2023-02-26 | Stop reason: HOSPADM

## 2023-02-19 RX ADMIN — MORPHINE SULFATE 4 MG: 4 INJECTION, SOLUTION INTRAMUSCULAR; INTRAVENOUS at 21:35

## 2023-02-19 RX ADMIN — MORPHINE SULFATE 4 MG: 4 INJECTION, SOLUTION INTRAMUSCULAR; INTRAVENOUS at 17:28

## 2023-02-19 RX ADMIN — ACETAMINOPHEN 325MG 325 MG: 325 TABLET ORAL at 04:32

## 2023-02-19 RX ADMIN — CEFEPIME 1000 MG: 1 INJECTION, POWDER, FOR SOLUTION INTRAMUSCULAR; INTRAVENOUS at 08:36

## 2023-02-19 RX ADMIN — ONDANSETRON 4 MG: 2 INJECTION INTRAMUSCULAR; INTRAVENOUS at 17:27

## 2023-02-19 RX ADMIN — PROCHLORPERAZINE EDISYLATE 10 MG: 5 INJECTION INTRAMUSCULAR; INTRAVENOUS at 05:49

## 2023-02-19 RX ADMIN — SODIUM CHLORIDE: 9 INJECTION, SOLUTION INTRAVENOUS at 10:33

## 2023-02-19 RX ADMIN — SODIUM CHLORIDE: 9 INJECTION, SOLUTION INTRAVENOUS at 16:32

## 2023-02-19 RX ADMIN — ANTACID TABLETS 500 MG: 500 TABLET, CHEWABLE ORAL at 18:34

## 2023-02-19 RX ADMIN — CEFEPIME 1000 MG: 1 INJECTION, POWDER, FOR SOLUTION INTRAMUSCULAR; INTRAVENOUS at 20:37

## 2023-02-19 RX ADMIN — ACETAMINOPHEN 325MG 650 MG: 325 TABLET ORAL at 14:19

## 2023-02-19 RX ADMIN — SODIUM CHLORIDE: 9 INJECTION, SOLUTION INTRAVENOUS at 23:22

## 2023-02-19 RX ADMIN — MORPHINE SULFATE 4 MG: 4 INJECTION, SOLUTION INTRAMUSCULAR; INTRAVENOUS at 11:29

## 2023-02-19 RX ADMIN — OXYCODONE HYDROCHLORIDE 10 MG: 10 TABLET ORAL at 20:37

## 2023-02-19 RX ADMIN — SODIUM CHLORIDE: 9 INJECTION, SOLUTION INTRAVENOUS at 04:34

## 2023-02-19 RX ADMIN — ONDANSETRON 4 MG: 2 INJECTION INTRAMUSCULAR; INTRAVENOUS at 11:29

## 2023-02-19 ASSESSMENT — PAIN SCALES - GENERAL
PAINLEVEL_OUTOF10: 6
PAINLEVEL_OUTOF10: 5
PAINLEVEL_OUTOF10: 5
PAINLEVEL_OUTOF10: 7
PAINLEVEL_OUTOF10: 7
PAINLEVEL_OUTOF10: 5
PAINLEVEL_OUTOF10: 7

## 2023-02-19 ASSESSMENT — PAIN DESCRIPTION - ORIENTATION
ORIENTATION: LEFT;MID
ORIENTATION: UPPER
ORIENTATION: LEFT;MID

## 2023-02-19 ASSESSMENT — PAIN DESCRIPTION - DESCRIPTORS
DESCRIPTORS: ACHING
DESCRIPTORS: DISCOMFORT
DESCRIPTORS: GNAWING
DESCRIPTORS: ACHING;CRAMPING
DESCRIPTORS: DISCOMFORT

## 2023-02-19 ASSESSMENT — PAIN DESCRIPTION - LOCATION
LOCATION: ABDOMEN

## 2023-02-19 ASSESSMENT — PAIN - FUNCTIONAL ASSESSMENT
PAIN_FUNCTIONAL_ASSESSMENT: ACTIVITIES ARE NOT PREVENTED
PAIN_FUNCTIONAL_ASSESSMENT: ACTIVITIES ARE NOT PREVENTED

## 2023-02-19 NOTE — PROGRESS NOTES
Pt called complaining of shaking. Temp 101. 6. HR got up to 150, down to 100s now. Pt given PRN tylenol.

## 2023-02-19 NOTE — PROGRESS NOTES
Hospitalist Progress Note      PCP: No primary care provider on file. Date of Admission: 2/17/2023    Chief Complaint: fever, abdominal pain. Hospital Course:    51 y.o. female with PMHx of Anxiety, COPD, HTN and substance abuse presented to [facility] in transfer from [facility] for management of sepsis with pyelonephritis. Pt presented to [place] ED with fever and lower abdominal pain present for several day.  + nausea and vomiting yesterday. No urinary symptoms. + Suprapubic abdominal pain    Subjective:   Patient reported improved pain. Continues to be tachycardic      Medications:  Reviewed    Infusion Medications    sodium chloride      sodium chloride 150 mL/hr at 02/19/23 1033     Scheduled Medications    [START ON 2/20/2023] enoxaparin  40 mg SubCUTAneous Daily    sodium chloride flush  5-40 mL IntraVENous 2 times per day    cefepime  1,000 mg IntraVENous Q12H    nicotine  1 patch TransDERmal Daily     PRN Meds: hydrALAZINE, oxyCODONE, morphine, ondansetron, sodium chloride flush, sodium chloride, acetaminophen **OR** acetaminophen      Intake/Output Summary (Last 24 hours) at 2/19/2023 1316  Last data filed at 2/19/2023 0944  Gross per 24 hour   Intake 1200 ml   Output 30 ml   Net 1170 ml         Physical Exam Performed:    BP (!) 150/103   Pulse (!) 101   Temp 99.1 °F (37.3 °C) (Oral)   Resp 18   Ht 5' 4\" (1.626 m)   Wt 136 lb 14.4 oz (62.1 kg)   SpO2 98%   BMI 23.50 kg/m²     General appearance: No apparent distress, appears stated age and cooperative. HEENT: Pupils equal, round, and reactive to light. Conjunctivae/corneas clear. Neck: Supple, with full range of motion. No jugular venous distention. Trachea midline. Respiratory:  Normal respiratory effort. Clear to auscultation, bilaterally without Rales/Wheezes/Rhonchi. Cardiovascular: Regular rate and rhythm with normal S1/S2 without murmurs, rubs or gallops.   Abdomen: Soft, non-tender, non-distended with normal bowel sounds. Musculoskeletal: No clubbing, cyanosis or edema bilaterally. Full range of motion without deformity. Skin: Skin color, texture, turgor normal.  No rashes or lesions. Neurologic:  Neurovascularly intact without any focal sensory/motor deficits. Cranial nerves: II-XII intact, grossly non-focal.  Psychiatric: Alert and oriented, thought content appropriate, normal insight  Capillary Refill: Brisk, 3 seconds, normal   Peripheral Pulses: +2 palpable, equal bilaterally       Labs:   Recent Labs     02/17/23  1440 02/18/23  0523 02/19/23  0535   WBC 14.5* 13.7* 12.8*   HGB 13.5 10.9* 10.1*   HCT 38.8 33.9* 29.9*    150 144       Recent Labs     02/17/23  1440 02/18/23  0523 02/19/23  0535   * 135* 133*   K 3.9 4.2 3.6   CL 99 104 105   CO2 18* 17* 16*   BUN 35* 31* 20   CREATININE 2.5* 1.9* 1.5*   CALCIUM 8.6 7.3* 7.3*       Recent Labs     02/17/23  1440   AST 18   ALT 12   BILITOT 0.3   ALKPHOS 115       No results for input(s): INR in the last 72 hours. Recent Labs     02/17/23  1440   TROPONINI <0.01         Urinalysis:      Lab Results   Component Value Date/Time    NITRU POSITIVE 02/17/2023 03:00 PM    WBCUA  02/17/2023 03:00 PM    BACTERIA 2+ 02/17/2023 03:00 PM    RBCUA  02/17/2023 03:00 PM    BLOODU LARGE 02/17/2023 03:00 PM    SPECGRAV 1.020 02/17/2023 03:00 PM    GLUCOSEU Negative 02/17/2023 03:00 PM       Radiology:  CT ABDOMEN PELVIS WO CONTRAST Additional Contrast? None   Final Result   1. Left renal cortical thinning and adjacent perinephric fat stranding with   no hydronephrosis or evident renal stone. The findings are suspicious for   pyelonephritis. Correlation with urinalysis is suggested. 2. No acute findings elsewhere in the abdomen or pelvis. 3. Age advanced atherosclerosis of the abdominal aorta and its branches. 4. Hepatomegaly.    5. Grade 1 anterolisthesis of L5 on S1 related to bilateral L5 spondylolysis   and advanced degenerative disc disease at L5-S1. XR CHEST PORTABLE   Final Result   No radiographic evidence of an acute cardiopulmonary process. None    Assessment/Plan:    Active Hospital Problems    Diagnosis     Sepsis ([facility] [address] [A41.9]      Priority: Medium    Pyelonephritis [N12]      Priority: Medium    Acute kidney injury ([facility] [address] [N17.9]      Priority: Medium     Sepsis ([facility] [address] on admission due to Acute Pyelonephritis  - with Tachycardia,  Neutrophilic Leukocytosis and infection  -  Initial Lactic Acid 2.4 > 1.7 and will trend   - Pt was resuscitated with 30 cc/Kg IV Fluids and blood pressure will be monitored closely  - continue IV fluids  - No previous culture available to review  - Ct abd/pelv: Left renal cortical thinning and adjacent perinephric fat stranding with no hydronephrosis or evident renal stone. The findings are suspicious for pyelonephritis. Plan   - Urine and blood cultures pending (urine culture growing E. coli, sensitivity pending)   -Continue on cefepime  - Hydrate with IVF     Acute kidney injury -   the etiology is unclear but considerations include dehydration, hypotension, nephrotoxic medications, and ATN  - crt baseline 0.8,  today 2.5--> 1.9--> 1.5  - IVF  - Avoid nephrotoxins  - check: UACS, Sander/UCrt, uric acid, TSH, U osmol  - follow renal function tests; if no improvement will get renal US     Uncontrolled pain. Continue prn oxycodone  ( dose increased yesterday )     COPD (chronic obstructive pulmonary disease)   - without acute exacerbation.   - Nebulizer treatments as needed and continue home medication  - Patient will be monitored closely, and deep breathing and coughing will be encouraged while awake. HTN  - monitor blood pressure  - currently hypotensive will hold home meds     Anxiety  - supportive therapy  - resume       DVT Prophylaxis: lovenox  Diet: ADULT DIET; Regular  Code Status: Full Code  PT/OT Eval Status: baseline .      Dispo - pending clinical improvement      Misty Logan MD

## 2023-02-20 LAB
ANION GAP SERPL CALCULATED.3IONS-SCNC: 13 MMOL/L (ref 3–16)
BASOPHILS ABSOLUTE: 0 K/UL (ref 0–0.2)
BASOPHILS RELATIVE PERCENT: 0.1 %
BUN BLDV-MCNC: 17 MG/DL (ref 7–20)
CALCIUM SERPL-MCNC: 8 MG/DL (ref 8.3–10.6)
CHLORIDE BLD-SCNC: 107 MMOL/L (ref 99–110)
CO2: 17 MMOL/L (ref 21–32)
CREAT SERPL-MCNC: 1.5 MG/DL (ref 0.6–1.1)
EOSINOPHILS ABSOLUTE: 0.2 K/UL (ref 0–0.6)
EOSINOPHILS RELATIVE PERCENT: 1.5 %
GFR SERPL CREATININE-BSD FRML MDRD: 42 ML/MIN/{1.73_M2}
GLUCOSE BLD-MCNC: 70 MG/DL (ref 70–99)
HCT VFR BLD CALC: 33 % (ref 36–48)
HEMOGLOBIN: 10.9 G/DL (ref 12–16)
LACTIC ACID, SEPSIS: 1.1 MMOL/L (ref 0.4–1.9)
LYMPHOCYTES ABSOLUTE: 0.6 K/UL (ref 1–5.1)
LYMPHOCYTES RELATIVE PERCENT: 5.2 %
MCH RBC QN AUTO: 30.3 PG (ref 26–34)
MCHC RBC AUTO-ENTMCNC: 32.9 G/DL (ref 31–36)
MCV RBC AUTO: 92 FL (ref 80–100)
MONOCYTES ABSOLUTE: 1.1 K/UL (ref 0–1.3)
MONOCYTES RELATIVE PERCENT: 9.3 %
NEUTROPHILS ABSOLUTE: 10 K/UL (ref 1.7–7.7)
NEUTROPHILS RELATIVE PERCENT: 83.9 %
ORGANISM: ABNORMAL
PDW BLD-RTO: 15.1 % (ref 12.4–15.4)
PLATELET # BLD: 160 K/UL (ref 135–450)
PMV BLD AUTO: 8.3 FL (ref 5–10.5)
POTASSIUM SERPL-SCNC: 3.8 MMOL/L (ref 3.5–5.1)
PROCALCITONIN: 8.68 NG/ML (ref 0–0.15)
RBC # BLD: 3.59 M/UL (ref 4–5.2)
SODIUM BLD-SCNC: 137 MMOL/L (ref 136–145)
URINE CULTURE, ROUTINE: ABNORMAL
WBC # BLD: 12 K/UL (ref 4–11)

## 2023-02-20 PROCEDURE — 2580000003 HC RX 258: Performed by: INTERNAL MEDICINE

## 2023-02-20 PROCEDURE — 36415 COLL VENOUS BLD VENIPUNCTURE: CPT

## 2023-02-20 PROCEDURE — 94760 N-INVAS EAR/PLS OXIMETRY 1: CPT

## 2023-02-20 PROCEDURE — 6370000000 HC RX 637 (ALT 250 FOR IP): Performed by: INTERNAL MEDICINE

## 2023-02-20 PROCEDURE — 85025 COMPLETE CBC W/AUTO DIFF WBC: CPT

## 2023-02-20 PROCEDURE — 6370000000 HC RX 637 (ALT 250 FOR IP): Performed by: STUDENT IN AN ORGANIZED HEALTH CARE EDUCATION/TRAINING PROGRAM

## 2023-02-20 PROCEDURE — 2060000000 HC ICU INTERMEDIATE R&B

## 2023-02-20 PROCEDURE — 80048 BASIC METABOLIC PNL TOTAL CA: CPT

## 2023-02-20 PROCEDURE — 84145 PROCALCITONIN (PCT): CPT

## 2023-02-20 PROCEDURE — 6370000000 HC RX 637 (ALT 250 FOR IP): Performed by: NURSE PRACTITIONER

## 2023-02-20 PROCEDURE — 6360000002 HC RX W HCPCS: Performed by: INTERNAL MEDICINE

## 2023-02-20 PROCEDURE — 6360000002 HC RX W HCPCS: Performed by: STUDENT IN AN ORGANIZED HEALTH CARE EDUCATION/TRAINING PROGRAM

## 2023-02-20 PROCEDURE — 83605 ASSAY OF LACTIC ACID: CPT

## 2023-02-20 RX ORDER — CLONIDINE HYDROCHLORIDE 0.1 MG/1
0.1 TABLET ORAL ONCE
Status: COMPLETED | OUTPATIENT
Start: 2023-02-20 | End: 2023-02-20

## 2023-02-20 RX ORDER — PANTOPRAZOLE SODIUM 40 MG/1
40 TABLET, DELAYED RELEASE ORAL
Status: DISCONTINUED | OUTPATIENT
Start: 2023-02-21 | End: 2023-02-26 | Stop reason: HOSPADM

## 2023-02-20 RX ADMIN — SODIUM CHLORIDE: 9 INJECTION, SOLUTION INTRAVENOUS at 19:26

## 2023-02-20 RX ADMIN — ACETAMINOPHEN 325MG 325 MG: 325 TABLET ORAL at 20:05

## 2023-02-20 RX ADMIN — ACETAMINOPHEN 325MG 650 MG: 325 TABLET ORAL at 12:31

## 2023-02-20 RX ADMIN — SODIUM CHLORIDE: 9 INJECTION, SOLUTION INTRAVENOUS at 12:37

## 2023-02-20 RX ADMIN — ACETAMINOPHEN 325MG 650 MG: 325 TABLET ORAL at 07:41

## 2023-02-20 RX ADMIN — CEFEPIME 1000 MG: 1 INJECTION, POWDER, FOR SOLUTION INTRAMUSCULAR; INTRAVENOUS at 20:08

## 2023-02-20 RX ADMIN — OXYCODONE HYDROCHLORIDE 10 MG: 10 TABLET ORAL at 19:21

## 2023-02-20 RX ADMIN — ANTACID TABLETS 500 MG: 500 TABLET, CHEWABLE ORAL at 12:30

## 2023-02-20 RX ADMIN — MORPHINE SULFATE 4 MG: 4 INJECTION, SOLUTION INTRAMUSCULAR; INTRAVENOUS at 14:40

## 2023-02-20 RX ADMIN — SODIUM CHLORIDE, PRESERVATIVE FREE 10 ML: 5 INJECTION INTRAVENOUS at 11:15

## 2023-02-20 RX ADMIN — OXYCODONE HYDROCHLORIDE 10 MG: 10 TABLET ORAL at 11:15

## 2023-02-20 RX ADMIN — ONDANSETRON 4 MG: 2 INJECTION INTRAMUSCULAR; INTRAVENOUS at 19:17

## 2023-02-20 RX ADMIN — SODIUM CHLORIDE: 9 INJECTION, SOLUTION INTRAVENOUS at 05:31

## 2023-02-20 RX ADMIN — CEFEPIME 1000 MG: 1 INJECTION, POWDER, FOR SOLUTION INTRAMUSCULAR; INTRAVENOUS at 07:47

## 2023-02-20 RX ADMIN — ONDANSETRON 4 MG: 2 INJECTION INTRAMUSCULAR; INTRAVENOUS at 12:33

## 2023-02-20 RX ADMIN — CLONIDINE HYDROCHLORIDE 0.1 MG: 0.1 TABLET ORAL at 01:19

## 2023-02-20 RX ADMIN — OXYCODONE HYDROCHLORIDE 10 MG: 10 TABLET ORAL at 00:39

## 2023-02-20 ASSESSMENT — PAIN DESCRIPTION - DESCRIPTORS
DESCRIPTORS: DISCOMFORT
DESCRIPTORS: DISCOMFORT

## 2023-02-20 ASSESSMENT — PAIN SCALES - GENERAL
PAINLEVEL_OUTOF10: 3
PAINLEVEL_OUTOF10: 8
PAINLEVEL_OUTOF10: 5
PAINLEVEL_OUTOF10: 7
PAINLEVEL_OUTOF10: 8
PAINLEVEL_OUTOF10: 6
PAINLEVEL_OUTOF10: 3
PAINLEVEL_OUTOF10: 5

## 2023-02-20 ASSESSMENT — PAIN DESCRIPTION - LOCATION
LOCATION: HEAD
LOCATION: ABDOMEN

## 2023-02-20 ASSESSMENT — PAIN - FUNCTIONAL ASSESSMENT: PAIN_FUNCTIONAL_ASSESSMENT: PREVENTS OR INTERFERES SOME ACTIVE ACTIVITIES AND ADLS

## 2023-02-20 NOTE — PLAN OF CARE
Problem: Discharge Planning  Goal: Discharge to home or other facility with appropriate resources  Outcome: Progressing     Problem: Metabolic/Fluid and Electrolytes - Adult  Goal: Electrolytes maintained within normal limits  Outcome: Progressing

## 2023-02-20 NOTE — CARE COORDINATION
INITIAL CASE MANAGEMENT ASSESSMENT    Reviewed chart, met with patient to assess possible discharge needs. Explained Case Management role/services. Living Situation: Confirmed address, lives in a mobile home alone with 1 step to enter. Currently staying with her friend while her home is under maintenance. ADLs: Independent     DME: None    PT/OT Recs: Not ordered     Active Services: None     Transportation: Patient is an active  but currently without a car, friend transports PRN     Medications: Uses [employer] in [place] -- no barriers    PCP: No PCP - Will give PCP list to patient. HD/PD: n/a    PLAN/COMMENTS: Patient plans on returning home to stay with friend. Denies home needs. Friend can transport home. SW/CM provided contact information for patient or family to call with any questions. SW/CM will follow and assist as needed.     Electronically signed by Tammie Mar RN Case Management [phone] on 2/20/2023 at 2:22 PM

## 2023-02-21 ENCOUNTER — APPOINTMENT (OUTPATIENT)
Dept: CT IMAGING | Age: 48
DRG: 720 | End: 2023-02-21
Payer: COMMERCIAL

## 2023-02-21 LAB
ANION GAP SERPL CALCULATED.3IONS-SCNC: 11 MMOL/L (ref 3–16)
BASOPHILS ABSOLUTE: 0 K/UL (ref 0–0.2)
BASOPHILS RELATIVE PERCENT: 0.3 %
BLOOD CULTURE, ROUTINE: NORMAL
BUN BLDV-MCNC: 15 MG/DL (ref 7–20)
CALCIUM SERPL-MCNC: 7.5 MG/DL (ref 8.3–10.6)
CHLORIDE BLD-SCNC: 108 MMOL/L (ref 99–110)
CO2: 16 MMOL/L (ref 21–32)
CREAT SERPL-MCNC: 1.3 MG/DL (ref 0.6–1.1)
CULTURE, BLOOD 2: NORMAL
EOSINOPHILS ABSOLUTE: 0.1 K/UL (ref 0–0.6)
EOSINOPHILS RELATIVE PERCENT: 0.6 %
GFR SERPL CREATININE-BSD FRML MDRD: 50 ML/MIN/{1.73_M2}
GLUCOSE BLD-MCNC: 74 MG/DL (ref 70–99)
HCT VFR BLD CALC: 31.6 % (ref 36–48)
HEMOGLOBIN: 10.8 G/DL (ref 12–16)
LYMPHOCYTES ABSOLUTE: 0.7 K/UL (ref 1–5.1)
LYMPHOCYTES RELATIVE PERCENT: 6.3 %
MCH RBC QN AUTO: 30.9 PG (ref 26–34)
MCHC RBC AUTO-ENTMCNC: 34 G/DL (ref 31–36)
MCV RBC AUTO: 90.9 FL (ref 80–100)
MONOCYTES ABSOLUTE: 1.2 K/UL (ref 0–1.3)
MONOCYTES RELATIVE PERCENT: 11.4 %
NEUTROPHILS ABSOLUTE: 8.6 K/UL (ref 1.7–7.7)
NEUTROPHILS RELATIVE PERCENT: 81.4 %
PDW BLD-RTO: 14.9 % (ref 12.4–15.4)
PLATELET # BLD: 166 K/UL (ref 135–450)
PMV BLD AUTO: 8.3 FL (ref 5–10.5)
POTASSIUM SERPL-SCNC: 3.4 MMOL/L (ref 3.5–5.1)
RBC # BLD: 3.48 M/UL (ref 4–5.2)
SODIUM BLD-SCNC: 135 MMOL/L (ref 136–145)
WBC # BLD: 10.5 K/UL (ref 4–11)

## 2023-02-21 PROCEDURE — 6370000000 HC RX 637 (ALT 250 FOR IP): Performed by: INTERNAL MEDICINE

## 2023-02-21 PROCEDURE — 6360000002 HC RX W HCPCS: Performed by: STUDENT IN AN ORGANIZED HEALTH CARE EDUCATION/TRAINING PROGRAM

## 2023-02-21 PROCEDURE — 6370000000 HC RX 637 (ALT 250 FOR IP): Performed by: STUDENT IN AN ORGANIZED HEALTH CARE EDUCATION/TRAINING PROGRAM

## 2023-02-21 PROCEDURE — 80048 BASIC METABOLIC PNL TOTAL CA: CPT

## 2023-02-21 PROCEDURE — 6370000000 HC RX 637 (ALT 250 FOR IP): Performed by: NURSE PRACTITIONER

## 2023-02-21 PROCEDURE — 36415 COLL VENOUS BLD VENIPUNCTURE: CPT

## 2023-02-21 PROCEDURE — 74176 CT ABD & PELVIS W/O CONTRAST: CPT

## 2023-02-21 PROCEDURE — 94760 N-INVAS EAR/PLS OXIMETRY 1: CPT

## 2023-02-21 PROCEDURE — 6360000002 HC RX W HCPCS: Performed by: INTERNAL MEDICINE

## 2023-02-21 PROCEDURE — 2580000003 HC RX 258: Performed by: INTERNAL MEDICINE

## 2023-02-21 PROCEDURE — 85025 COMPLETE CBC W/AUTO DIFF WBC: CPT

## 2023-02-21 PROCEDURE — 2060000000 HC ICU INTERMEDIATE R&B

## 2023-02-21 RX ORDER — POTASSIUM CHLORIDE 750 MG/1
40 TABLET, FILM COATED, EXTENDED RELEASE ORAL ONCE
Status: COMPLETED | OUTPATIENT
Start: 2023-02-21 | End: 2023-02-21

## 2023-02-21 RX ORDER — CIPROFLOXACIN 2 MG/ML
400 INJECTION, SOLUTION INTRAVENOUS EVERY 12 HOURS
Status: DISCONTINUED | OUTPATIENT
Start: 2023-02-21 | End: 2023-02-23

## 2023-02-21 RX ADMIN — CEFEPIME 1000 MG: 1 INJECTION, POWDER, FOR SOLUTION INTRAMUSCULAR; INTRAVENOUS at 08:24

## 2023-02-21 RX ADMIN — ACETAMINOPHEN 325MG 650 MG: 325 TABLET ORAL at 16:14

## 2023-02-21 RX ADMIN — OXYCODONE HYDROCHLORIDE 10 MG: 10 TABLET ORAL at 12:44

## 2023-02-21 RX ADMIN — MORPHINE SULFATE 4 MG: 4 INJECTION, SOLUTION INTRAMUSCULAR; INTRAVENOUS at 08:19

## 2023-02-21 RX ADMIN — OXYCODONE HYDROCHLORIDE 10 MG: 10 TABLET ORAL at 00:06

## 2023-02-21 RX ADMIN — ONDANSETRON 4 MG: 2 INJECTION INTRAMUSCULAR; INTRAVENOUS at 16:12

## 2023-02-21 RX ADMIN — SODIUM CHLORIDE: 9 INJECTION, SOLUTION INTRAVENOUS at 08:16

## 2023-02-21 RX ADMIN — ACETAMINOPHEN 325MG 650 MG: 325 TABLET ORAL at 21:50

## 2023-02-21 RX ADMIN — CIPROFLOXACIN 400 MG: 2 INJECTION, SOLUTION INTRAVENOUS at 12:42

## 2023-02-21 RX ADMIN — ACETAMINOPHEN 325MG 650 MG: 325 TABLET ORAL at 06:42

## 2023-02-21 RX ADMIN — SODIUM CHLORIDE: 9 INJECTION, SOLUTION INTRAVENOUS at 19:52

## 2023-02-21 RX ADMIN — ONDANSETRON 4 MG: 2 INJECTION INTRAMUSCULAR; INTRAVENOUS at 08:19

## 2023-02-21 RX ADMIN — POTASSIUM CHLORIDE 40 MEQ: 750 TABLET, FILM COATED, EXTENDED RELEASE ORAL at 08:21

## 2023-02-21 RX ADMIN — PANTOPRAZOLE SODIUM 40 MG: 40 TABLET, DELAYED RELEASE ORAL at 05:47

## 2023-02-21 RX ADMIN — SODIUM CHLORIDE: 9 INJECTION, SOLUTION INTRAVENOUS at 02:15

## 2023-02-21 RX ADMIN — CEFEPIME 1000 MG: 1 INJECTION, POWDER, FOR SOLUTION INTRAMUSCULAR; INTRAVENOUS at 19:50

## 2023-02-21 ASSESSMENT — PAIN DESCRIPTION - ORIENTATION: ORIENTATION: RIGHT;LEFT

## 2023-02-21 ASSESSMENT — PAIN SCALES - GENERAL
PAINLEVEL_OUTOF10: 7
PAINLEVEL_OUTOF10: 6
PAINLEVEL_OUTOF10: 3
PAINLEVEL_OUTOF10: 5
PAINLEVEL_OUTOF10: 7
PAINLEVEL_OUTOF10: 5
PAINLEVEL_OUTOF10: 5
PAINLEVEL_OUTOF10: 3

## 2023-02-21 ASSESSMENT — PAIN DESCRIPTION - LOCATION
LOCATION: ABDOMEN;HEAD
LOCATION: ABDOMEN
LOCATION: HEAD
LOCATION: ABDOMEN
LOCATION: ABDOMEN

## 2023-02-21 ASSESSMENT — PAIN - FUNCTIONAL ASSESSMENT
PAIN_FUNCTIONAL_ASSESSMENT: ACTIVITIES ARE NOT PREVENTED

## 2023-02-21 ASSESSMENT — PAIN DESCRIPTION - DESCRIPTORS
DESCRIPTORS: ACHING;DISCOMFORT;CRAMPING
DESCRIPTORS: DISCOMFORT
DESCRIPTORS: ACHING
DESCRIPTORS: ACHING;DISCOMFORT
DESCRIPTORS: ACHING
DESCRIPTORS: ACHING
DESCRIPTORS: ACHING;DISCOMFORT

## 2023-02-21 ASSESSMENT — PAIN DESCRIPTION - PAIN TYPE: TYPE: ACUTE PAIN

## 2023-02-21 ASSESSMENT — PAIN DESCRIPTION - FREQUENCY: FREQUENCY: CONTINUOUS

## 2023-02-21 ASSESSMENT — PAIN DESCRIPTION - ONSET: ONSET: ON-GOING

## 2023-02-21 NOTE — CONSULTS
Consulting Physician: Dr. Valencia Morales    Reason for Consult: pyelonephritis, fever, small abscess    History of Present Illness: Junior Layton is a 51 y.o. female with history of COPD, anxiety, HTN, substance abuse who came to the hospital for pyelonephritis. Reported intermittent dysuria for about 1 week prior to admission. CT scan on admission with left pyelonephritis, she continued to have fevers and abdominal pain so another CT was performed today which revealed a possible small abscess present. Urine culture positive E. Coli, her fevers seem to be improving and her wbc has normalized. Blood cultures negative.      Past Medical History:   Past Medical History:   Diagnosis Date    Anxiety     Arthritis     h/o C6 fx, bulging disc on back    COPD (chronic obstructive pulmonary disease) ([facility])     [place], surgical     High blood pressure     Neck fracture ([facility])     Stroke ([facility])     right facial droop sl drool    Substance abuse ([facility] [address] 10/11/2013       Past Surgical History:  Past Surgical History:   Procedure Laterality Date    ABDOMEN SURGERY      exploratory    ANTERIOR CRUCIATE LIGAMENT REPAIR Left 9/13/2019    LEFT KNEE ARTHROSCOPY, ANTERIOR CRUCIATE LIGAMENT RECONSTRUCTION WITH ACHILLES ALLOGRAFT; MEDIAL MENISCECTOMY REPAIR performed by Kelvin Price MD at [address] removed from face as a 6th grader       Social History:  Social History     Socioeconomic History    Marital status: Single     Spouse name: Not on file    Number of children: 2    Years of education: Not on file    Highest education level: Not on file   Occupational History    Occupation: NA   Tobacco Use    Smoking status: Every Day     Packs/day: 1.00     Years: 20.00     Pack years: 20.00     Types: Cigarettes    Smokeless tobacco: Never   Vaping Use    Vaping Use: Never used   Substance and Sexual Activity    Alcohol use: No    Drug use: Yes     Types: Marijuana Ace Sandman)    Sexual activity: Not Currently   Other Topics Concern    Not on file   Social History Narrative    Not on file     Social Determinants of Health     Financial Resource Strain: Not on file   Food Insecurity: Not on file   Transportation Needs: Not on file   Physical Activity: Not on file   Stress: Not on file   Social Connections: Not on file   Intimate Partner Violence: Not on file   Housing Stability: Not on file       Family History:  Family History   Problem Relation Age of Onset    Heart Failure Mother         pace maker    COPD Mother     COPD Father     Heart Attack Father     Heart Disease Father         stents/bypass    Cancer Maternal Aunt         breast/lung    Arthritis Other     Diabetes Other     High Blood Pressure Other     Stroke Sister 27    Lupus Sister     Heart Disease Brother     Heart Attack Brother     COPD Maternal Grandmother     No Known Problems Maternal Grandfather     No Known Problems Paternal Grandmother     No Known Problems Paternal Grandfather     Heart Disease Sister     Breast Cancer Maternal Aunt        Meds:   Current Facility-Administered Medications: ciprofloxacin (CIPRO) IVPB 400 mg, 400 mg, IntraVENous, Q12H  pantoprazole (PROTONIX) tablet 40 mg, 40 mg, Oral, QAM AC  enoxaparin (LOVENOX) injection 40 mg, 40 mg, SubCUTAneous, Daily  calcium carbonate (TUMS) chewable tablet 500 mg, 500 mg, Oral, TID PRN  scopolamine (TRANSDERM-SCOP) transdermal patch 1 patch, 1 patch, TransDERmal, Q72H  hydrALAZINE (APRESOLINE) injection 5 mg, 5 mg, IntraVENous, Q6H PRN  oxyCODONE HCl (OXY-IR) immediate release tablet 10 mg, 10 mg, Oral, Q4H PRN  morphine (PF) injection 4 mg, 4 mg, IntraVENous, Q4H PRN  ondansetron (ZOFRAN) injection 4 mg, 4 mg, IntraVENous, Q6H PRN  sodium chloride flush 0.9 % injection 5-40 mL, 5-40 mL, IntraVENous, 2 times per day  sodium chloride flush 0.9 % injection 5-40 mL, 5-40 mL, IntraVENous, PRN  0.9 % sodium chloride infusion, , IntraVENous, PRN  acetaminophen (TYLENOL) tablet 650 mg, 650 mg, Oral, Q6H PRN **OR** acetaminophen (TYLENOL) suppository 650 mg, 650 mg, Rectal, Q6H PRN  0.9 % sodium chloride infusion, , IntraVENous, Continuous  [COMPLETED] cefepime (MAXIPIME) 2,000 mg in sodium chloride 0.9 % 50 mL IVPB (mini-bag), 2,000 mg, IntraVENous, Once **FOLLOWED BY** cefepime (MAXIPIME) 1,000 mg in sodium chloride 0.9 % 50 mL IVPB (mini-bag), 1,000 mg, IntraVENous, Q12H  nicotine (NICODERM CQ) 14 MG/24HR 1 patch, 1 patch, TransDERmal, Daily    Vitals:  BP (!) 153/101   Pulse 99   Temp 98 °F (36.7 °C) (Oral)   Resp 15   Ht 5' 4\" (1.626 m)   Wt 144 lb 6.4 oz (65.5 kg)   SpO2 100%   BMI 24.79 kg/m²     Intake/Output Summary (Last 24 hours) at 2/21/2023 1311  Last data filed at 2/21/2023 1242  Gross per 24 hour   Intake 77227.75 ml   Output --   Net 82685.75 ml       Review of Systems:  10 Systems were reviewed and negative except as in HPI      Physical Exam:  General Appearance: Alert and oriented, cooperative, no distress, appears stated age  Head: Normocephalic, without obvious abnormality, atraumatic  Back: left CVA tenderness  Lungs: respirations unlabored, no wheezing  Heart: Regular rate and rhythm, no lower extremity edema noted  Abdomen: Soft, non-tender, non-distended, no masses  Skin: Skin color, texture, turgor normal, no rashes or lesions  Neurologic: no gross deficits  Female :   Bladder is non tender  left CVA tenderness  Spontaneously voiding  Pelvic Exam Not Indicated    Labs:  CBC   Lab Results   Component Value Date/Time    WBC 10.5 02/21/2023 05:48 AM    RBC 3.48 02/21/2023 05:48 AM    HGB 10.8 02/21/2023 05:48 AM    HCT 31.6 02/21/2023 05:48 AM    MCV 90.9 02/21/2023 05:48 AM    MCH 30.9 02/21/2023 05:48 AM    MCHC 34.0 02/21/2023 05:48 AM    RDW 14.9 02/21/2023 05:48 AM     02/21/2023 05:48 AM    MPV 8.3 02/21/2023 05:48 AM     BMP   Lab Results   Component Value Date/Time     02/21/2023 05:48 AM    K 3.4 02/21/2023 05:48 AM    K 3.9 02/17/2023 02:40 PM  02/21/2023 05:48 AM    CO2 16 02/21/2023 05:48 AM    BUN 15 02/21/2023 05:48 AM    CREATININE 1.3 02/21/2023 05:48 AM    GLUCOSE 74 02/21/2023 05:48 AM    CALCIUM 7.5 02/21/2023 05:48 AM       Urinalysis:   Lab Results   Component Value Date/Time    COLORU Yellow 02/17/2023 03:00 PM    GLUCOSEU Negative 02/17/2023 03:00 PM    BLOODU LARGE 02/17/2023 03:00 PM    NITRU POSITIVE 02/17/2023 03:00 PM    LEUKOCYTESUR SMALL 02/17/2023 03:00 PM       Imaging: Pertinent images and radiologist's report were reviewed independently  CT abdomen/pelvis w/o contrast 2/17/23  Impression   1. Left renal cortical thinning and adjacent perinephric fat stranding with   no hydronephrosis or evident renal stone. The findings are suspicious for   pyelonephritis. Correlation with urinalysis is suggested. 2. No acute findings elsewhere in the abdomen or pelvis. 3. Age advanced atherosclerosis of the abdominal aorta and its branches. 4. Hepatomegaly. 5. Grade 1 anterolisthesis of L5 on S1 related to bilateral L5 spondylolysis   and advanced degenerative disc disease at L5-S1. CT abdomen/pelvis w/o contrast 2/21/23  Impression   Heterogeneous appearance to the left kidney with perinephric fat stranding,   compatible with given history of pyelonephritis. An ill-defined area of   hypodensity is seen in left kidney, similar to prior. This area could   represent a pre-existing cyst or and early abscess-phlegmon       Scattered small retroperitoneal nodes are seen, likely reactive       Findings of fluid overload, increased compared to prior, denoted by small   pleural effusions, body wall anasarca, and mild increased abdominal and   pelvic ascites. Impression/Plan:   - 49y.o. female with pyelonephritis, small abscess. - Reviewed image with Dr. Tabatha Hay. Do not think the abscess is large enough to be drained with IR. Will continue antibiotics and monitor, especially with her wbc and fevers improving at this time.    - Will follow    Tara Pete, TERRA - CNP 2/73/30583:62 PM

## 2023-02-21 NOTE — PROGRESS NOTES
Hospitalist Progress Note      PCP: No primary care provider on file. Date of Admission: 2/17/2023    Chief Complaint: fever, abdominal pain. Hospital Course:    51 y.o. female with PMHx of Anxiety, COPD, HTN and substance abuse presented to [facility] in transfer from [facility]"" OF [facility] for management of sepsis with pyelonephritis. Pt presented to [place] ED with fever and lower abdominal pain present for several day.  + nausea and vomiting yesterday. No urinary symptoms. + Suprapubic abdominal pain,     Admitted with pyelonephritis. Subjective:   Patient with continued fever. Last spike 39.2 at 2000 yesterday. Continues to report abdominal pain. Medications:  Reviewed    Infusion Medications    sodium chloride      sodium chloride 150 mL/hr at 02/21/23 0816     Scheduled Medications    ciprofloxacin  400 mg IntraVENous Q12H    pantoprazole  40 mg Oral QAM AC    enoxaparin  40 mg SubCUTAneous Daily    scopolamine  1 patch TransDERmal Q72H    sodium chloride flush  5-40 mL IntraVENous 2 times per day    cefepime  1,000 mg IntraVENous Q12H    nicotine  1 patch TransDERmal Daily     PRN Meds: calcium carbonate, hydrALAZINE, oxyCODONE, morphine, ondansetron, sodium chloride flush, sodium chloride, acetaminophen **OR** acetaminophen      Intake/Output Summary (Last 24 hours) at 2/21/2023 1047  Last data filed at 2/21/2023 1031  Gross per 24 hour   Intake 16571.75 ml   Output --   Net 90403.75 ml         Physical Exam Performed:    BP (!) 145/86   Pulse (!) 103   Temp 99.1 °F (37.3 °C) (Oral)   Resp 16   Ht 5' 4\" (1.626 m)   Wt 144 lb 6.4 oz (65.5 kg)   SpO2 95%   BMI 24.79 kg/m²     General appearance: No apparent distress, appears stated age and cooperative. HEENT: Pupils equal, round, and reactive to light. Conjunctivae/corneas clear. Neck: Supple, with full range of motion. No jugular venous distention. Trachea midline. Respiratory:  Normal respiratory effort.  Clear to auscultation, bilaterally without Rales/Wheezes/Rhonchi. Cardiovascular: Regular rate and rhythm with normal S1/S2 without murmurs, rubs or gallops. Abdomen: abdominal tenderness present. Musculoskeletal: No clubbing, cyanosis or edema bilaterally. Full range of motion without deformity. Skin: Skin color, texture, turgor normal.  No rashes or lesions. Neurologic:  Neurovascularly intact without any focal sensory/motor deficits. Cranial nerves: II-XII intact, grossly non-focal.  Psychiatric: Alert and oriented, thought content appropriate, normal insight  Capillary Refill: Brisk, 3 seconds, normal   Peripheral Pulses: +2 palpable, equal bilaterally       Labs:   Recent Labs     02/19/23  0535 02/20/23  0730 02/21/23  0548   WBC 12.8* 12.0* 10.5   HGB 10.1* 10.9* 10.8*   HCT 29.9* 33.0* 31.6*    160 166       Recent Labs     02/19/23  0535 02/20/23  0730 02/21/23  0548   * 137 135*   K 3.6 3.8 3.4*    107 108   CO2 16* 17* 16*   BUN 20 17 15   CREATININE 1.5* 1.5* 1.3*   CALCIUM 7.3* 8.0* 7.5*       No results for input(s): AST, ALT, BILIDIR, BILITOT, ALKPHOS in the last 72 hours. No results for input(s): INR in the last 72 hours. No results for input(s): Mariana Hindu in the last 72 hours. Urinalysis:      Lab Results   Component Value Date/Time    NITRU POSITIVE 02/17/2023 03:00 PM    WBCUA  02/17/2023 03:00 PM    BACTERIA 2+ 02/17/2023 03:00 PM    RBCUA  02/17/2023 03:00 PM    BLOODU LARGE 02/17/2023 03:00 PM    SPECGRAV 1.020 02/17/2023 03:00 PM    GLUCOSEU Negative 02/17/2023 03:00 PM       Radiology:  CT ABDOMEN PELVIS WO CONTRAST Additional Contrast? None   Final Result   Heterogeneous appearance to the left kidney with perinephric fat stranding,   compatible with given history of pyelonephritis. An ill-defined area of   hypodensity is seen in left kidney, similar to prior.   This area could   represent a pre-existing cyst or and early abscess-phlegmon      Scattered small retroperitoneal nodes are seen, likely reactive      Findings of fluid overload, increased compared to prior, denoted by small   pleural effusions, body wall anasarca, and mild increased abdominal and   pelvic ascites. CT ABDOMEN PELVIS WO CONTRAST Additional Contrast? None   Final Result   1. Left renal cortical thinning and adjacent perinephric fat stranding with   no hydronephrosis or evident renal stone. The findings are suspicious for   pyelonephritis. Correlation with urinalysis is suggested. 2. No acute findings elsewhere in the abdomen or pelvis. 3. Age advanced atherosclerosis of the abdominal aorta and its branches. 4. Hepatomegaly. 5. Grade 1 anterolisthesis of L5 on S1 related to bilateral L5 spondylolysis   and advanced degenerative disc disease at L5-S1. XR CHEST PORTABLE   Final Result   No radiographic evidence of an acute cardiopulmonary process. IP CONSULT TO UROLOGY    Assessment/Plan:    Active Hospital Problems    Diagnosis     Sepsis ([address] [A41.9]      Priority: Medium    Pyelonephritis [N12]      Priority: Medium    Acute kidney injury ([address] [N17.9]      Priority: Medium     Sepsis ([address] on admission due to Acute Pyelonephritis  -patient presented with Tachycardia,  Neutrophilic Leukocytosis and infection, nitial Lactic Acid 2.4 > 1.7.   -Ct abd/pelv: Left renal cortical thinning and adjacent perinephric fat stranding, concerning for  pyelonephritis. Urine culture positive for sensitive Ecoli. Patient continues to spike fever after 4 days of AB, repeated CT 2/21 with ? Small cyst vs abscess. Plan   - Urine  sensitive E. Coli, will add cipro to cefipime given continued fever.   -consult urology for possible abscess.    - Hydrate with IVF     Acute kidney injury -   the etiology is unclear but considerations include dehydration, hypotension, nephrotoxic medications, and ATN  - crt baseline 0.8,  today 2.5--> 1.9--> 1.5--> 1.3  - IVF  - Avoid nephrotoxins    Uncontrolled pain. Continue prn oxycodone. COPD (chronic obstructive pulmonary disease)   - without acute exacerbation.   - Nebulizer treatments as needed. HTN  - monitor blood pressure  - currently hypotensive will hold home meds     Anxiety  - supportive therapy  - resume    Hypokalemia. Replaced. DVT Prophylaxis: lovenox  Diet: ADULT DIET; Regular  Code Status: Full Code  PT/OT Eval Status: baseline .      Dispo -pending clinical improvement      Apple Toth MD

## 2023-02-21 NOTE — PLAN OF CARE
Problem: Discharge Planning  Goal: Discharge to home or other facility with appropriate resources  Outcome: Progressing     Problem: Pain  Goal: Verbalizes/displays adequate comfort level or baseline comfort level  Outcome: Progressing     Problem: Chronic Conditions and Co-morbidities  Goal: Patient's chronic conditions and co-morbidity symptoms are monitored and maintained or improved  Outcome: Progressing     Problem: Cardiovascular - Adult  Goal: Maintains optimal cardiac output and hemodynamic stability  Outcome: Progressing  Goal: Absence of cardiac dysrhythmias or at baseline  Outcome: Progressing     Problem: Gastrointestinal - Adult  Goal: Minimal or absence of nausea and vomiting  Outcome: Progressing     Problem: Infection - Adult  Goal: Absence of infection at discharge  Outcome: Progressing  Goal: Absence of infection during hospitalization  Outcome: Progressing  Goal: Absence of fever/infection during anticipated neutropenic period  Outcome: Progressing     Problem: Metabolic/Fluid and Electrolytes - Adult  Goal: Electrolytes maintained within normal limits  Outcome: Progressing

## 2023-02-21 NOTE — PROGRESS NOTES
Pt requested new IV d/t current AC location causes frequent occlusion with IV pump. Charge nurse Dewayne to pt's room to insert IV. Pt became argumentative and stated she didn't need an IV. Pt was pulling at IV and yanking arm around while RN was changing IV dressing. Pt requested pain and nausea meds from RN. Pt stated she was going to take a shower after she received them. While RN was preparing meds, pt got up from bed, ambulated into bathroom, and shut the door. When RN questioned if she was in need of meds pt became verbally aggressive with RN stating \"can't I go fucray metcalf\". RN replied that she was unaware of what pt was doing since she had just stated she was going to shower and that she would need to return back to the bedside to receive meds. Pt stated to RN that she has \"piss poor bedside manners\". RN educated pt that it is helpful for pt to state what she is doing such as \"i'll be right back, I need to use the bathroom\" vs just walking away. Pt continued to make comments under her breath. Electronically signed by Artie Santiago RN on 2/21/23 at 10:39 AM EST    Pt called out to RN requesting new IV again. Charge nurse Dewayne entered pt's room to place new IV. Pt began to yell at RN and stated she didn't need an IV. Then stated to another RN that previous RN was Isrrael sanford\". Pt has remained in bed all of shift except to use restroom. Continues to c/o abd pain. PRN pain meds administered. Occ c/o nausea- prn Zofran administered. RN updated pt on urology consult. Pt responded \"i'm sure they will wait to come in when I'm sleeping\". Electronically signed by Artie Santiago RN on 2/21/23 at 4:02 PM EST    Emesis x1 at this time. Electronically signed by Artie Santiago RN on 2/21/23 at 4:16 PM EST    Pt once again requested a new IV. Nurse Mitali Jung to room to place IV. Pt allowed nurse to stick her but then yanked arm back. Refused to let RN attempt again.    Electronically signed by Artie Santiago RN on 2/21/23 at 5:22 PM EST

## 2023-02-22 LAB
ANION GAP SERPL CALCULATED.3IONS-SCNC: 9 MMOL/L (ref 3–16)
BASOPHILS ABSOLUTE: 0 K/UL (ref 0–0.2)
BASOPHILS RELATIVE PERCENT: 0.2 %
BUN BLDV-MCNC: 14 MG/DL (ref 7–20)
CALCIUM SERPL-MCNC: 7.2 MG/DL (ref 8.3–10.6)
CHLORIDE BLD-SCNC: 107 MMOL/L (ref 99–110)
CO2: 17 MMOL/L (ref 21–32)
CREAT SERPL-MCNC: 1.1 MG/DL (ref 0.6–1.1)
EOSINOPHILS ABSOLUTE: 0.1 K/UL (ref 0–0.6)
EOSINOPHILS RELATIVE PERCENT: 0.5 %
GFR SERPL CREATININE-BSD FRML MDRD: >60 ML/MIN/{1.73_M2}
GLUCOSE BLD-MCNC: 100 MG/DL (ref 70–99)
HCT VFR BLD CALC: 30.3 % (ref 36–48)
HEMOGLOBIN: 10 G/DL (ref 12–16)
LYMPHOCYTES ABSOLUTE: 0.7 K/UL (ref 1–5.1)
LYMPHOCYTES RELATIVE PERCENT: 5.7 %
MCH RBC QN AUTO: 30.2 PG (ref 26–34)
MCHC RBC AUTO-ENTMCNC: 32.9 G/DL (ref 31–36)
MCV RBC AUTO: 91.8 FL (ref 80–100)
MONOCYTES ABSOLUTE: 1.1 K/UL (ref 0–1.3)
MONOCYTES RELATIVE PERCENT: 8.7 %
NEUTROPHILS ABSOLUTE: 10.3 K/UL (ref 1.7–7.7)
NEUTROPHILS RELATIVE PERCENT: 84.9 %
PDW BLD-RTO: 14.6 % (ref 12.4–15.4)
PLATELET # BLD: 217 K/UL (ref 135–450)
PMV BLD AUTO: 7.9 FL (ref 5–10.5)
POTASSIUM SERPL-SCNC: 3.4 MMOL/L (ref 3.5–5.1)
RBC # BLD: 3.3 M/UL (ref 4–5.2)
SODIUM BLD-SCNC: 133 MMOL/L (ref 136–145)
WBC # BLD: 12.1 K/UL (ref 4–11)

## 2023-02-22 PROCEDURE — 6370000000 HC RX 637 (ALT 250 FOR IP): Performed by: STUDENT IN AN ORGANIZED HEALTH CARE EDUCATION/TRAINING PROGRAM

## 2023-02-22 PROCEDURE — 6370000000 HC RX 637 (ALT 250 FOR IP): Performed by: NURSE PRACTITIONER

## 2023-02-22 PROCEDURE — 94760 N-INVAS EAR/PLS OXIMETRY 1: CPT

## 2023-02-22 PROCEDURE — 6360000002 HC RX W HCPCS: Performed by: INTERNAL MEDICINE

## 2023-02-22 PROCEDURE — 2060000000 HC ICU INTERMEDIATE R&B

## 2023-02-22 PROCEDURE — 80048 BASIC METABOLIC PNL TOTAL CA: CPT

## 2023-02-22 PROCEDURE — 85025 COMPLETE CBC W/AUTO DIFF WBC: CPT

## 2023-02-22 PROCEDURE — 6360000002 HC RX W HCPCS: Performed by: STUDENT IN AN ORGANIZED HEALTH CARE EDUCATION/TRAINING PROGRAM

## 2023-02-22 PROCEDURE — 2580000003 HC RX 258: Performed by: INTERNAL MEDICINE

## 2023-02-22 PROCEDURE — 6370000000 HC RX 637 (ALT 250 FOR IP): Performed by: HOSPITALIST

## 2023-02-22 PROCEDURE — 36415 COLL VENOUS BLD VENIPUNCTURE: CPT

## 2023-02-22 PROCEDURE — 6370000000 HC RX 637 (ALT 250 FOR IP): Performed by: INTERNAL MEDICINE

## 2023-02-22 RX ORDER — POLYETHYLENE GLYCOL 3350 17 G/17G
17 POWDER, FOR SOLUTION ORAL DAILY PRN
Status: DISCONTINUED | OUTPATIENT
Start: 2023-02-22 | End: 2023-02-26 | Stop reason: HOSPADM

## 2023-02-22 RX ADMIN — SODIUM CHLORIDE, PRESERVATIVE FREE 10 ML: 5 INJECTION INTRAVENOUS at 20:36

## 2023-02-22 RX ADMIN — CEFEPIME 1000 MG: 1 INJECTION, POWDER, FOR SOLUTION INTRAMUSCULAR; INTRAVENOUS at 20:23

## 2023-02-22 RX ADMIN — CIPROFLOXACIN 400 MG: 2 INJECTION, SOLUTION INTRAVENOUS at 10:27

## 2023-02-22 RX ADMIN — CEFEPIME 1000 MG: 1 INJECTION, POWDER, FOR SOLUTION INTRAMUSCULAR; INTRAVENOUS at 11:58

## 2023-02-22 RX ADMIN — SODIUM CHLORIDE: 9 INJECTION, SOLUTION INTRAVENOUS at 11:56

## 2023-02-22 RX ADMIN — SODIUM CHLORIDE: 9 INJECTION, SOLUTION INTRAVENOUS at 03:08

## 2023-02-22 RX ADMIN — SODIUM CHLORIDE: 9 INJECTION, SOLUTION INTRAVENOUS at 10:25

## 2023-02-22 RX ADMIN — CIPROFLOXACIN 400 MG: 2 INJECTION, SOLUTION INTRAVENOUS at 00:16

## 2023-02-22 RX ADMIN — CIPROFLOXACIN 400 MG: 2 INJECTION, SOLUTION INTRAVENOUS at 22:42

## 2023-02-22 RX ADMIN — OXYCODONE HYDROCHLORIDE 10 MG: 10 TABLET ORAL at 20:34

## 2023-02-22 RX ADMIN — ACETAMINOPHEN 325MG 650 MG: 325 TABLET ORAL at 22:44

## 2023-02-22 RX ADMIN — OXYCODONE HYDROCHLORIDE 10 MG: 10 TABLET ORAL at 02:43

## 2023-02-22 RX ADMIN — OXYCODONE HYDROCHLORIDE 10 MG: 10 TABLET ORAL at 06:39

## 2023-02-22 RX ADMIN — OXYCODONE HYDROCHLORIDE 10 MG: 10 TABLET ORAL at 14:19

## 2023-02-22 RX ADMIN — POLYETHYLENE GLYCOL 3350 17 G: 17 POWDER, FOR SOLUTION ORAL at 16:03

## 2023-02-22 RX ADMIN — MORPHINE SULFATE 4 MG: 4 INJECTION, SOLUTION INTRAMUSCULAR; INTRAVENOUS at 10:29

## 2023-02-22 RX ADMIN — ONDANSETRON 4 MG: 2 INJECTION INTRAMUSCULAR; INTRAVENOUS at 20:34

## 2023-02-22 RX ADMIN — SODIUM CHLORIDE: 9 INJECTION, SOLUTION INTRAVENOUS at 18:19

## 2023-02-22 RX ADMIN — PANTOPRAZOLE SODIUM 40 MG: 40 TABLET, DELAYED RELEASE ORAL at 06:07

## 2023-02-22 RX ADMIN — ACETAMINOPHEN 325MG 650 MG: 325 TABLET ORAL at 17:02

## 2023-02-22 RX ADMIN — ONDANSETRON 4 MG: 2 INJECTION INTRAMUSCULAR; INTRAVENOUS at 10:27

## 2023-02-22 ASSESSMENT — PAIN SCALES - WONG BAKER
WONGBAKER_NUMERICALRESPONSE: 2
WONGBAKER_NUMERICALRESPONSE: 0

## 2023-02-22 ASSESSMENT — PAIN DESCRIPTION - LOCATION
LOCATION: ABDOMEN;BACK
LOCATION: ABDOMEN;BACK;FLANK
LOCATION: ABDOMEN
LOCATION: ABDOMEN;GENERALIZED
LOCATION: HEAD
LOCATION: ABDOMEN;BACK;GENERALIZED

## 2023-02-22 ASSESSMENT — PAIN SCALES - GENERAL
PAINLEVEL_OUTOF10: 6
PAINLEVEL_OUTOF10: 6
PAINLEVEL_OUTOF10: 7
PAINLEVEL_OUTOF10: 6
PAINLEVEL_OUTOF10: 6
PAINLEVEL_OUTOF10: 7
PAINLEVEL_OUTOF10: 6
PAINLEVEL_OUTOF10: 3
PAINLEVEL_OUTOF10: 4

## 2023-02-22 ASSESSMENT — PAIN - FUNCTIONAL ASSESSMENT
PAIN_FUNCTIONAL_ASSESSMENT: PREVENTS OR INTERFERES SOME ACTIVE ACTIVITIES AND ADLS
PAIN_FUNCTIONAL_ASSESSMENT: ACTIVITIES ARE NOT PREVENTED
PAIN_FUNCTIONAL_ASSESSMENT: ACTIVITIES ARE NOT PREVENTED
PAIN_FUNCTIONAL_ASSESSMENT: PREVENTS OR INTERFERES SOME ACTIVE ACTIVITIES AND ADLS

## 2023-02-22 ASSESSMENT — PAIN DESCRIPTION - ORIENTATION
ORIENTATION: MID
ORIENTATION: LEFT
ORIENTATION: LEFT
ORIENTATION: LEFT;RIGHT
ORIENTATION: LEFT

## 2023-02-22 ASSESSMENT — PAIN DESCRIPTION - FREQUENCY
FREQUENCY: CONTINUOUS
FREQUENCY: INTERMITTENT

## 2023-02-22 ASSESSMENT — PAIN DESCRIPTION - ONSET
ONSET: ON-GOING
ONSET: GRADUAL

## 2023-02-22 ASSESSMENT — PAIN DESCRIPTION - DESCRIPTORS
DESCRIPTORS: POUNDING;ACHING;THROBBING
DESCRIPTORS: DISCOMFORT
DESCRIPTORS: STABBING
DESCRIPTORS: ACHING;DISCOMFORT
DESCRIPTORS: ACHING;DISCOMFORT

## 2023-02-22 ASSESSMENT — PAIN DESCRIPTION - PAIN TYPE
TYPE: ACUTE PAIN

## 2023-02-22 NOTE — PROGRESS NOTES
Pt Name: Jie Farias  Medical Record Number: [id number]  Date of Birth 1975   Today's Date: 2/22/2023      Subjective: In bed, not engaging very much when asking questions    ROS: Constitutional: Fever up to 100.5 last night    Vitals:  Vitals:    02/22/23 0243 02/22/23 0313 02/22/23 0513 02/22/23 0639   BP:   126/81    Pulse:       Resp: 26 20 22 18   Temp:   98.4 °F (36.9 °C)    TempSrc:   Oral    SpO2:   98%    Weight:   141 lb 5 oz (64.1 kg)    Height:         I/O last 3 completed shifts: In: 01959.4 [P.O.:840; I.V.:03626.5; IV Piggyback:376.9]  Out: -     Exam:  General: Awake, oriented, no acute distress  Respiratory: Nonlabored breathing  Abdomen: Soft, left flank-tender, non-distended, no masses  : spontaneously voiding  Skin: Skin color, texture, turgor normal, no rashes or lesions  Neurologic: no gross deficits    CURRENT MEDICATIONS   Scheduled Meds:   ciprofloxacin  400 mg IntraVENous Q12H    pantoprazole  40 mg Oral QAM AC    enoxaparin  40 mg SubCUTAneous Daily    scopolamine  1 patch TransDERmal Q72H    sodium chloride flush  5-40 mL IntraVENous 2 times per day    cefepime  1,000 mg IntraVENous Q12H    nicotine  1 patch TransDERmal Daily     Continuous Infusions:   sodium chloride      sodium chloride 150 mL/hr at 02/22/23 0308     PRN Meds:.calcium carbonate, hydrALAZINE, oxyCODONE, morphine, ondansetron, sodium chloride flush, sodium chloride, acetaminophen **OR** acetaminophen    LABS     Recent Labs     02/20/23  0730 02/21/23  0548   WBC 12.0* 10.5   HGB 10.9* 10.8*   HCT 33.0* 31.6*    166    135*   K 3.8 3.4*    108   CO2 17* 16*   BUN 17 15   CREATININE 1.5* 1.3*   CALCIUM 8.0* 7.5*     CT abdomen/pelvis w/o contrast 2/17/23  Impression   1. Left renal cortical thinning and adjacent perinephric fat stranding with   no hydronephrosis or evident renal stone. The findings are suspicious for   pyelonephritis. Correlation with urinalysis is suggested.    2. No acute findings elsewhere in the abdomen or pelvis. 3. Age advanced atherosclerosis of the abdominal aorta and its branches. 4. Hepatomegaly. 5. Grade 1 anterolisthesis of L5 on S1 related to bilateral L5 spondylolysis   and advanced degenerative disc disease at L5-S1. CT abdomen/pelvis w/o contrast 2/21/23  Impression   Heterogeneous appearance to the left kidney with perinephric fat stranding,   compatible with given history of pyelonephritis. An ill-defined area of   hypodensity is seen in left kidney, similar to prior. This area could   represent a pre-existing cyst or and early abscess-phlegmon       Scattered small retroperitoneal nodes are seen, likely reactive       Findings of fluid overload, increased compared to prior, denoted by small   pleural effusions, body wall anasarca, and mild increased abdominal and   pelvic ascites. Blood cultures no growth  Urine culture [address]. [address] day # 5  49y.o. female with left pyelonephritis, small abscess.   Do not think abscess is large enough to be drained  Labs pending for today  PLAN   Continue antibiotics and monitor, awaiting labs for today    TERRA Bhagat CNP 2/22/2023 8:24 AM

## 2023-02-22 NOTE — PLAN OF CARE
Problem: Discharge Planning  Goal: Discharge to home or other facility with appropriate resources  2/21/2023 2331 by Sid Carmichael RN  Outcome: Progressing     Problem: Pain  Goal: Verbalizes/displays adequate comfort level or baseline comfort level  2/21/2023 2331 by Sid Carmichael RN  Outcome: Progressing     Problem: Chronic Conditions and Co-morbidities  Goal: Patient's chronic conditions and co-morbidity symptoms are monitored and maintained or improved  2/21/2023 2331 by Sid Carmichael RN  Outcome: Progressing     Problem: Cardiovascular - Adult  Goal: Maintains optimal cardiac output and hemodynamic stability  2/21/2023 2331 by Sid Carmichael RN  Outcome: Progressing     Problem: Cardiovascular - Adult  Goal: Absence of cardiac dysrhythmias or at baseline  2/21/2023 2331 by Sid Carmichael RN  Outcome: Progressing     Problem: Gastrointestinal - Adult  Goal: Minimal or absence of nausea and vomiting  2/21/2023 2331 by Sid Carmichael RN  Outcome: Progressing     Problem: Infection - Adult  Goal: Absence of infection at discharge  2/21/2023 2331 by Sid Carmichael RN  Outcome: Progressing     Problem: Infection - Adult  Goal: Absence of infection during hospitalization  2/21/2023 2331 by Sid Carmichael RN  Outcome: Progressing     Problem: Infection - Adult  Goal: Absence of fever/infection during anticipated neutropenic period  2/21/2023 2331 by Sid Carmichael RN  Outcome: Progressing     Problem: Metabolic/Fluid and Electrolytes - Adult  Goal: Electrolytes maintained within normal limits  2/21/2023 2331 by Sid Carmichael RN  Outcome: Progressing

## 2023-02-22 NOTE — PROGRESS NOTES
Hospitalist Progress Note      PCP: No primary care provider on file. Date of Admission: 2/17/2023    Chief Complaint: fever, abdominal pain. Hospital Course:    51 y.o. female with PMHx of Anxiety, COPD, HTN and substance abuse presented to [facility] [address] in transfer from [facility] for management of sepsis with pyelonephritis. Pt presented to [facility] ED with fever and lower abdominal pain present for several day.  + nausea and vomiting yesterday. No urinary symptoms. + Suprapubic abdominal pain,     Admitted with pyelonephritis. Subjective:   Complain of flank pain   Has small kidney abscess not enough to drain per urology   Continues to report abdominal pain. Medications:  Reviewed    Infusion Medications    sodium chloride      sodium chloride 150 mL/hr at 02/22/23 1025     Scheduled Medications    ciprofloxacin  400 mg IntraVENous Q12H    pantoprazole  40 mg Oral QAM AC    enoxaparin  40 mg SubCUTAneous Daily    scopolamine  1 patch TransDERmal Q72H    sodium chloride flush  5-40 mL IntraVENous 2 times per day    cefepime  1,000 mg IntraVENous Q12H    nicotine  1 patch TransDERmal Daily     PRN Meds: calcium carbonate, hydrALAZINE, oxyCODONE, morphine, ondansetron, sodium chloride flush, sodium chloride, acetaminophen **OR** acetaminophen      Intake/Output Summary (Last 24 hours) at 2/22/2023 1039  Last data filed at 2/22/2023 0944  Gross per 24 hour   Intake 5069.64 ml   Output --   Net 5069.64 ml         Physical Exam Performed:    BP (!) 159/105   Pulse (!) 101   Temp 99.1 °F (37.3 °C) (Oral)   Resp 18   Ht 5' 4\" (1.626 m)   Wt 141 lb 5 oz (64.1 kg)   SpO2 98%   BMI 24.26 kg/m²     General appearance: No apparent distress, appears stated age and cooperative. HEENT: Pupils equal, round, and reactive to light. Conjunctivae/corneas clear. Neck: Supple, with full range of motion. No jugular venous distention. Trachea midline. Respiratory:  Normal respiratory effort.  Clear to auscultation, bilaterally without Rales/Wheezes/Rhonchi. Cardiovascular: Regular rate and rhythm with normal S1/S2 without murmurs, rubs or gallops. Abdomen: abdominal tenderness present. Musculoskeletal: No clubbing, cyanosis or edema bilaterally. Full range of motion without deformity. Skin: Skin color, texture, turgor normal.  No rashes or lesions. Neurologic:  Neurovascularly intact without any focal sensory/motor deficits. Cranial nerves: II-XII intact, grossly non-focal.  Psychiatric: Alert and oriented, thought content appropriate, normal insight  Capillary Refill: Brisk, 3 seconds, normal   Peripheral Pulses: +2 palpable, equal bilaterally       Labs:   Recent Labs     02/20/23  0730 02/21/23  0548 02/22/23  0940   WBC 12.0* 10.5 12.1*   HGB 10.9* 10.8* 10.0*   HCT 33.0* 31.6* 30.3*    166 217       Recent Labs     02/20/23  0730 02/21/23  0548 02/22/23  0939    135* 133*   K 3.8 3.4* 3.4*    108 107   CO2 17* 16* 17*   BUN 17 15 14   CREATININE 1.5* 1.3* 1.1   CALCIUM 8.0* 7.5* 7.2*       No results for input(s): AST, ALT, BILIDIR, BILITOT, ALKPHOS in the last 72 hours. No results for input(s): INR in the last 72 hours. No results for input(s): Yessy Heri in the last 72 hours. Urinalysis:      Lab Results   Component Value Date/Time    NITRU POSITIVE 02/17/2023 03:00 PM    WBCUA  02/17/2023 03:00 PM    BACTERIA 2+ 02/17/2023 03:00 PM    RBCUA  02/17/2023 03:00 PM    BLOODU LARGE 02/17/2023 03:00 PM    SPECGRAV 1.020 02/17/2023 03:00 PM    GLUCOSEU Negative 02/17/2023 03:00 PM       Radiology:  CT ABDOMEN PELVIS WO CONTRAST Additional Contrast? None   Final Result   Heterogeneous appearance to the left kidney with perinephric fat stranding,   compatible with given history of pyelonephritis. An ill-defined area of   hypodensity is seen in left kidney, similar to prior.   This area could   represent a pre-existing cyst or and early abscess-phlegmon Scattered small retroperitoneal nodes are seen, likely reactive      Findings of fluid overload, increased compared to prior, denoted by small   pleural effusions, body wall anasarca, and mild increased abdominal and   pelvic ascites. CT ABDOMEN PELVIS WO CONTRAST Additional Contrast? None   Final Result   1. Left renal cortical thinning and adjacent perinephric fat stranding with   no hydronephrosis or evident renal stone. The findings are suspicious for   pyelonephritis. Correlation with urinalysis is suggested. 2. No acute findings elsewhere in the abdomen or pelvis. 3. Age advanced atherosclerosis of the abdominal aorta and its branches. 4. Hepatomegaly. 5. Grade 1 anterolisthesis of L5 on S1 related to bilateral L5 spondylolysis   and advanced degenerative disc disease at L5-S1. XR CHEST PORTABLE   Final Result   No radiographic evidence of an acute cardiopulmonary process. IP CONSULT TO UROLOGY    Assessment/Plan:    Active Hospital Problems    Diagnosis     Sepsis ([address] [A41.9]      Priority: Medium    Pyelonephritis [N12]      Priority: Medium    Acute kidney injury ([address] [N17.9]      Priority: Medium     Sepsis ([address] on admission due to Acute Pyelonephritis  -patient presented with Tachycardia,  Neutrophilic Leukocytosis and infection, nitial Lactic Acid 2.4 > 1.7.   -Ct abd/pelv: Left renal cortical thinning and adjacent perinephric fat stranding, concerning for  pyelonephritis. Urine culture positive for sensitive Ecoli. Patient continues to spike fever after 4 days of AB, repeated CT 2/21 with ? Small cyst vs abscess. Has small kidney abscess not enough to drain per urology     Plan   - Urine  sensitive E. Coli, will add cipro to cefipime given continued fever.   -consult urology for possible abscess.    - Hydrate with IVF     Acute kidney injury -   the etiology is unclear but considerations include dehydration, hypotension, nephrotoxic medications, and ATN  - crt baseline 0.8,  today 2.5--> 1.9--> 1.5--> 1.3  - IVF  - Avoid nephrotoxins  Today serum creatinine 1.1  Replace hypokalemia     Uncontrolled pain. Continue prn oxycodone and as need iv morphine     COPD (chronic obstructive pulmonary disease)   - without acute exacerbation.   - Nebulizer treatments as needed. HTN  - on iv hydralazine iv as needed        Anxiety  - supportive therapy  - resume    Hypokalemia. Replaced. DVT Prophylaxis: lovenox  Diet: ADULT DIET; Regular  Code Status: Full Code  PT/OT Eval Status: baseline .      Dispo -pending clinical improvement      Ruba Carlson MD

## 2023-02-23 ENCOUNTER — APPOINTMENT (OUTPATIENT)
Dept: CT IMAGING | Age: 48
DRG: 720 | End: 2023-02-23
Payer: COMMERCIAL

## 2023-02-23 ENCOUNTER — ANTI-COAG VISIT (OUTPATIENT)
Dept: PHARMACY | Age: 48
End: 2023-02-23
Payer: COMMERCIAL

## 2023-02-23 LAB
A/G RATIO: 0.8 (ref 1.1–2.2)
ALBUMIN SERPL-MCNC: 2.2 G/DL (ref 3.4–5)
ALP BLD-CCNC: 122 U/L (ref 40–129)
ALT SERPL-CCNC: 11 U/L (ref 10–40)
ANION GAP SERPL CALCULATED.3IONS-SCNC: 11 MMOL/L (ref 3–16)
AST SERPL-CCNC: 15 U/L (ref 15–37)
BASOPHILS ABSOLUTE: 0 K/UL (ref 0–0.2)
BASOPHILS RELATIVE PERCENT: 0.1 %
BILIRUB SERPL-MCNC: 0.6 MG/DL (ref 0–1)
BUN BLDV-MCNC: 10 MG/DL (ref 7–20)
C-REACTIVE PROTEIN: 157.6 MG/L (ref 0–5.1)
CALCIUM SERPL-MCNC: 7.1 MG/DL (ref 8.3–10.6)
CHLORIDE BLD-SCNC: 107 MMOL/L (ref 99–110)
CO2: 17 MMOL/L (ref 21–32)
CREAT SERPL-MCNC: 1.1 MG/DL (ref 0.6–1.1)
EOSINOPHILS ABSOLUTE: 0.1 K/UL (ref 0–0.6)
EOSINOPHILS RELATIVE PERCENT: 0.5 %
GFR SERPL CREATININE-BSD FRML MDRD: >60 ML/MIN/{1.73_M2}
GLUCOSE BLD-MCNC: 91 MG/DL (ref 70–99)
HAV IGM SER IA-ACNC: NORMAL
HCT VFR BLD CALC: 28.7 % (ref 36–48)
HEMOGLOBIN: 9.7 G/DL (ref 12–16)
HEPATITIS B CORE IGM ANTIBODY: NORMAL
HEPATITIS B SURFACE ANTIGEN INTERPRETATION: NORMAL
HEPATITIS C ANTIBODY INTERPRETATION: NORMAL
HIV AG/AB: NORMAL
HIV ANTIGEN: NORMAL
HIV-1 ANTIBODY: NORMAL
HIV-2 AB: NORMAL
INTERNATIONAL NORMALIZATION RATIO, POC: 1.3
LYMPHOCYTES ABSOLUTE: 0.6 K/UL (ref 1–5.1)
LYMPHOCYTES RELATIVE PERCENT: 4.6 %
MCH RBC QN AUTO: 30.6 PG (ref 26–34)
MCHC RBC AUTO-ENTMCNC: 33.9 G/DL (ref 31–36)
MCV RBC AUTO: 90.3 FL (ref 80–100)
MONOCYTES ABSOLUTE: 0.9 K/UL (ref 0–1.3)
MONOCYTES RELATIVE PERCENT: 6.9 %
NEUTROPHILS ABSOLUTE: 11.7 K/UL (ref 1.7–7.7)
NEUTROPHILS RELATIVE PERCENT: 87.9 %
PDW BLD-RTO: 14.4 % (ref 12.4–15.4)
PLATELET # BLD: 246 K/UL (ref 135–450)
PMV BLD AUTO: 7.9 FL (ref 5–10.5)
POTASSIUM SERPL-SCNC: 3.1 MMOL/L (ref 3.5–5.1)
PROCALCITONIN: 1.45 NG/ML (ref 0–0.15)
RBC # BLD: 3.18 M/UL (ref 4–5.2)
SEDIMENTATION RATE, ERYTHROCYTE: 27 MM/HR (ref 0–20)
SODIUM BLD-SCNC: 135 MMOL/L (ref 136–145)
TOTAL PROTEIN: 4.8 G/DL (ref 6.4–8.2)
WBC # BLD: 13.2 K/UL (ref 4–11)

## 2023-02-23 PROCEDURE — 74177 CT ABD & PELVIS W/CONTRAST: CPT

## 2023-02-23 PROCEDURE — 87077 CULTURE AEROBIC IDENTIFY: CPT

## 2023-02-23 PROCEDURE — 6370000000 HC RX 637 (ALT 250 FOR IP): Performed by: STUDENT IN AN ORGANIZED HEALTH CARE EDUCATION/TRAINING PROGRAM

## 2023-02-23 PROCEDURE — 10160 PNXR ASPIR ABSC HMTMA BULLA: CPT

## 2023-02-23 PROCEDURE — 86701 HIV-1ANTIBODY: CPT

## 2023-02-23 PROCEDURE — 6370000000 HC RX 637 (ALT 250 FOR IP): Performed by: NURSE PRACTITIONER

## 2023-02-23 PROCEDURE — 6360000004 HC RX CONTRAST MEDICATION: Performed by: NURSE PRACTITIONER

## 2023-02-23 PROCEDURE — 86702 HIV-2 ANTIBODY: CPT

## 2023-02-23 PROCEDURE — 6360000002 HC RX W HCPCS: Performed by: INTERNAL MEDICINE

## 2023-02-23 PROCEDURE — 85652 RBC SED RATE AUTOMATED: CPT

## 2023-02-23 PROCEDURE — 86140 C-REACTIVE PROTEIN: CPT

## 2023-02-23 PROCEDURE — 87186 SC STD MICRODIL/AGAR DIL: CPT

## 2023-02-23 PROCEDURE — 85610 PROTHROMBIN TIME: CPT

## 2023-02-23 PROCEDURE — 77012 CT SCAN FOR NEEDLE BIOPSY: CPT

## 2023-02-23 PROCEDURE — 2580000003 HC RX 258: Performed by: INTERNAL MEDICINE

## 2023-02-23 PROCEDURE — 6370000000 HC RX 637 (ALT 250 FOR IP): Performed by: HOSPITALIST

## 2023-02-23 PROCEDURE — 85025 COMPLETE CBC W/AUTO DIFF WBC: CPT

## 2023-02-23 PROCEDURE — 6360000002 HC RX W HCPCS: Performed by: STUDENT IN AN ORGANIZED HEALTH CARE EDUCATION/TRAINING PROGRAM

## 2023-02-23 PROCEDURE — 84145 PROCALCITONIN (PCT): CPT

## 2023-02-23 PROCEDURE — 80053 COMPREHEN METABOLIC PANEL: CPT

## 2023-02-23 PROCEDURE — 80074 ACUTE HEPATITIS PANEL: CPT

## 2023-02-23 PROCEDURE — 2060000000 HC ICU INTERMEDIATE R&B

## 2023-02-23 PROCEDURE — 87205 SMEAR GRAM STAIN: CPT

## 2023-02-23 PROCEDURE — 87070 CULTURE OTHR SPECIMN AEROBIC: CPT

## 2023-02-23 PROCEDURE — 87390 HIV-1 AG IA: CPT

## 2023-02-23 PROCEDURE — 36415 COLL VENOUS BLD VENIPUNCTURE: CPT

## 2023-02-23 PROCEDURE — 6370000000 HC RX 637 (ALT 250 FOR IP): Performed by: RADIOLOGY

## 2023-02-23 PROCEDURE — 6360000002 HC RX W HCPCS: Performed by: HOSPITALIST

## 2023-02-23 PROCEDURE — 0T913ZZ DRAINAGE OF LEFT KIDNEY, PERCUTANEOUS APPROACH: ICD-10-PCS | Performed by: RADIOLOGY

## 2023-02-23 PROCEDURE — 87075 CULTR BACTERIA EXCEPT BLOOD: CPT

## 2023-02-23 PROCEDURE — 94760 N-INVAS EAR/PLS OXIMETRY 1: CPT

## 2023-02-23 PROCEDURE — 99223 1ST HOSP IP/OBS HIGH 75: CPT | Performed by: INTERNAL MEDICINE

## 2023-02-23 PROCEDURE — 6360000002 HC RX W HCPCS: Performed by: RADIOLOGY

## 2023-02-23 RX ORDER — ACETAMINOPHEN 325 MG/1
650 TABLET ORAL EVERY 4 HOURS PRN
Status: DISCONTINUED | OUTPATIENT
Start: 2023-02-23 | End: 2023-02-26 | Stop reason: HOSPADM

## 2023-02-23 RX ORDER — MIDAZOLAM HYDROCHLORIDE 1 MG/ML
INJECTION INTRAMUSCULAR; INTRAVENOUS DAILY PRN
Status: COMPLETED | OUTPATIENT
Start: 2023-02-23 | End: 2023-02-23

## 2023-02-23 RX ORDER — AMLODIPINE BESYLATE 10 MG/1
10 TABLET ORAL DAILY
Status: DISCONTINUED | OUTPATIENT
Start: 2023-02-23 | End: 2023-02-26 | Stop reason: HOSPADM

## 2023-02-23 RX ORDER — FENTANYL CITRATE 50 UG/ML
INJECTION, SOLUTION INTRAMUSCULAR; INTRAVENOUS DAILY PRN
Status: COMPLETED | OUTPATIENT
Start: 2023-02-23 | End: 2023-02-23

## 2023-02-23 RX ORDER — CIPROFLOXACIN 2 MG/ML
400 INJECTION, SOLUTION INTRAVENOUS EVERY 12 HOURS
Status: DISCONTINUED | OUTPATIENT
Start: 2023-02-23 | End: 2023-02-26 | Stop reason: HOSPADM

## 2023-02-23 RX ORDER — POTASSIUM CHLORIDE 20 MEQ/1
40 TABLET, EXTENDED RELEASE ORAL ONCE
Status: COMPLETED | OUTPATIENT
Start: 2023-02-23 | End: 2023-02-23

## 2023-02-23 RX ORDER — DOCUSATE SODIUM 100 MG/1
100 CAPSULE, LIQUID FILLED ORAL DAILY PRN
Status: DISCONTINUED | OUTPATIENT
Start: 2023-02-23 | End: 2023-02-26 | Stop reason: HOSPADM

## 2023-02-23 RX ADMIN — AMLODIPINE BESYLATE 10 MG: 10 TABLET ORAL at 09:11

## 2023-02-23 RX ADMIN — PANTOPRAZOLE SODIUM 40 MG: 40 TABLET, DELAYED RELEASE ORAL at 06:05

## 2023-02-23 RX ADMIN — ACETAMINOPHEN 650 MG: 325 TABLET ORAL at 20:35

## 2023-02-23 RX ADMIN — OXYCODONE HYDROCHLORIDE 10 MG: 10 TABLET ORAL at 06:05

## 2023-02-23 RX ADMIN — SODIUM CHLORIDE: 9 INJECTION, SOLUTION INTRAVENOUS at 01:40

## 2023-02-23 RX ADMIN — DOCUSATE SODIUM 100 MG: 100 CAPSULE, LIQUID FILLED ORAL at 09:42

## 2023-02-23 RX ADMIN — FENTANYL CITRATE 50 MCG: 50 INJECTION INTRAMUSCULAR; INTRAVENOUS at 14:26

## 2023-02-23 RX ADMIN — CEFEPIME 1000 MG: 1 INJECTION, POWDER, FOR SOLUTION INTRAMUSCULAR; INTRAVENOUS at 09:17

## 2023-02-23 RX ADMIN — OXYCODONE HYDROCHLORIDE 10 MG: 10 TABLET ORAL at 15:40

## 2023-02-23 RX ADMIN — POTASSIUM CHLORIDE 40 MEQ: 1500 TABLET, EXTENDED RELEASE ORAL at 09:11

## 2023-02-23 RX ADMIN — CIPROFLOXACIN 400 MG: 2 INJECTION, SOLUTION INTRAVENOUS at 13:30

## 2023-02-23 RX ADMIN — MIDAZOLAM 1 MG: 1 INJECTION INTRAMUSCULAR; INTRAVENOUS at 14:18

## 2023-02-23 RX ADMIN — OXYCODONE HYDROCHLORIDE 10 MG: 10 TABLET ORAL at 20:30

## 2023-02-23 RX ADMIN — MORPHINE SULFATE 4 MG: 4 INJECTION, SOLUTION INTRAMUSCULAR; INTRAVENOUS at 09:11

## 2023-02-23 RX ADMIN — FENTANYL CITRATE 50 MCG: 50 INJECTION INTRAMUSCULAR; INTRAVENOUS at 14:17

## 2023-02-23 RX ADMIN — SODIUM CHLORIDE, PRESERVATIVE FREE 10 ML: 5 INJECTION INTRAVENOUS at 09:11

## 2023-02-23 RX ADMIN — IOPAMIDOL 75 ML: 755 INJECTION, SOLUTION INTRAVENOUS at 08:42

## 2023-02-23 RX ADMIN — CEFEPIME 1000 MG: 1 INJECTION, POWDER, FOR SOLUTION INTRAMUSCULAR; INTRAVENOUS at 20:29

## 2023-02-23 RX ADMIN — SODIUM CHLORIDE: 9 INJECTION, SOLUTION INTRAVENOUS at 09:20

## 2023-02-23 RX ADMIN — SODIUM CHLORIDE: 9 INJECTION, SOLUTION INTRAVENOUS at 20:42

## 2023-02-23 RX ADMIN — SODIUM CHLORIDE: 9 INJECTION, SOLUTION INTRAVENOUS at 13:28

## 2023-02-23 ASSESSMENT — PAIN SCALES - GENERAL
PAINLEVEL_OUTOF10: 9
PAINLEVEL_OUTOF10: 9
PAINLEVEL_OUTOF10: 6
PAINLEVEL_OUTOF10: 9
PAINLEVEL_OUTOF10: 4
PAINLEVEL_OUTOF10: 3
PAINLEVEL_OUTOF10: 5
PAINLEVEL_OUTOF10: 5
PAINLEVEL_OUTOF10: 7
PAINLEVEL_OUTOF10: 10

## 2023-02-23 ASSESSMENT — PAIN DESCRIPTION - DESCRIPTORS
DESCRIPTORS: ACHING;DISCOMFORT
DESCRIPTORS: ACHING;GNAWING
DESCRIPTORS: ACHING;DISCOMFORT

## 2023-02-23 ASSESSMENT — PAIN DESCRIPTION - LOCATION
LOCATION: ABDOMEN
LOCATION: FLANK
LOCATION: ABDOMEN;FLANK
LOCATION: ABDOMEN

## 2023-02-23 ASSESSMENT — PAIN DESCRIPTION - ORIENTATION
ORIENTATION: LOWER
ORIENTATION: LEFT
ORIENTATION: MID
ORIENTATION: LOWER
ORIENTATION: LEFT
ORIENTATION: LOWER

## 2023-02-23 ASSESSMENT — PAIN DESCRIPTION - PAIN TYPE: TYPE: ACUTE PAIN

## 2023-02-23 NOTE — CONSULTS
Infectious Diseases Inpatient Consult Note      Reason for Consult:  Sepsis, Left Pyelonephritis and Renal abscess     Requesting Physician: TERRA Murphy      Primary Care Physician:  No primary care provider on file. History Obtained From:  Epic AND Patient     CHIEF COMPLAINT:     Chief Complaint   Patient presents with    Fever     Patient came to ER with complaints of a fever. Patient symptoms began on Tuesday 2/14. Patient has been taking ibuprofen to relieve symptoms. Patient stated she has vomited today multiple times and has only drank water. Patients temperature on admission was 98. 3. HISTORY OF PRESENT ILLNESS:  51 y.o. woman with a history of anxiety, COPD, hypertension, drug use admitted to hospital secondary to left flank pain fever chills not feeling well. Admission labs indicate creatinine elevated 2.5 lactic acid at 2.6 procalcitonin mild elevation of 19 LFT normal, WBC elevated at 14.5 hemoglobin 13.5, urinalysis abnormal urine culture positive for E. coli sensitivities noted. Fever Tmax 103 since admission. Given ongoing flank pain she underwent repeat CT abdomen pelvis on 2/23 indicating left renal cortical collection consistent with microabscess with the largest abscess measuring about 2.8 cm in the left  midpole region. Interventional radiology consulted she underwent CT-guided aspiration of the left renal abscess. Culture in process. We are consulted for recommendations.   Location : Left flank pain, left abdominal pain     Quality : A darrius       Severity : 10/10  Duration : 3 days     Timing : Constant  Context : UTI  Modifying factors : None  Associated signs and symptoms: Fever, chills, nausea, vomiting        Past Medical History:    Past Medical History:   Diagnosis Date    Anxiety     Arthritis     h/o C6 fx, bulging disc on back    COPD (chronic obstructive pulmonary disease) ([facility])     [place], surgical     High blood pressure     Neck fracture ([facility]     Stroke [facility])     right facial droop sl drool    Substance abuse ([address] 10/11/2013       Past Surgical History:    Past Surgical History:   Procedure Laterality Date    ABDOMEN SURGERY      exploratory    ANTERIOR CRUCIATE LIGAMENT REPAIR Left 9/13/2019    LEFT KNEE ARTHROSCOPY, ANTERIOR CRUCIATE LIGAMENT RECONSTRUCTION WITH ACHILLES ALLOGRAFT; MEDIAL MENISCECTOMY REPAIR performed by Teri Tovar MD at [address] removed from face as a 6th grader       Current Medications:    No outpatient medications have been marked as taking for the 2/17/23 encounter [facility] Encounter). Allergies:  Tramadol    Immunizations : There is no immunization history on file for this patient.       Social History:     Social History     Tobacco Use    Smoking status: Every Day     Packs/day: 1.00     Years: 20.00     Pack years: 20.00     Types: Cigarettes    Smokeless tobacco: Never   Vaping Use    Vaping Use: Never used   Substance Use Topics    Alcohol use: No    Drug use: Yes     Types: Marijuana (Weed)     Social History     Tobacco Use   Smoking Status Every Day    Packs/day: 1.00    Years: 20.00    Pack years: 20.00    Types: Cigarettes   Smokeless Tobacco Never      Family History   Problem Relation Age of Onset    Heart Failure Mother         pace maker    COPD Mother     COPD Father     Heart Attack Father     Heart Disease Father         stents/bypass    Cancer Maternal Aunt         breast/lung    Arthritis Other     Diabetes Other     High Blood Pressure Other     Stroke Sister 27    Lupus Sister     Heart Disease Brother     Heart Attack Brother     COPD Maternal Grandmother     No Known Problems Maternal Grandfather     No Known Problems Paternal Grandmother     No Known Problems Paternal Grandfather     Heart Disease Sister     Breast Cancer Maternal Aunt           REVIEW OF SYSTEMS:      Constitutional:  r fevers++ , chills++, night sweats  Eyes:  negative for blurred vision, eye discharge, visual disturbance   HEENT:  negative for hearing loss, ear drainage,nasal congestion  Respiratory:  negative for cough, shortness of breath or hemoptysis   Cardiovascular:  negative for chest pain, palpitations, syncope  Gastrointestinal:  negative for nausea, vomiting, diarrhea, constipation, abdominal pain++ left flank pain  Genitourinary:  negative for frequency, dysuria, urinary incontinence, hematuria  Hematologic/Lymphatic:  negative for easy bruising, bleeding and lymphadenopathy  Allergic/Immunologic:  negative for recurrent infections, angioedema, anaphylaxis   Endocrine:  negative for weight changes, polyuria, polydipsia and polyphagia  Musculoskeletal:  negative for joint  pain, swelling, decreased range of motion  Integumentary: No rashes, skin lesions  Neurological:  negative for headaches, slurred speech, unilateral weakness  Psychiatric: negative for hallucinations,confusion,agitation.      PHYSICAL EXAM:      Vitals:  T max  103  BP (!) 138/96   Pulse 88   Temp 98.7 °F (37.1 °C) (Oral)   Resp 20   Ht 5' 4\" (1.626 m)   Wt 142 lb 10.2 oz (64.7 kg)   SpO2 100%   BMI 24.48 kg/m²     General Appearance: alert,in some acute distress++ pallor, no icterus   Skin: warm and dry, no rash or erythema  Head: normocephalic and atraumatic  Eyes: pupils equal, round, and reactive to light, conjunctivae normal  ENT: tympanic membrane, external ear and ear canal normal bilaterally, nose without deformity, nasal mucosa and turbinates normal without polyps  Neck: supple and non-tender without mass, no thyromegaly  no cervical lymphadenopathy  Pulmonary/Chest: clear to auscultation bilaterally- no wheezes, rales or rhonchi, normal air movement, no respiratory distress  Cardiovascular: normal rate, regular rhythm, normal S1 and S2, no murmurs, rubs, clicks, or gallops, no carotid bruits  Abdomen: soft, non-tender, non-distended, normal bowel sounds, no masses or organomegaly  Left flank pain + Extremities: no cyanosis, clubbing or edema  Musculoskeletal: normal range of motion, no joint swelling, deformity or tenderness  Integumentary: No rashes, no abnormal skin lesions, no petechiae  Neurologic: reflexes normal and symmetric, no cranial nerve deficit  Psych:  Orientation, sensorium, mood normal   Lines: IV    DATA:    CBC:   Lab Results   Component Value Date    WBC 13.2 (H) 02/23/2023    HGB 9.7 (L) 02/23/2023    HCT 28.7 (L) 02/23/2023    MCV 90.3 02/23/2023     02/23/2023     RENAL:   Lab Results   Component Value Date    CREATININE 1.1 02/23/2023    BUN 10 02/23/2023     (L) 02/23/2023    K 3.1 (L) 02/23/2023     02/23/2023    CO2 17 (L) 02/23/2023     SED RATE:   Lab Results   Component Value Date/Time    SEDRATE 8 07/23/2019 11:28 AM     CK:   Lab Results   Component Value Date/Time    CKTOTAL 84 11/05/2013 03:03 PM     CRP: No results found for: CRP  Hepatic Function Panel:   Lab Results   Component Value Date/Time    ALKPHOS 122 02/23/2023 06:03 AM    ALT 11 02/23/2023 06:03 AM    AST 15 02/23/2023 06:03 AM    PROT 4.8 02/23/2023 06:03 AM    PROT 6.9 09/13/2010 04:48 PM    BILITOT 0.6 02/23/2023 06:03 AM    LABALBU 2.2 02/23/2023 06:03 AM     UA:  Lab Results   Component Value Date/Time    COLORU Yellow 02/17/2023 03:00 PM    CLARITYU CLOUDY 02/17/2023 03:00 PM    GLUCOSEU Negative 02/17/2023 03:00 PM    BILIRUBINUR Negative 02/17/2023 03:00 PM    KETUA Negative 02/17/2023 03:00 PM    SPECGRAV 1.020 02/17/2023 03:00 PM    BLOODU LARGE 02/17/2023 03:00 PM    PHUR 5.5 02/17/2023 03:00 PM    PHUR 5.5 02/17/2023 03:00 PM    PROTEINU >=300 02/17/2023 03:00 PM    UROBILINOGEN 0.2 02/17/2023 03:00 PM    NITRU POSITIVE 02/17/2023 03:00 PM    LEUKOCYTESUR SMALL 02/17/2023 03:00 PM    LABMICR YES 02/17/2023 03:00 PM    URINETYPE Voided 02/17/2023 03:00 PM      Urine Microscopic:   Lab Results   Component Value Date/Time    BACTERIA 2+ 02/17/2023 03:00 PM    WBCUA  02/17/2023 03:00 PM    RBCUA  02/17/2023 03:00 PM    EPIU 21-50 02/17/2023 03:00 PM     Urine Reflex to Culture:   Lab Results   Component Value Date/Time    URRFLXCULT Yes 02/17/2023 03:00 PM       Creat  2.5   Lactic acid  2.5     Procal   19.58 down to   8.68     MICRO: cultures reviewed and updated by me       Blood Culture:   Lab Results   Component Value Date/Time    BC No Growth after 4 days of incubation. 02/17/2023 02:45 PM    BLOODCULT2 No Growth after 4 days of incubation. 02/17/2023 04:50 PM     /23 1445       Order Status: Completed Specimen: Blood Updated: 02/21/23 2215    Blood Culture, Routine No Growth after 4 days of incubation. Narrative:     ORDER#: [id number]                          ORDERED BY: Rajat Lombardi   SOURCE: Blood Antecubital-Rig              COLLECTED:  02/17/23 14:45   ANTIBIOTICS AT KATHRINE.:                      RECEIVED :  02/17/23 21:31   If child <=2 yrs old please draw pediatric bottle. ~Blood Culture 1   Culture, Blood 2 [[id number]] Collected: 02/17/23 1650   Order Status: Completed Specimen: Blood Updated: 02/21/23 2215    Culture, Blood 2 No Growth after 4 days of incubation. Narrative:     ORDER#: [id number]                          ORDERED BY: Rajat Lombardi   SOURCE: Blood                              COLLECTED:  02/17/23 16:50   ANTIBIOTICS AT KATHRINE.:                      RECEIVED :  02/17/23 21:31   If child <=2 yrs old please draw pediatric bottle. ~Blood Culture #2   Culture, Urine [[id number]] (Abnormal)  Collected: 02/17/23 1500   Order Status: Completed Specimen: Urine, clean catch Updated: 02/20/23 0812    Organism Escherichia coli Abnormal     Urine Culture, Routine >100,000 CFU/ml   Narrative:     ORDER#: [id number]                          ORDERED BY: Rajat Lombardi   SOURCE: Urine Clean Catch                  COLLECTED:  02/17/23 15:00   ANTIBIOTICS AT KATHRINE.:                      RECEIVED :  02/17/23 21:32   COVID-19, Rapid [[id number]] Collected: 02/17/23 1445 Order Status: Completed Specimen: Nasopharyngeal Swab Updated: 02/17/23 1524    SARS-CoV-2, NAAT Not Detected    Comment: Rapid NAAT:   Negative results should be treated as presumptive and,   if inconsistent with clinical signs and symptoms or necessary for   patient management, should be tested with an alternative molecular   assay. Negative results do not preclude SARS-CoV-2 infection and   should not be used as the sole basis for patient management decisions. This test has been authorized by the FDA under an Emergency Use   Authorization (EUA) for use by authorized laboratories. Fact sheet for Healthcare Providers:   http://www.ligia-nathalie.oneyda/   Fact sheet for Patients: http://www.ligia-nathalie.oneyda/     METHODOLOGY: Isothermal Nucleic Acid Amplification       Culture, Blood 1 [[id number]] Collected: 02/17/23 0000   Order Status: Canceled Specimen: Blood    Culture, Blood 2 [[id number]] Collected: 02/17/23 0000   Order Status: Canceled Specimen: Blood      Susceptibility    Escherichia coli (1)    Antibiotic Interpretation Microscan  Method Status    ampicillin Resistant >=32 mcg/mL BACTERIAL SUSCEPTIBILITY PANEL BY ARI     ampicillin-sulbactam Sensitive 4 mcg/mL BACTERIAL SUSCEPTIBILITY PANEL BY ARI     ceFAZolin Sensitive <=4 mcg/mL BACTERIAL SUSCEPTIBILITY PANEL BY ARI      NOTE: Cefazolin should only be used for uncomplicated UTI         for E.coli or Klebsiella pneumoniae.         cefepime Sensitive <=0.12 mcg/mL BACTERIAL SUSCEPTIBILITY PANEL BY ARI     cefTRIAXone Sensitive <=0.25 mcg/mL BACTERIAL SUSCEPTIBILITY PANEL BY ARI     ciprofloxacin Sensitive <=0.25 mcg/mL BACTERIAL SUSCEPTIBILITY PANEL BY ARI     ertapenem Sensitive <=0.12 mcg/mL BACTERIAL SUSCEPTIBILITY PANEL BY ARI     gentamicin Sensitive <=1 mcg/mL BACTERIAL SUSCEPTIBILITY PANEL BY ARI     levofloxacin Sensitive <=0.12 mcg/mL BACTERIAL SUSCEPTIBILITY PANEL BY ARI     nitrofurantoin Sensitive <=16 mcg/mL BACTERIAL SUSCEPTIBILITY PANEL BY ARI     piperacillin-tazobactam Sensitive <=4 mcg/mL BACTERIAL SUSCEPTIBILITY PANEL BY ARI     trimethoprim-sulfamethoxazole Resistant >=320 mcg/mL BACTERIAL SUSCEPTIBILITY PANEL BY ARI        Narrative    Viral Culture:    Lab Results   Component Value Date/Time    COVID19 Not Detected 02/17/2023 02:45 PM     Urine Culture: No results for input(s): Dylan Davis in the last 72 hours. Scheduled Meds:   amLODIPine  10 mg Oral Daily    ciprofloxacin  400 mg IntraVENous Q12H    pantoprazole  40 mg Oral QAM AC    enoxaparin  40 mg SubCUTAneous Daily    scopolamine  1 patch TransDERmal Q72H    sodium chloride flush  5-40 mL IntraVENous 2 times per day    cefepime  1,000 mg IntraVENous Q12H    nicotine  1 patch TransDERmal Daily       Continuous Infusions:   sodium chloride Stopped (02/22/23 1158)    sodium chloride 150 mL/hr at 02/23/23 0920       PRN Meds:  docusate sodium, polyethylene glycol, calcium carbonate, hydrALAZINE, oxyCODONE, morphine, ondansetron, sodium chloride flush, sodium chloride, acetaminophen **OR** acetaminophen    Imaging:   CT ABDOMEN PELVIS W IV CONTRAST Additional Contrast? None   Final Result   Multiple small low-attenuation left renal cortical collections are most   consistent with microabscesses. The largest of these measures 2.8 cm and is   located in the mid polar region of the left kidney. No hydronephrosis. Increasing right paracolic gutter and pelvic free fluid. Persistent anasarca with small dependent pleural effusions. Aortoiliac atherosclerosis. CT ABDOMEN PELVIS WO CONTRAST Additional Contrast? None   Final Result   Heterogeneous appearance to the left kidney with perinephric fat stranding,   compatible with given history of pyelonephritis. An ill-defined area of   hypodensity is seen in left kidney, similar to prior.   This area could   represent a pre-existing cyst or and early abscess-phlegmon      Scattered small retroperitoneal nodes are seen, likely reactive      Findings of fluid overload, increased compared to prior, denoted by small   pleural effusions, body wall anasarca, and mild increased abdominal and   pelvic ascites. CT ABDOMEN PELVIS WO CONTRAST Additional Contrast? None   Final Result   1. Left renal cortical thinning and adjacent perinephric fat stranding with   no hydronephrosis or evident renal stone. The findings are suspicious for   pyelonephritis. Correlation with urinalysis is suggested. 2. No acute findings elsewhere in the abdomen or pelvis. 3. Age advanced atherosclerosis of the abdominal aorta and its branches. 4. Hepatomegaly. 5. Grade 1 anterolisthesis of L5 on S1 related to bilateral L5 spondylolysis   and advanced degenerative disc disease at L5-S1. XR CHEST PORTABLE   Final Result   No radiographic evidence of an acute cardiopulmonary process. IR ABSCESS DRAINAGE PERC    (Results Pending)       All pertinent images and reports for the current Hospitalization were reviewed by me. IMPRESSION:    Patient Active Problem List   Diagnosis    Acromioclavicular joint arthritis    Rotator cuff tendonitis    Mood disorder (HCC)    Substance abuse ([address]    Suicidal ideation    Left anterior cruciate ligament tear    Tear of meniscus of knee joint    Sepsis ([address]    Pyelonephritis    Acute kidney injury ([address]     Sepsis  Complicated urinary tract infection  Blood pyelonephritis  Left renal abscess  E. coli urinary tract infection  Lactic acidosis  WBC elevation  Fever and chills  COPD  Smoking  History of drug use  Status post IR guided aspiration of the left renal abscess. Abscess culture requested in process. Blood cultures remain negative but has ongoing left flank pain with a left pyelonephritis complicated with a left renal abscess. Status post IR guided aspiration. Abscess culture in process. Tolerating antibiotic therapy okay.   E. coli sensitivities reviewed. Agree with current antibiotic choices. Labs, Microbiology, Radiology and pertinent results from current hospitalization and care every where were reviewed by me as a part of the consultation. PLAN :  1.  Continue IV cefepime 2 g every 8 - ( DOSE WAS LOW for complicated UTI and abscess )   2. Continue ciprofloxacin 400 mg Q 12 hrs  3. IR abscess cx requested in process  4. Check HIV screen  5. ESR, CRP  6. PROCAL  7. NO renal STONES     Discussed with patient/Family and Nursing   Risk of Complications/Morbidity: High      Illness(es)/ Infection present that pose threat to bodily function. There is potential for severe exacerbation of infection/side effects of treatment. Therapy requires intensive monitoring for antimicrobial agent toxicity. Thanks for allowing me to participate in your patient's care please call me with any questions or concerns.     Dr. Swathi Heredia MD  [address] [place] [address] Physician  Phone: [phone]   Fax : [phone]

## 2023-02-23 NOTE — PRE SEDATION
Sedation Pre-Procedure Note    Patient Name: Garret Murry   YOB: 1975  Room/Bed: V8B-2247/5262-01  Medical Record Number: [id number]  Date: 2/23/2023   Time: 2:40 PM       Indication:  Left kidney abscess aspiration. Consent: I have discussed with the patient and/or the patient representative the indication, alternatives, and the possible risks and/or complications of the planned procedure and the anesthesia methods. The patient and/or patient representative appear to understand and agree to proceed. Vital Signs:   Vitals:    02/23/23 1439   BP: (!) 150/101   Pulse: (!) 111   Resp: 20   Temp:    SpO2: 98%       Past Medical History:   has a past medical history of Anxiety, Arthritis, COPD (chronic obstructive pulmonary disease) ([facility] [address] [place], surgical, High blood pressure, Neck fracture ([facility] [address] Stroke ([facility] [address] and Substance abuse ([facility] [address] Past Surgical History:   has a past surgical history that includes Abdomen surgery; other surgical history; and Anterior cruciate ligament repair (Left, 9/13/2019). Medications:   Scheduled Meds:    amLODIPine  10 mg Oral Daily    ciprofloxacin  400 mg IntraVENous Q12H    pantoprazole  40 mg Oral QAM AC    enoxaparin  40 mg SubCUTAneous Daily    scopolamine  1 patch TransDERmal Q72H    sodium chloride flush  5-40 mL IntraVENous 2 times per day    cefepime  1,000 mg IntraVENous Q12H    nicotine  1 patch TransDERmal Daily     Continuous Infusions:    sodium chloride 25 mL/hr at 02/23/23 1328    sodium chloride 150 mL/hr at 02/23/23 0920     PRN Meds: docusate sodium, fentanNYL, midazolam, polyethylene glycol, calcium carbonate, hydrALAZINE, oxyCODONE, morphine, ondansetron, sodium chloride flush, sodium chloride, acetaminophen **OR** acetaminophen  Home Meds:   Prior to Admission medications    Medication Sig Start Date End Date Taking?  Authorizing Provider   ibuprofen (ADVIL;MOTRIN) 800 MG tablet Take 1 tablet by mouth 3 times daily (with meals) 11/22/22 Herminio Isidro II, MD     Coumadin Use Last 7 Days:  no  Antiplatelet drug therapy use last 7 days: no  Other anticoagulant use last 7 days: no  Additional Medication Information:  n/a      Pre-Sedation Documentation and Exam:   I have reviewed the patient's history and review of systems.     Mallampati Airway Assessment:  Mallampati Class II - (soft palate, fauces & uvula are visible)    Prior History of Anesthesia Complications:   none    ASA Classification:  Class 2 - A normal healthy patient with mild systemic disease    Sedation/ Anesthesia Plan:   intravenous sedation    Medications Planned:   midazolam (Versed) intravenously and fentanyl intravenously    Patient is an appropriate candidate for plan of sedation: yes    Electronically signed by Nery Knight MD on 2/23/2023 at 2:40 PM

## 2023-02-23 NOTE — PROGRESS NOTES
Pt Name: Curtis Saul  Medical Record Number: [id number]  Date of Birth 1975   Today's Date: 2/23/2023      Subjective:  Awake in bed, stating she doesn't recall seeing me at all this week. Angry with me for not allowing her to eat today, explained needed CT with contrast and reviewed to see if needed abscess drained due to increasing WBC and continued fevers. ROS: Constitutional: low grade fevers    Vitals:  Vitals:    02/23/23 0935 02/23/23 0941 02/23/23 0947 02/23/23 1245   BP:    (!) 150/100   Pulse:   88 90   Resp:  20  20   Temp:       TempSrc:       SpO2: 100%      Weight:       Height:         I/O last 3 completed shifts:   In: 7152.9 [P.O.:3040; I.V.:3432.8; IV Piggyback:680.1]  Out: -     Exam:  General: Awake, oriented, no acute distress  Respiratory: Nonlabored breathing  Abdomen: Soft, left flank-tender, non-distended, no masses  : spontaneously voiding  Skin: Skin color, texture, turgor normal, no rashes or lesions  Neurologic: no gross deficits    CURRENT MEDICATIONS   Scheduled Meds:   amLODIPine  10 mg Oral Daily    ciprofloxacin  400 mg IntraVENous Q12H    pantoprazole  40 mg Oral QAM AC    enoxaparin  40 mg SubCUTAneous Daily    scopolamine  1 patch TransDERmal Q72H    sodium chloride flush  5-40 mL IntraVENous 2 times per day    cefepime  1,000 mg IntraVENous Q12H    nicotine  1 patch TransDERmal Daily     Continuous Infusions:   sodium chloride Stopped (02/22/23 1158)    sodium chloride 150 mL/hr at 02/23/23 0920     PRN Meds:.docusate sodium, polyethylene glycol, calcium carbonate, hydrALAZINE, oxyCODONE, morphine, ondansetron, sodium chloride flush, sodium chloride, acetaminophen **OR** acetaminophen    LABS     Recent Labs     02/21/23  0548 02/22/23  0939 02/22/23  0940 02/23/23  0603   WBC 10.5  --  12.1* 13.2*   HGB 10.8*  --  10.0* 9.7*   HCT 31.6*  --  30.3* 28.7*     --  217 246   * 133*  --  135*   K 3.4* 3.4*  --  3.1*    107  --  107   CO2 16* 17*  --  17*   BUN 15 14  --  10   CREATININE 1.3* 1.1  --  1.1   CALCIUM 7.5* 7.2*  --  7.1*   AST  --   --   --  15   ALT  --   --   --  11   BILITOT  --   --   --  0.6     CT abdomen/pelvis w/o contrast 2/17/23  Impression   1. Left renal cortical thinning and adjacent perinephric fat stranding with   no hydronephrosis or evident renal stone.  The findings are suspicious for   pyelonephritis.  Correlation with urinalysis is suggested.   2. No acute findings elsewhere in the abdomen or pelvis.   3. Age advanced atherosclerosis of the abdominal aorta and its branches.   4. Hepatomegaly.   5. Grade 1 anterolisthesis of L5 on S1 related to bilateral L5 spondylolysis   and advanced degenerative disc disease at L5-S1.      CT abdomen/pelvis w/o contrast 2/21/23  Impression   Heterogeneous appearance to the left kidney with perinephric fat stranding,   compatible with given history of pyelonephritis.  An ill-defined area of   hypodensity is seen in left kidney, similar to prior.  This area could   represent a pre-existing cyst or and early abscess-phlegmon       Scattered small retroperitoneal nodes are seen, likely reactive       Findings of fluid overload, increased compared to prior, denoted by small   pleural effusions, body wall anasarca, and mild increased abdominal and   pelvic ascites.     CT abdomen pelvis with contrast 2/23/23  Impression   Multiple small low-attenuation left renal cortical collections are most   consistent with microabscesses.  The largest of these measures 2.8 cm and is   located in the mid polar region of the left kidney.  No hydronephrosis.       Increasing right paracolic gutter and pelvic free fluid.       Persistent anasarca with small dependent pleural effusions.       Aortoiliac atherosclerosis.       Blood cultures no growth  Urine culture E. Coli    ASSESSMENT   Hospital day # 6  48y.o. female with left pyelonephritis, small abscess.  Repeat CT with contrast today with 2.8cm abscess  and multiple smaller abscesses in left kidney. Discussed with IR and will drain this larger abscess today.    PLAN   Continue antibiotics, ID consult placed  Will go to IR to drain 2.8cm abscess in left kidney today, keep npo  Ok to resume diet upon return from [facility], APRN - CNP 2/23/2023 12:59 PM

## 2023-02-23 NOTE — PLAN OF CARE
Problem: Discharge Planning  Goal: Discharge to home or other facility with appropriate resources  2/22/2023 2128 by Victor Manuel Rose RN  Outcome: Progressing     Problem: Pain  Goal: Verbalizes/displays adequate comfort level or baseline comfort level  2/22/2023 2128 by Victor Manuel Rose RN  Outcome: Progressing     Problem: Chronic Conditions and Co-morbidities  Goal: Patient's chronic conditions and co-morbidity symptoms are monitored and maintained or improved  2/22/2023 2128 by Victor Manuel Rose RN  Outcome: Progressing     Problem: Cardiovascular - Adult  Goal: Maintains optimal cardiac output and hemodynamic stability  2/22/2023 2128 by Victor Manuel Rose RN  Outcome: Progressing     Problem: Cardiovascular - Adult  Goal: Absence of cardiac dysrhythmias or at baseline  2/22/2023 2128 by Victor Manuel Rose RN  Outcome: Progressing     Problem: Gastrointestinal - Adult  Goal: Minimal or absence of nausea and vomiting  2/22/2023 2128 by Victor Manuel Rose RN  Outcome: Progressing     Problem: Infection - Adult  Goal: Absence of infection at discharge  2/22/2023 2128 by Victor Manuel Rose RN  Outcome: Progressing     Problem: Infection - Adult  Goal: Absence of infection during hospitalization  2/22/2023 2128 by Victor Manuel Rose RN  Outcome: Progressing     Problem: Infection - Adult  Goal: Absence of fever/infection during anticipated neutropenic period  2/22/2023 2128 by Victor Manuel Rose RN  Outcome: Progressing     Problem: Metabolic/Fluid and Electrolytes - Adult  Goal: Electrolytes maintained within normal limits  2/22/2023 2128 by Victor Manuel Rose RN  Outcome: Progressing

## 2023-02-23 NOTE — CARE COORDINATION
Met with patient. Discussed discharge plan. Patient confirms she'll return home with friend's support. Denies home needs. Will continue to follow, but no anticipated dc needs.     Electronically signed by Loreto Morales RN Case Management on 2/23/2023 at 10:03 AM

## 2023-02-23 NOTE — PROGRESS NOTES
Patient complaining of abdominal pain. Abdomen tender. Patient states she does not want any pain or nausea medication, stating \"I want to take the least amount of medication possible. \" Heat packs given to patient to apply to abdomen.

## 2023-02-23 NOTE — PROGRESS NOTES
Hospitalist Progress Note      PCP: No primary care provider on file. Date of Admission: 2/17/2023    Chief Complaint: fever, abdominal pain. Hospital Course:    51 y.o. female with PMHx of Anxiety, COPD, HTN and substance abuse presented to [facility] in transfer from [facility] for management of sepsis with pyelonephritis. Pt presented to [facility] ED with fever and lower abdominal pain present for several day.  + nausea and vomiting yesterday. No urinary symptoms. + Suprapubic abdominal pain,     Admitted with pyelonephritis. Subjective:   Complain of flank pain   Started on stool softener  Has small kidney abscess not enough to drain per urology   Continues to report abdominal pain. Medications:  Reviewed    Infusion Medications    sodium chloride Stopped (02/22/23 1158)    sodium chloride 150 mL/hr at 02/23/23 0140     Scheduled Medications    potassium chloride  40 mEq Oral Once    ciprofloxacin  400 mg IntraVENous Q12H    pantoprazole  40 mg Oral QAM AC    enoxaparin  40 mg SubCUTAneous Daily    scopolamine  1 patch TransDERmal Q72H    sodium chloride flush  5-40 mL IntraVENous 2 times per day    cefepime  1,000 mg IntraVENous Q12H    nicotine  1 patch TransDERmal Daily     PRN Meds: docusate sodium, polyethylene glycol, calcium carbonate, hydrALAZINE, oxyCODONE, morphine, ondansetron, sodium chloride flush, sodium chloride, acetaminophen **OR** acetaminophen      Intake/Output Summary (Last 24 hours) at 2/23/2023 0836  Last data filed at 2/22/2023 2334  Gross per 24 hour   Intake 4834.39 ml   Output --   Net 4834.39 ml         Physical Exam Performed:    BP (!) 168/103   Pulse (!) 119   Temp 99.2 °F (37.3 °C) (Oral)   Resp 24   Ht 5' 4\" (1.626 m)   Wt 142 lb 10.2 oz (64.7 kg)   SpO2 97%   BMI 24.48 kg/m²     General appearance: No apparent distress, appears stated age and cooperative. HEENT: Pupils equal, round, and reactive to light. Conjunctivae/corneas clear.   Neck: Supple, with full range of motion. No jugular venous distention. Trachea midline. Respiratory:  Normal respiratory effort. Clear to auscultation, bilaterally without Rales/Wheezes/Rhonchi. Cardiovascular: Regular rate and rhythm with normal S1/S2 without murmurs, rubs or gallops. Abdomen: abdominal tenderness present. Musculoskeletal: No clubbing, cyanosis or edema bilaterally. Full range of motion without deformity. Skin: Skin color, texture, turgor normal.  No rashes or lesions. Neurologic:  Neurovascularly intact without any focal sensory/motor deficits. Cranial nerves: II-XII intact, grossly non-focal.  Psychiatric: Alert and oriented, thought content appropriate, normal insight  Capillary Refill: Brisk, 3 seconds, normal   Peripheral Pulses: +2 palpable, equal bilaterally       Labs:   Recent Labs     02/21/23  0548 02/22/23  0940 02/23/23  0603   WBC 10.5 12.1* 13.2*   HGB 10.8* 10.0* 9.7*   HCT 31.6* 30.3* 28.7*    217 246       Recent Labs     02/21/23  0548 02/22/23  0939 02/23/23  0603   * 133* 135*   K 3.4* 3.4* 3.1*    107 107   CO2 16* 17* 17*   BUN 15 14 10   CREATININE 1.3* 1.1 1.1   CALCIUM 7.5* 7.2* 7.1*       Recent Labs     02/23/23  0603   AST 15   ALT 11   BILITOT 0.6   ALKPHOS 122       No results for input(s): INR in the last 72 hours. No results for input(s): [place] Net in the last 72 hours. Urinalysis:      Lab Results   Component Value Date/Time    NITRU POSITIVE 02/17/2023 03:00 PM    WBCUA  02/17/2023 03:00 PM    BACTERIA 2+ 02/17/2023 03:00 PM    RBCUA  02/17/2023 03:00 PM    BLOODU LARGE 02/17/2023 03:00 PM    SPECGRAV 1.020 02/17/2023 03:00 PM    GLUCOSEU Negative 02/17/2023 03:00 PM       Radiology:  CT ABDOMEN PELVIS WO CONTRAST Additional Contrast? None   Final Result   Heterogeneous appearance to the left kidney with perinephric fat stranding,   compatible with given history of pyelonephritis.   An ill-defined area of hypodensity is seen in left kidney, similar to prior. This area could   represent a pre-existing cyst or and early abscess-phlegmon      Scattered small retroperitoneal nodes are seen, likely reactive      Findings of fluid overload, increased compared to prior, denoted by small   pleural effusions, body wall anasarca, and mild increased abdominal and   pelvic ascites. CT ABDOMEN PELVIS WO CONTRAST Additional Contrast? None   Final Result   1. Left renal cortical thinning and adjacent perinephric fat stranding with   no hydronephrosis or evident renal stone. The findings are suspicious for   pyelonephritis. Correlation with urinalysis is suggested. 2. No acute findings elsewhere in the abdomen or pelvis. 3. Age advanced atherosclerosis of the abdominal aorta and its branches. 4. Hepatomegaly. 5. Grade 1 anterolisthesis of L5 on S1 related to bilateral L5 spondylolysis   and advanced degenerative disc disease at L5-S1. XR CHEST PORTABLE   Final Result   No radiographic evidence of an acute cardiopulmonary process. CT ABDOMEN PELVIS W IV CONTRAST Additional Contrast? None    (Results Pending)       IP CONSULT TO UROLOGY    Assessment/Plan:    Active Hospital Problems    Diagnosis     Sepsis ([address] [A41.9]      Priority: Medium    Pyelonephritis [N12]      Priority: Medium    Acute kidney injury ([address] [N17.9]      Priority: Medium     Sepsis ([address] on admission due to Acute Pyelonephritis  -patient presented with Tachycardia,  Neutrophilic Leukocytosis and infection, nitial Lactic Acid 2.4 > 1.7.   -Ct abd/pelv: Left renal cortical thinning and adjacent perinephric fat stranding, concerning for  pyelonephritis. Urine culture positive for sensitive Ecoli. Patient continues to spike fever after 4 days of AB, repeated CT 2/21 with ? Small cyst vs abscess. Has small kidney abscess not enough to drain per urology     Plan   - Urine  sensitive E.  Coli, will add cipro to cefipime given continued fever.   -consult urology for possible abscess. - Hydrate with IVF  Currently made NPO by urology  Still wbc get worse       Acute kidney injury -   the etiology is unclear but considerations include dehydration, hypotension, nephrotoxic medications, and ATN  - crt baseline 0.8,  today 2.5--> 1.9--> 1.5--> 1.3  - IVF  - Avoid nephrotoxins  Today serum creatinine 1.1  Replace hypokalemia     Uncontrolled pain. Continue prn oxycodone and as need iv morphine     COPD (chronic obstructive pulmonary disease)   - without acute exacerbation.   - Nebulizer treatments as needed. HTN  Start on oral amlodipine 10 mg daily   - on iv hydralazine iv as needed        Anxiety  - supportive therapy  - resume    Hypokalemia. Replaced. DVT Prophylaxis: lovenox  Diet: Diet NPO  Code Status: Full Code  PT/OT Eval Status: baseline .      Dispo -pending clinical improvement      Nu Gutierres MD

## 2023-02-23 NOTE — BRIEF OP NOTE
Brief Postoperative Note    Garret Murry  YOB: 1975  [id number]    Pre-operative Diagnosis: Left kidney abscess    Post-operative Diagnosis: Same    Procedure: CT-guided aspiration of left kidney abscess    Anesthesia: Moderate Sedation    Surgeons: Brigid Bennett MD    Estimated Blood Loss: Less than 5 mL    Complications: None    Specimens: Was Obtained: 3cc bloody purulent fluid    Findings: Successful CT-guided aspiration of left kidney abscess.     Electronically signed by Brigid Bennett MD on 2/23/2023 at 2:40 PM

## 2023-02-24 PROBLEM — R50.9 FEVER AND CHILLS: Status: ACTIVE | Noted: 2023-02-24

## 2023-02-24 PROBLEM — A49.8 E COLI INFECTION: Status: ACTIVE | Noted: 2023-02-24

## 2023-02-24 PROBLEM — R79.89 ELEVATED PROCALCITONIN: Status: ACTIVE | Noted: 2023-02-24

## 2023-02-24 PROBLEM — E87.20 LACTIC ACID ACIDOSIS: Status: ACTIVE | Noted: 2023-02-24

## 2023-02-24 PROBLEM — A41.9 SEPTICEMIA (HCC): Status: ACTIVE | Noted: 2023-02-24

## 2023-02-24 PROBLEM — D72.9 NEUTROPHILIA: Status: ACTIVE | Noted: 2023-02-24

## 2023-02-24 PROBLEM — N15.1 RENAL ABSCESS, LEFT: Status: ACTIVE | Noted: 2023-02-24

## 2023-02-24 LAB
A/G RATIO: 0.9 (ref 1.1–2.2)
ALBUMIN SERPL-MCNC: 2.3 G/DL (ref 3.4–5)
ALP BLD-CCNC: 112 U/L (ref 40–129)
ALT SERPL-CCNC: 12 U/L (ref 10–40)
ANION GAP SERPL CALCULATED.3IONS-SCNC: 9 MMOL/L (ref 3–16)
AST SERPL-CCNC: 16 U/L (ref 15–37)
BASOPHILS ABSOLUTE: 0 K/UL (ref 0–0.2)
BASOPHILS RELATIVE PERCENT: 0.2 %
BILIRUB SERPL-MCNC: 0.5 MG/DL (ref 0–1)
BUN BLDV-MCNC: 8 MG/DL (ref 7–20)
CALCIUM SERPL-MCNC: 7 MG/DL (ref 8.3–10.6)
CHLORIDE BLD-SCNC: 106 MMOL/L (ref 99–110)
CO2: 20 MMOL/L (ref 21–32)
CREAT SERPL-MCNC: 1 MG/DL (ref 0.6–1.1)
EOSINOPHILS ABSOLUTE: 0 K/UL (ref 0–0.6)
EOSINOPHILS RELATIVE PERCENT: 0.4 %
GFR SERPL CREATININE-BSD FRML MDRD: >60 ML/MIN/{1.73_M2}
GLUCOSE BLD-MCNC: 115 MG/DL (ref 70–99)
HCT VFR BLD CALC: 29.2 % (ref 36–48)
HEMOGLOBIN: 9.7 G/DL (ref 12–16)
INR BLD: 1.29 (ref 0.87–1.14)
LYMPHOCYTES ABSOLUTE: 0.8 K/UL (ref 1–5.1)
LYMPHOCYTES RELATIVE PERCENT: 7.1 %
MCH RBC QN AUTO: 30.6 PG (ref 26–34)
MCHC RBC AUTO-ENTMCNC: 33.3 G/DL (ref 31–36)
MCV RBC AUTO: 91.9 FL (ref 80–100)
MONOCYTES ABSOLUTE: 0.7 K/UL (ref 0–1.3)
MONOCYTES RELATIVE PERCENT: 6.9 %
NEUTROPHILS ABSOLUTE: 9.2 K/UL (ref 1.7–7.7)
NEUTROPHILS RELATIVE PERCENT: 85.4 %
PDW BLD-RTO: 14.4 % (ref 12.4–15.4)
PLATELET # BLD: 274 K/UL (ref 135–450)
PMV BLD AUTO: 8.1 FL (ref 5–10.5)
POTASSIUM SERPL-SCNC: 3.5 MMOL/L (ref 3.5–5.1)
PROTHROMBIN TIME: 16.1 SEC (ref 11.7–14.5)
RBC # BLD: 3.18 M/UL (ref 4–5.2)
SODIUM BLD-SCNC: 135 MMOL/L (ref 136–145)
TOTAL PROTEIN: 5 G/DL (ref 6.4–8.2)
WBC # BLD: 10.8 K/UL (ref 4–11)

## 2023-02-24 PROCEDURE — 2580000003 HC RX 258: Performed by: HOSPITALIST

## 2023-02-24 PROCEDURE — 6370000000 HC RX 637 (ALT 250 FOR IP): Performed by: NURSE PRACTITIONER

## 2023-02-24 PROCEDURE — 6370000000 HC RX 637 (ALT 250 FOR IP): Performed by: STUDENT IN AN ORGANIZED HEALTH CARE EDUCATION/TRAINING PROGRAM

## 2023-02-24 PROCEDURE — 94760 N-INVAS EAR/PLS OXIMETRY 1: CPT

## 2023-02-24 PROCEDURE — 80053 COMPREHEN METABOLIC PANEL: CPT

## 2023-02-24 PROCEDURE — 2580000003 HC RX 258: Performed by: INTERNAL MEDICINE

## 2023-02-24 PROCEDURE — 6360000002 HC RX W HCPCS: Performed by: INTERNAL MEDICINE

## 2023-02-24 PROCEDURE — 2060000000 HC ICU INTERMEDIATE R&B

## 2023-02-24 PROCEDURE — 6360000002 HC RX W HCPCS: Performed by: HOSPITALIST

## 2023-02-24 PROCEDURE — 36415 COLL VENOUS BLD VENIPUNCTURE: CPT

## 2023-02-24 PROCEDURE — 6370000000 HC RX 637 (ALT 250 FOR IP): Performed by: RADIOLOGY

## 2023-02-24 PROCEDURE — 85610 PROTHROMBIN TIME: CPT

## 2023-02-24 PROCEDURE — 6370000000 HC RX 637 (ALT 250 FOR IP): Performed by: HOSPITALIST

## 2023-02-24 PROCEDURE — 6360000002 HC RX W HCPCS: Performed by: STUDENT IN AN ORGANIZED HEALTH CARE EDUCATION/TRAINING PROGRAM

## 2023-02-24 PROCEDURE — 85025 COMPLETE CBC W/AUTO DIFF WBC: CPT

## 2023-02-24 PROCEDURE — 99233 SBSQ HOSP IP/OBS HIGH 50: CPT | Performed by: INTERNAL MEDICINE

## 2023-02-24 RX ADMIN — SODIUM CHLORIDE: 9 INJECTION, SOLUTION INTRAVENOUS at 11:55

## 2023-02-24 RX ADMIN — ONDANSETRON 4 MG: 2 INJECTION INTRAMUSCULAR; INTRAVENOUS at 03:27

## 2023-02-24 RX ADMIN — PANTOPRAZOLE SODIUM 40 MG: 40 TABLET, DELAYED RELEASE ORAL at 06:20

## 2023-02-24 RX ADMIN — OXYCODONE HYDROCHLORIDE 10 MG: 10 TABLET ORAL at 11:54

## 2023-02-24 RX ADMIN — SODIUM CHLORIDE, PRESERVATIVE FREE 10 ML: 5 INJECTION INTRAVENOUS at 20:29

## 2023-02-24 RX ADMIN — CEFEPIME 2000 MG: 2 INJECTION, POWDER, FOR SOLUTION INTRAVENOUS at 04:47

## 2023-02-24 RX ADMIN — AMLODIPINE BESYLATE 10 MG: 10 TABLET ORAL at 07:21

## 2023-02-24 RX ADMIN — OXYCODONE HYDROCHLORIDE 10 MG: 10 TABLET ORAL at 07:21

## 2023-02-24 RX ADMIN — OXYCODONE HYDROCHLORIDE 10 MG: 10 TABLET ORAL at 03:19

## 2023-02-24 RX ADMIN — CIPROFLOXACIN 400 MG: 2 INJECTION, SOLUTION INTRAVENOUS at 16:13

## 2023-02-24 RX ADMIN — CEFEPIME 2000 MG: 2 INJECTION, POWDER, FOR SOLUTION INTRAVENOUS at 12:03

## 2023-02-24 RX ADMIN — CIPROFLOXACIN 400 MG: 2 INJECTION, SOLUTION INTRAVENOUS at 00:34

## 2023-02-24 RX ADMIN — OXYCODONE HYDROCHLORIDE 10 MG: 10 TABLET ORAL at 16:13

## 2023-02-24 RX ADMIN — CEFEPIME 2000 MG: 2 INJECTION, POWDER, FOR SOLUTION INTRAVENOUS at 20:29

## 2023-02-24 RX ADMIN — ACETAMINOPHEN 650 MG: 325 TABLET ORAL at 17:43

## 2023-02-24 RX ADMIN — OXYCODONE HYDROCHLORIDE 10 MG: 10 TABLET ORAL at 20:32

## 2023-02-24 RX ADMIN — SODIUM CHLORIDE: 9 INJECTION, SOLUTION INTRAVENOUS at 04:33

## 2023-02-24 ASSESSMENT — PAIN SCALES - GENERAL
PAINLEVEL_OUTOF10: 7
PAINLEVEL_OUTOF10: 3
PAINLEVEL_OUTOF10: 6
PAINLEVEL_OUTOF10: 4
PAINLEVEL_OUTOF10: 9
PAINLEVEL_OUTOF10: 6
PAINLEVEL_OUTOF10: 3
PAINLEVEL_OUTOF10: 4

## 2023-02-24 ASSESSMENT — PAIN DESCRIPTION - FREQUENCY: FREQUENCY: INTERMITTENT

## 2023-02-24 ASSESSMENT — PAIN DESCRIPTION - LOCATION
LOCATION: ABDOMEN
LOCATION: ABDOMEN
LOCATION: ABDOMEN;FLANK
LOCATION: HEAD
LOCATION: HEAD

## 2023-02-24 ASSESSMENT — PAIN DESCRIPTION - DESCRIPTORS
DESCRIPTORS: DISCOMFORT
DESCRIPTORS: ACHING
DESCRIPTORS: ACHING;DISCOMFORT
DESCRIPTORS: ACHING
DESCRIPTORS: ACHING

## 2023-02-24 ASSESSMENT — PAIN - FUNCTIONAL ASSESSMENT
PAIN_FUNCTIONAL_ASSESSMENT: ACTIVITIES ARE NOT PREVENTED
PAIN_FUNCTIONAL_ASSESSMENT: PREVENTS OR INTERFERES SOME ACTIVE ACTIVITIES AND ADLS
PAIN_FUNCTIONAL_ASSESSMENT: ACTIVITIES ARE NOT PREVENTED

## 2023-02-24 ASSESSMENT — PAIN DESCRIPTION - ORIENTATION
ORIENTATION: MID
ORIENTATION: LEFT;MID

## 2023-02-24 NOTE — CARE COORDINATION
[facility]    Referral received from  to follow for home care services. I will follow for needs, and speak with patient to verify demos. I have submitted for approval, and will update when I'm able.      Chalo Irving RN, BSN CTN  [facility] [phone]

## 2023-02-24 NOTE — PROGRESS NOTES
Pt Name: Aline Frazier  Medical Record Number: [id number]  Date of Birth 1975   Today's Date: 2/24/2023      Subjective:  Awake ambulating room, looks and feels much better today. Had pain initially after abscess drained but she said it is gradually resolving now. ROS: Constitutional: low grade fevers    Vitals:  Vitals:    02/24/23 0558 02/24/23 0717 02/24/23 0721 02/24/23 0730   BP:  (!) 160/96  (!) 160/96   Pulse:  100  94   Resp:   19 22   Temp:  98.7 °F (37.1 °C)  98.7 °F (37.1 °C)   TempSrc:  Oral  Oral   SpO2:  100%  99%   Weight: 136 lb 14.4 oz (62.1 kg)      Height:         I/O last 3 completed shifts:   In: 2781.5 [P.O.:940; I.V.:1586.1; IV Piggyback:255.4]  Out: 350 [Urine:350]    Exam:  General: Awake, oriented, no acute distress  Respiratory: Nonlabored breathing  Abdomen: Soft, left flank midly-tender, non-distended, no masses  : spontaneously voiding  Skin: Skin color, texture, turgor normal, no rashes or lesions  Neurologic: no gross deficits    CURRENT MEDICATIONS   Scheduled Meds:   cefepime  2,000 mg IntraVENous Q8H    amLODIPine  10 mg Oral Daily    ciprofloxacin  400 mg IntraVENous Q12H    pantoprazole  40 mg Oral QAM AC    enoxaparin  40 mg SubCUTAneous Daily    scopolamine  1 patch TransDERmal Q72H    sodium chloride flush  5-40 mL IntraVENous 2 times per day    nicotine  1 patch TransDERmal Daily     Continuous Infusions:   sodium chloride Stopped (02/23/23 1329)    sodium chloride 150 mL/hr at 02/24/23 0642     PRN Meds:.docusate sodium, acetaminophen, polyethylene glycol, calcium carbonate, hydrALAZINE, oxyCODONE, morphine, ondansetron, sodium chloride flush, sodium chloride, [DISCONTINUED] acetaminophen **OR** acetaminophen    LABS     Recent Labs     02/22/23  0939 02/22/23  0940 02/23/23  0603 02/23/23  1405 02/24/23  0552   WBC  --  12.1* 13.2*  --  10.8   HGB  --  10.0* 9.7*  --  9.7*   HCT  --  30.3* 28.7*  --  29.2*   PLT  --  217 246  --  274   *  --  135* --  135*   K 3.4*  --  3.1*  --  3.5     --  107  --  106   CO2 17*  --  17*  --  20*   BUN 14  --  10  --  8   CREATININE 1.1  --  1.1  --  1.0   CALCIUM 7.2*  --  7.1*  --  7.0*   INR  --   --   --  1.3 1.29*   AST  --   --  15  --  16   ALT  --   --  11  --  12   BILITOT  --   --  0.6  --  0.5     CT abdomen/pelvis w/o contrast 2/17/23  Impression   1. Left renal cortical thinning and adjacent perinephric fat stranding with   no hydronephrosis or evident renal stone. The findings are suspicious for   pyelonephritis. Correlation with urinalysis is suggested. 2. No acute findings elsewhere in the abdomen or pelvis. 3. Age advanced atherosclerosis of the abdominal aorta and its branches. 4. Hepatomegaly. 5. Grade 1 anterolisthesis of L5 on S1 related to bilateral L5 spondylolysis   and advanced degenerative disc disease at L5-S1. CT abdomen/pelvis w/o contrast 2/21/23  Impression   Heterogeneous appearance to the left kidney with perinephric fat stranding,   compatible with given history of pyelonephritis. An ill-defined area of   hypodensity is seen in left kidney, similar to prior. This area could   represent a pre-existing cyst or and early abscess-phlegmon       Scattered small retroperitoneal nodes are seen, likely reactive       Findings of fluid overload, increased compared to prior, denoted by small   pleural effusions, body wall anasarca, and mild increased abdominal and   pelvic ascites. CT abdomen pelvis with contrast 2/23/23  Impression   Multiple small low-attenuation left renal cortical collections are most   consistent with microabscesses. The largest of these measures 2.8 cm and is   located in the mid polar region of the left kidney. No hydronephrosis. Increasing right paracolic gutter and pelvic free fluid. Persistent anasarca with small dependent pleural effusions. Aortoiliac atherosclerosis.        Blood cultures no growth  Urine culture [employer]. [id number] day # 7  48y. o. female with left pyelonephritis, small abscess. Repeat CT with contrast today with 2.8cm abscess and multiple smaller abscesses in left kidney. Discussed with IR and will drain this larger abscess today. S/p CT guided drainage of abscess with IR on 2/23/23  ID consulted 2/23/23  PLAN   Appreciate ID recommendations, continue antibiotics  Recommend f/u outpatient in 3-4 weeks to ensure infection has resolved, may get another CT at that time to ensure resolution of smaller abscesses as well.      Manuela Maradiaga, TERRA - CNP 2/24/2023 8:57 AM

## 2023-02-24 NOTE — PROGRESS NOTES
Hospitalist Progress Note      PCP: No primary care provider on file. Date of Admission: 2/17/2023    Chief Complaint: fever, abdominal pain. Hospital Course:    51 y.o. female with PMHx of Anxiety, COPD, HTN and substance abuse presented to [facility] in transfer from [facility] for management of sepsis with pyelonephritis. Pt presented to [place] ED with fever and lower abdominal pain present for several day.  + nausea and vomiting yesterday. No urinary symptoms. + Suprapubic abdominal pain,     Admitted with pyelonephritis. Subjective:   States  flank pain is much better today  Underwent CT guided drainage of abscess 2/23  ID consulted   Started on stool softener, no BM yet     Medications:  Reviewed    Infusion Medications    sodium chloride Stopped (02/23/23 1329)    sodium chloride 150 mL/hr at 02/24/23 0317     Scheduled Medications    cefepime  2,000 mg IntraVENous Q8H    amLODIPine  10 mg Oral Daily    ciprofloxacin  400 mg IntraVENous Q12H    pantoprazole  40 mg Oral QAM AC    enoxaparin  40 mg SubCUTAneous Daily    scopolamine  1 patch TransDERmal Q72H    sodium chloride flush  5-40 mL IntraVENous 2 times per day    nicotine  1 patch TransDERmal Daily     PRN Meds: docusate sodium, acetaminophen, polyethylene glycol, calcium carbonate, hydrALAZINE, oxyCODONE, morphine, ondansetron, sodium chloride flush, sodium chloride, [DISCONTINUED] acetaminophen **OR** acetaminophen      Intake/Output Summary (Last 24 hours) at 2/24/2023 0847  Last data filed at 2/24/2023 9546  Gross per 24 hour   Intake 2456.67 ml   Output 350 ml   Net 2106.67 ml         Physical Exam Performed:    BP (!) 160/96   Pulse 94   Temp 98.7 °F (37.1 °C) (Oral)   Resp 22   Ht 5' 4\" (1.626 m)   Wt 136 lb 14.4 oz (62.1 kg)   SpO2 99%   BMI 23.50 kg/m²     General appearance: No apparent distress, appears stated age and cooperative. HEENT: Pupils equal, round, and reactive to light.  Conjunctivae/corneas clear.  Neck: Supple, with full range of motion. No jugular venous distention. Trachea midline. Respiratory:  Normal respiratory effort. Clear to auscultation, bilaterally without Rales/Wheezes/Rhonchi. Cardiovascular: Regular rate and rhythm with normal S1/S2 without murmurs, rubs or gallops. Abdomen: soft not tender, not distended , + BS    Musculoskeletal: No clubbing, cyanosis or edema bilaterally. Full range of motion without deformity. Skin: Skin color, texture, turgor normal.  No rashes or lesions. Neurologic:  Neurovascularly intact without any focal sensory/motor deficits. Cranial nerves: II-XII intact, grossly non-focal.  Psychiatric: Alert and oriented, thought content appropriate, normal insight         Labs:   Recent Labs     02/22/23  0940 02/23/23  0603 02/24/23  0552   WBC 12.1* 13.2* 10.8   HGB 10.0* 9.7* 9.7*   HCT 30.3* 28.7* 29.2*    246 274       Recent Labs     02/22/23  0939 02/23/23  0603 02/24/23  0552   * 135* 135*   K 3.4* 3.1* 3.5    107 106   CO2 17* 17* 20*   BUN 14 10 8   CREATININE 1.1 1.1 1.0   CALCIUM 7.2* 7.1* 7.0*       Recent Labs     02/23/23  0603 02/24/23  0552   AST 15 16   ALT 11 12   BILITOT 0.6 0.5   ALKPHOS 122 112       Recent Labs     02/23/23  1405 02/24/23  0552   INR 1.3 1.29*     No results for input(s): [facility] in the last 72 hours. Urinalysis:      Lab Results   Component Value Date/Time    NITRU POSITIVE 02/17/2023 03:00 PM    WBCUA  02/17/2023 03:00 PM    BACTERIA 2+ 02/17/2023 03:00 PM    RBCUA  02/17/2023 03:00 PM    BLOODU LARGE 02/17/2023 03:00 PM    SPECGRAV 1.020 02/17/2023 03:00 PM    GLUCOSEU Negative 02/17/2023 03:00 PM       Radiology:  CT ASP ABS HEMATOMA BULLA CYST   Final Result   Successful CT guided left kidney abscess aspiration. CT GUIDED NEEDLE PLACEMENT   Final Result   Successful CT guided left kidney abscess aspiration.          CT ABDOMEN PELVIS W IV CONTRAST Additional Contrast? None   Final Result   Multiple small low-attenuation left renal cortical collections are most   consistent with microabscesses. The largest of these measures 2.8 cm and is   located in the mid polar region of the left kidney. No hydronephrosis. Increasing right paracolic gutter and pelvic free fluid. Persistent anasarca with small dependent pleural effusions. Aortoiliac atherosclerosis. CT ABDOMEN PELVIS WO CONTRAST Additional Contrast? None   Final Result   Heterogeneous appearance to the left kidney with perinephric fat stranding,   compatible with given history of pyelonephritis. An ill-defined area of   hypodensity is seen in left kidney, similar to prior. This area could   represent a pre-existing cyst or and early abscess-phlegmon      Scattered small retroperitoneal nodes are seen, likely reactive      Findings of fluid overload, increased compared to prior, denoted by small   pleural effusions, body wall anasarca, and mild increased abdominal and   pelvic ascites. CT ABDOMEN PELVIS WO CONTRAST Additional Contrast? None   Final Result   1. Left renal cortical thinning and adjacent perinephric fat stranding with   no hydronephrosis or evident renal stone. The findings are suspicious for   pyelonephritis. Correlation with urinalysis is suggested. 2. No acute findings elsewhere in the abdomen or pelvis. 3. Age advanced atherosclerosis of the abdominal aorta and its branches. 4. Hepatomegaly. 5. Grade 1 anterolisthesis of L5 on S1 related to bilateral L5 spondylolysis   and advanced degenerative disc disease at L5-S1. XR CHEST PORTABLE   Final Result   No radiographic evidence of an acute cardiopulmonary process.              IP CONSULT TO UROLOGY  IP CONSULT TO INFECTIOUS DISEASES    Assessment/Plan:    Active Hospital Problems    Diagnosis     Fever and chills [R50.9]      Priority: Medium    Neutrophilia [D72.9]      Priority: Medium    E coli infection [A49.8]      Priority: Medium    Renal abscess, left [N15.1]      Priority: Medium    Lactic acid acidosis [E87.20]      Priority: Medium    Elevated procalcitonin [R79.89]      Priority: Medium    Septicemia ([address] [A41.9]      Priority: Medium    Sepsis ([address] [A41.9]      Priority: Medium    Pyelonephritis [N12]      Priority: Medium    Acute kidney injury ([address] [N17.9]      Priority: Medium     Sepsis ([address] on admission due to Acute Pyelonephritis  -patient presented with Tachycardia,  Neutrophilic Leukocytosis and infection, nitial Lactic Acid 2.4 > 1.7.   -Ct abd/pelv: Left renal cortical thinning and adjacent perinephric fat stranding, concerning for  pyelonephritis. Urine culture positive for sensitive Ecoli. Patient continues to spike fever after 4 days of AB, repeated CT 2/21 with ? Small cyst vs abscess. Has kidney abscess s/p CT guided drainage of abscess 2/23      Plan   - Urine  sensitive E. Coli, added iv  cipro to cefipime given continued fever. ID on board   Dose of cefepime increased to 2 gm q8h  Underwent CT guided drainage of abscess 2/23  ID consulted   Follow fluid cx   - urology on board   - Hydrate with IVF, decrease rate to 100 ml/hr as the patient tolerate oral intake now  Consider stop iv fluid tomorrow   Leukocytosis better today      Acute kidney injury -   the etiology is unclear but considerations include dehydration, hypotension, nephrotoxic medications, and ATN  - crt baseline 0.8,  today 2.5--> 1.9--> 1.5--> 1.3  - IVF  - Avoid nephrotoxins  Today serum creatinine 1.1  Replace hypokalemia     Uncontrolled pain. Continue prn oxycodone and as need iv morphine     COPD (chronic obstructive pulmonary disease)   - without acute exacerbation.   - Nebulizer treatments as needed. HTN  Start on oral amlodipine 10 mg daily   - on iv hydralazine iv as needed        Anxiety  - supportive therapy  - resume    Hypokalemia. Replaced.        DVT Prophylaxis: lovenox  Diet: ADULT DIET; Regular  Code Status: Full Code  PT/OT Eval Status: baseline .      Dispo -pending clinical improvement      Richard Mark MD

## 2023-02-24 NOTE — PROGRESS NOTES
Comprehensive Nutrition Assessment    Type and Reason for Visit:  Initial, RD Nutrition Re-Screen/LOS    Nutrition Recommendations/Plan:   Continue rregular diet     Malnutrition Assessment:  Malnutrition Status:  No malnutrition (02/24/23 1131)    Context:  Acute Illness         Nutrition Assessment:    Pt screened for LOS. PMH includes; COPD, CVA. Pt adm and found to have Sepsis, Left Pyelonephritis with abscesses. Noted that pt is s/p abscess drainage on 2/23. Diet offered is regular, with improving po, generally > 50 %. Noted no BM since adm. MD addressing. Will continue to follow progress. Nutrition Related Findings:    MD addressing BM status. Labs reviewed. Noted no edema. Wound Type: Surgical Incision (lower back. No issues noted)       Current Nutrition Intake & Therapies:    Average Meal Intake: %, 51-75%     ADULT DIET; Regular    Anthropometric Measures:  Height: 5' 4\" (162.6 cm)  Ideal Body Weight (IBW): 120 lbs (55 kg)    Admission Body Weight: 130 lb (59 kg)  Current Body Weight: 137 lb (62.1 kg),   IBW. Weight Source: Bed Scale  Current BMI (kg/m2): 23.5                          BMI Categories: Normal Weight (BMI 18.5-24. 9)        Nutrition Diagnosis:   No nutrition diagnosis at this time     Nutrition Interventions:   Food and/or Nutrient Delivery: Continue Current Diet  Nutrition Education/Counseling: No recommendation at this time  Coordination of Nutrition Care: Continue to monitor while inpatient       Goals:     Goals: PO intake 50% or greater       Nutrition Monitoring and Evaluation:   Behavioral-Environmental Outcomes: None Identified  Food/Nutrient Intake Outcomes: Food and Nutrient Intake  Physical Signs/Symptoms Outcomes: Biochemical Data, Constipation, Diarrhea, Fluid Status or Edema, Weight, Skin    Discharge Planning:    Continue current diet     Benito Leonard, [address] [place] [address],   Contact: [phone]

## 2023-02-24 NOTE — CARE COORDINATION
A quick chart review reveals that this patient is from home with family and waiting on urology clearance and IVAB recs. Urology signed off this morning. We will continue to follow for needs. Respectfully submitted,    BHAVESH Alegre  [facility]   [phone]    Electronically signed by BHAVESH Sorto on 2/24/2023 at 9:53 AM

## 2023-02-24 NOTE — PROGRESS NOTES
Infectious Diseases Inpatient Progress Note        CHIEF COMPLAINT:     Sepsis  Left pyelonephritis  Left Kidney abscess  E coli infection          HISTORY OF PRESENT ILLNESS:  51 y.o. woman with a history of anxiety, COPD, hypertension, drug use admitted to hospital secondary to left flank pain fever chills not feeling well. Admission labs indicate creatinine elevated 2.5 lactic acid at 2.6 procalcitonin mild elevation of 19 LFT normal, WBC elevated at 14.5 hemoglobin 13.5, urinalysis abnormal urine culture positive for E. coli sensitivities noted. Fever Tmax 103 since admission. Given ongoing flank pain she underwent repeat CT abdomen pelvis on 2/23 indicating left renal cortical collection consistent with microabscess with the largest abscess measuring about 2.8 cm in the left  midpole region. Interventional radiology consulted she underwent CT-guided aspiration of the left renal abscess. Culture in process. We are consulted for recommendations.   Location : Left flank pain, left abdominal pain     Quality : A darrius       Severity : 10/10  Duration : 3 days     Timing : Constant  Context : UTI  Modifying factors : None  Associated signs and symptoms: Fever, chills, nausea, vomiting    INTERVAL HISTORY : Complains of ongoing left-sided flank pain tolerating antibiotic therapy okay WBC trending down left renal aspiration culture positive for E. coli sensitivities pending    Past Medical History:    Past Medical History:   Diagnosis Date    Anxiety     Arthritis     h/o C6 fx, bulging disc on back    COPD (chronic obstructive pulmonary disease) (Formerly [facility])     [place], surgical     High blood pressure     Neck fracture (Formerly [facility])     Stroke (Formerly [facility])     right facial droop sl drool    Substance abuse ([facility] [address] 10/11/2013       Past Surgical History:    Past Surgical History:   Procedure Laterality Date    ABDOMEN SURGERY      exploratory    ANTERIOR CRUCIATE LIGAMENT REPAIR Left 9/13/2019    LEFT KNEE ARTHROSCOPY, ANTERIOR CRUCIATE LIGAMENT RECONSTRUCTION WITH ACHILLES ALLOGRAFT; MEDIAL MENISCECTOMY REPAIR performed by Jeremías Schmitz MD at [address] [facility]  2/23/2023    CT ASP ABS HEMATOMA BULLA [facility]ST 2/23/2023 [facility] CT    OTHER SURGICAL HISTORY      birthmark removed from face as a 6th grader       Current Medications:    No outpatient medications have been marked as taking for the 2/17/23 encounter [facility] Encounter). Allergies:  Tramadol    Immunizations : There is no immunization history on file for this patient.       Social History:     Social History     Tobacco Use    Smoking status: Every Day     Packs/day: 1.00     Years: 20.00     Pack years: 20.00     Types: Cigarettes    Smokeless tobacco: Never   Vaping Use    Vaping Use: Never used   Substance Use Topics    Alcohol use: No    Drug use: Yes     Types: Marijuana (Weed)     Social History     Tobacco Use   Smoking Status Every Day    Packs/day: 1.00    Years: 20.00    Pack years: 20.00    Types: Cigarettes   Smokeless Tobacco Never      Family History   Problem Relation Age of Onset    Heart Failure Mother         pace maker    COPD Mother     COPD Father     Heart Attack Father     Heart Disease Father         stents/bypass    Cancer Maternal Aunt         breast/lung    Arthritis Other     Diabetes Other     High Blood Pressure Other     Stroke Sister 27    Lupus Sister     Heart Disease Brother     Heart Attack Brother     COPD Maternal Grandmother     No Known Problems Maternal Grandfather     No Known Problems Paternal Grandmother     No Known Problems Paternal Grandfather     Heart Disease Sister     Breast Cancer Maternal Aunt           REVIEW OF SYSTEMS:      Constitutional:  r fevers++ , chills++, night sweats  Eyes:  negative for blurred vision, eye discharge, visual disturbance   HEENT:  negative for hearing loss, ear drainage,nasal congestion  Respiratory:  negative for cough, shortness of breath or hemoptysis   Cardiovascular: negative for chest pain, palpitations, syncope  Gastrointestinal:  negative for nausea, vomiting, diarrhea, constipation, abdominal pain++ left flank pain  Genitourinary:  negative for frequency, dysuria, urinary incontinence, hematuria  Hematologic/Lymphatic:  negative for easy bruising, bleeding and lymphadenopathy  Allergic/Immunologic:  negative for recurrent infections, angioedema, anaphylaxis   Endocrine:  negative for weight changes, polyuria, polydipsia and polyphagia  Musculoskeletal:  negative for joint  pain, swelling, decreased range of motion  Integumentary: No rashes, skin lesions  Neurological:  negative for headaches, slurred speech, unilateral weakness  Psychiatric: negative for hallucinations,confusion,agitation.      PHYSICAL EXAM:      Vitals:  T max  103  BP (!) 160/96   Pulse 94   Temp 98.7 °F (37.1 °C) (Oral)   Resp 20   Ht 5' 4\" (1.626 m)   Wt 136 lb 14.4 oz (62.1 kg)   SpO2 99%   BMI 23.50 kg/m²     General Appearance: alert,in some acute distress++ pallor, no icterus   Skin: warm and dry, no rash or erythema  Head: normocephalic and atraumatic  Eyes: pupils equal, round, and reactive to light, conjunctivae normal  ENT: tympanic membrane, external ear and ear canal normal bilaterally, nose without deformity, nasal mucosa and turbinates normal without polyps  Neck: supple and non-tender without mass, no thyromegaly  no cervical lymphadenopathy  Pulmonary/Chest: clear to auscultation bilaterally- no wheezes, rales or rhonchi, normal air movement, no respiratory distress  Cardiovascular: normal rate, regular rhythm, normal S1 and S2, no murmurs, rubs, clicks, or gallops, no carotid bruits  Abdomen: soft, non-tender, non-distended, normal bowel sounds, no masses or organomegaly  Left flank pain +   Extremities: no cyanosis, clubbing or edema  Musculoskeletal: normal range of motion, no joint swelling, deformity or tenderness  Integumentary: No rashes, no abnormal skin lesions, no petechiae  Neurologic: reflexes normal and symmetric, no cranial nerve deficit  Psych:  Orientation, sensorium, mood normal   Lines: IV    DATA:    CBC:   Lab Results   Component Value Date    WBC 10.8 02/24/2023    HGB 9.7 (L) 02/24/2023    HCT 29.2 (L) 02/24/2023    MCV 91.9 02/24/2023     02/24/2023     RENAL:   Lab Results   Component Value Date    CREATININE 1.0 02/24/2023    BUN 8 02/24/2023     (L) 02/24/2023    K 3.5 02/24/2023     02/24/2023    CO2 20 (L) 02/24/2023     SED RATE:   Lab Results   Component Value Date/Time    SEDRATE 27 02/23/2023 12:40 PM     CK:   Lab Results   Component Value Date/Time    CKTOTAL 84 11/05/2013 03:03 PM     CRP:   Lab Results   Component Value Date/Time    .6 02/23/2023 12:40 PM     Hepatic Function Panel:   Lab Results   Component Value Date/Time    ALKPHOS 112 02/24/2023 05:52 AM    ALT 12 02/24/2023 05:52 AM    AST 16 02/24/2023 05:52 AM    PROT 5.0 02/24/2023 05:52 AM    PROT 6.9 09/13/2010 04:48 PM    BILITOT 0.5 02/24/2023 05:52 AM    LABALBU 2.3 02/24/2023 05:52 AM     UA:  Lab Results   Component Value Date/Time    COLORU Yellow 02/17/2023 03:00 PM    CLARITYU CLOUDY 02/17/2023 03:00 PM    GLUCOSEU Negative 02/17/2023 03:00 PM    BILIRUBINUR Negative 02/17/2023 03:00 PM    KETUA Negative 02/17/2023 03:00 PM    SPECGRAV 1.020 02/17/2023 03:00 PM    BLOODU LARGE 02/17/2023 03:00 PM    PHUR 5.5 02/17/2023 03:00 PM    PHUR 5.5 02/17/2023 03:00 PM    PROTEINU >=300 02/17/2023 03:00 PM    UROBILINOGEN 0.2 02/17/2023 03:00 PM    NITRU POSITIVE 02/17/2023 03:00 PM    LEUKOCYTESUR SMALL 02/17/2023 03:00 PM    LABMICR YES 02/17/2023 03:00 PM    URINETYPE Voided 02/17/2023 03:00 PM      Urine Microscopic:   Lab Results   Component Value Date/Time    BACTERIA 2+ 02/17/2023 03:00 PM    WBCUA  02/17/2023 03:00 PM    RBCUA  02/17/2023 03:00 PM    EPIU 21-50 02/17/2023 03:00 PM     Urine Reflex to Culture:   Lab Results   Component Value Date/Time    URRFLXCULT Yes 02/17/2023 03:00 PM       Creat  2.5   Lactic acid  2.5     Procal   19.58 down to   8.68     MICRO: cultures reviewed and updated by me       Blood Culture:   Lab Results   Component Value Date/Time    BC No Growth after 4 days of incubation. 02/17/2023 02:45 PM    BLOODCULT2 No Growth after 4 days of incubation. 02/17/2023 04:50 PM     /23 1445       Order Status: Completed Specimen: Blood Updated: 02/21/23 2215    Blood Culture, Routine No Growth after 4 days of incubation. Narrative:     ORDER#: [id number]                          ORDERED BY: Pablo Abrams   SOURCE: Blood Antecubital-Rig              COLLECTED:  02/17/23 14:45   ANTIBIOTICS AT KATHRINE.:                      RECEIVED :  02/17/23 21:31   If child <=2 yrs old please draw pediatric bottle. ~Blood Culture 1   Culture, Blood 2 [[id number]] Collected: 02/17/23 1650   Order Status: Completed Specimen: Blood Updated: 02/21/23 2215    Culture, Blood 2 No Growth after 4 days of incubation. Narrative:     ORDER#: [id number]                          ORDERED BY: Pablo Abrams   SOURCE: Blood                              COLLECTED:  02/17/23 16:50   ANTIBIOTICS AT KATHRINE.:                      RECEIVED :  02/17/23 21:31   If child <=2 yrs old please draw pediatric bottle. ~Blood Culture #2   Culture, Urine [[id number]] (Abnormal)  Collected: 02/17/23 1500   Order Status: Completed Specimen: Urine, clean catch Updated: 02/20/23 0812    Organism Escherichia coli Abnormal     Urine Culture, Routine >100,000 CFU/ml   Narrative:     ORDER#: [id number]                          ORDERED BY: Pablo Abrams   SOURCE: Urine Clean Catch                  COLLECTED:  02/17/23 15:00   ANTIBIOTICS AT KATHRINE.:                      RECEIVED :  02/17/23 21:32   COVID-19, Rapid [[id number]] Collected: 02/17/23 1445   Order Status: Completed Specimen: Nasopharyngeal Swab Updated: 02/17/23 1524    SARS-CoV-2, NAAT Not Detected    Comment: Rapid NAAT: Negative results should be treated as presumptive and,   if inconsistent with clinical signs and symptoms or necessary for   patient management, should be tested with an alternative molecular   assay. Negative results do not preclude SARS-CoV-2 infection and   should not be used as the sole basis for patient management decisions. This test has been authorized by the FDA under an Emergency Use   Authorization (EUA) for use by authorized laboratories. Fact sheet for Healthcare Providers:   http://www.lily.oneyda/   Fact sheet for Patients: http://www.lily.oneyda/     METHODOLOGY: Isothermal Nucleic Acid Amplification       Culture, Blood 1 [[id number]] Collected: 02/17/23 0000   Order Status: Canceled Specimen: Blood    Culture, Blood 2 [[id number]] Collected: 02/17/23 0000   Order Status: Canceled Specimen: Blood      Susceptibility    Escherichia coli (1)    Antibiotic Interpretation Microscan  Method Status    ampicillin Resistant >=32 mcg/mL BACTERIAL SUSCEPTIBILITY PANEL BY ARI     ampicillin-sulbactam Sensitive 4 mcg/mL BACTERIAL SUSCEPTIBILITY PANEL BY ARI     ceFAZolin Sensitive <=4 mcg/mL BACTERIAL SUSCEPTIBILITY PANEL BY ARI      NOTE: Cefazolin should only be used for uncomplicated UTI         for E.coli or Klebsiella pneumoniae.         cefepime Sensitive <=0.12 mcg/mL BACTERIAL SUSCEPTIBILITY PANEL BY ARI     cefTRIAXone Sensitive <=0.25 mcg/mL BACTERIAL SUSCEPTIBILITY PANEL BY ARI     ciprofloxacin Sensitive <=0.25 mcg/mL BACTERIAL SUSCEPTIBILITY PANEL BY ARI     ertapenem Sensitive <=0.12 mcg/mL BACTERIAL SUSCEPTIBILITY PANEL BY ARI     gentamicin Sensitive <=1 mcg/mL BACTERIAL SUSCEPTIBILITY PANEL BY ARI     levofloxacin Sensitive <=0.12 mcg/mL BACTERIAL SUSCEPTIBILITY PANEL BY ARI     nitrofurantoin Sensitive <=16 mcg/mL BACTERIAL SUSCEPTIBILITY PANEL BY ARI     piperacillin-tazobactam Sensitive <=4 mcg/mL BACTERIAL SUSCEPTIBILITY PANEL BY ARI trimethoprim-sulfamethoxazole Resistant >=320 mcg/mL BACTERIAL SUSCEPTIBILITY PANEL BY ARI        Narrative    Viral Culture:    Lab Results   Component Value Date/Time    COVID19 Not Detected 02/17/2023 02:45 PM     Urine Culture: No results for input(s): Michelle Patton in the last 72 hours. Scheduled Meds:   cefepime  2,000 mg IntraVENous Q8H    amLODIPine  10 mg Oral Daily    ciprofloxacin  400 mg IntraVENous Q12H    pantoprazole  40 mg Oral QAM AC    enoxaparin  40 mg SubCUTAneous Daily    scopolamine  1 patch TransDERmal Q72H    sodium chloride flush  5-40 mL IntraVENous 2 times per day    nicotine  1 patch TransDERmal Daily       Continuous Infusions:   sodium chloride Stopped (02/23/23 1329)    sodium chloride 100 mL/hr at 02/24/23 0918       PRN Meds:  docusate sodium, acetaminophen, polyethylene glycol, calcium carbonate, hydrALAZINE, oxyCODONE, morphine, ondansetron, sodium chloride flush, sodium chloride, [DISCONTINUED] acetaminophen **OR** acetaminophen    Imaging:   CT ASP ABS HEMATOMA BULLA CYST   Final Result   Successful CT guided left kidney abscess aspiration. CT GUIDED NEEDLE PLACEMENT   Final Result   Successful CT guided left kidney abscess aspiration. CT ABDOMEN PELVIS W IV CONTRAST Additional Contrast? None   Final Result   Multiple small low-attenuation left renal cortical collections are most   consistent with microabscesses. The largest of these measures 2.8 cm and is   located in the mid polar region of the left kidney. No hydronephrosis. Increasing right paracolic gutter and pelvic free fluid. Persistent anasarca with small dependent pleural effusions. Aortoiliac atherosclerosis. CT ABDOMEN PELVIS WO CONTRAST Additional Contrast? None   Final Result   Heterogeneous appearance to the left kidney with perinephric fat stranding,   compatible with given history of pyelonephritis.   An ill-defined area of   hypodensity is seen in left kidney, similar to prior.  This area could   represent a pre-existing cyst or and early abscess-phlegmon      Scattered small retroperitoneal nodes are seen, likely reactive      Findings of fluid overload, increased compared to prior, denoted by small   pleural effusions, body wall anasarca, and mild increased abdominal and   pelvic ascites. CT ABDOMEN PELVIS WO CONTRAST Additional Contrast? None   Final Result   1. Left renal cortical thinning and adjacent perinephric fat stranding with   no hydronephrosis or evident renal stone. The findings are suspicious for   pyelonephritis. Correlation with urinalysis is suggested. 2. No acute findings elsewhere in the abdomen or pelvis. 3. Age advanced atherosclerosis of the abdominal aorta and its branches. 4. Hepatomegaly. 5. Grade 1 anterolisthesis of L5 on S1 related to bilateral L5 spondylolysis   and advanced degenerative disc disease at L5-S1. XR CHEST PORTABLE   Final Result   No radiographic evidence of an acute cardiopulmonary process. All pertinent images and reports for the current Hospitalization were reviewed by me. IMPRESSION:    Patient Active Problem List   Diagnosis    Acromioclavicular joint arthritis    Rotator cuff tendonitis    Mood disorder (HCC)    Substance abuse ([address]    Suicidal ideation    Left anterior cruciate ligament tear    Tear of meniscus of knee joint    Sepsis ([address]    Pyelonephritis    Acute kidney injury ([address]    Fever and chills    Neutrophilia    E coli infection    Renal abscess, left    Lactic acid acidosis    Elevated procalcitonin    Septicemia (HCC)     Sepsis  Complicated urinary tract infection  Blood pyelonephritis  Left renal abscess  E. coli urinary tract infection  Lactic acidosis  WBC elevation  Fever and chills  COPD  Smoking  History of drug use  Status post IR guided aspiration of the left renal abscess. Abscess culture requested in process.     Blood cultures remain negative but has ongoing left flank pain with a left pyelonephritis complicated with a left renal abscess. Status post IR guided aspiration. Abscess culture in process. Tolerating antibiotic therapy okay. E. coli sensitivities reviewed. Agree with current antibiotic choices. Left renal aspiration culture positive for E. coli sensitivities pending    Labs, Microbiology, Radiology and pertinent results from current hospitalization and care every where were reviewed by me as a part of the consultation. PLAN :  1.  Continue IV cefepime 2 g every 8 -   2. Continue ciprofloxacin 400 mg Q 12 hrs  3. IR abscess cx E. coli noted sensitivities pending  4. Check HIV screen  5. ESR, CRP  6. PROCAL  7. NO renal STONES -she may have passed a stone  8. We will be able to choose oral antibiotics when ready    Discussed with patient/Family and Nursing   Risk of Complications/Morbidity: High      Illness(es)/ Infection present that pose threat to bodily function. There is potential for severe exacerbation of infection/side effects of treatment. Therapy requires intensive monitoring for antimicrobial agent toxicity. Thanks for allowing me to participate in your patient's care please call me with any questions or concerns.     Dr. Steve Chaparro MD  [address] [place] [address] Physician  Phone: [phone]   Fax : [phone]

## 2023-02-25 LAB
A/G RATIO: 0.8 (ref 1.1–2.2)
ALBUMIN SERPL-MCNC: 2.3 G/DL (ref 3.4–5)
ALP BLD-CCNC: 105 U/L (ref 40–129)
ALT SERPL-CCNC: 16 U/L (ref 10–40)
ANION GAP SERPL CALCULATED.3IONS-SCNC: 10 MMOL/L (ref 3–16)
AST SERPL-CCNC: 20 U/L (ref 15–37)
BASOPHILS ABSOLUTE: 0 K/UL (ref 0–0.2)
BASOPHILS RELATIVE PERCENT: 0.2 %
BILIRUB SERPL-MCNC: 0.4 MG/DL (ref 0–1)
BUN BLDV-MCNC: 6 MG/DL (ref 7–20)
CALCIUM SERPL-MCNC: 7.3 MG/DL (ref 8.3–10.6)
CHLORIDE BLD-SCNC: 105 MMOL/L (ref 99–110)
CO2: 19 MMOL/L (ref 21–32)
CREAT SERPL-MCNC: 0.9 MG/DL (ref 0.6–1.1)
EOSINOPHILS ABSOLUTE: 0 K/UL (ref 0–0.6)
EOSINOPHILS RELATIVE PERCENT: 0.5 %
GFR SERPL CREATININE-BSD FRML MDRD: >60 ML/MIN/{1.73_M2}
GLUCOSE BLD-MCNC: 144 MG/DL (ref 70–99)
HCT VFR BLD CALC: 27.5 % (ref 36–48)
HEMOGLOBIN: 9.2 G/DL (ref 12–16)
LYMPHOCYTES ABSOLUTE: 0.8 K/UL (ref 1–5.1)
LYMPHOCYTES RELATIVE PERCENT: 7.6 %
MCH RBC QN AUTO: 30.4 PG (ref 26–34)
MCHC RBC AUTO-ENTMCNC: 33.4 G/DL (ref 31–36)
MCV RBC AUTO: 90.8 FL (ref 80–100)
MONOCYTES ABSOLUTE: 0.8 K/UL (ref 0–1.3)
MONOCYTES RELATIVE PERCENT: 7.5 %
NEUTROPHILS ABSOLUTE: 8.5 K/UL (ref 1.7–7.7)
NEUTROPHILS RELATIVE PERCENT: 84.2 %
PDW BLD-RTO: 14.3 % (ref 12.4–15.4)
PLATELET # BLD: 321 K/UL (ref 135–450)
PMV BLD AUTO: 8.1 FL (ref 5–10.5)
POTASSIUM SERPL-SCNC: 3.2 MMOL/L (ref 3.5–5.1)
RBC # BLD: 3.03 M/UL (ref 4–5.2)
SODIUM BLD-SCNC: 134 MMOL/L (ref 136–145)
TOTAL PROTEIN: 5.2 G/DL (ref 6.4–8.2)
WBC # BLD: 10 K/UL (ref 4–11)

## 2023-02-25 PROCEDURE — 80053 COMPREHEN METABOLIC PANEL: CPT

## 2023-02-25 PROCEDURE — 6370000000 HC RX 637 (ALT 250 FOR IP): Performed by: RADIOLOGY

## 2023-02-25 PROCEDURE — 6360000002 HC RX W HCPCS: Performed by: INTERNAL MEDICINE

## 2023-02-25 PROCEDURE — 2060000000 HC ICU INTERMEDIATE R&B

## 2023-02-25 PROCEDURE — 6370000000 HC RX 637 (ALT 250 FOR IP): Performed by: STUDENT IN AN ORGANIZED HEALTH CARE EDUCATION/TRAINING PROGRAM

## 2023-02-25 PROCEDURE — 2580000003 HC RX 258: Performed by: INTERNAL MEDICINE

## 2023-02-25 PROCEDURE — 6370000000 HC RX 637 (ALT 250 FOR IP): Performed by: INTERNAL MEDICINE

## 2023-02-25 PROCEDURE — 85025 COMPLETE CBC W/AUTO DIFF WBC: CPT

## 2023-02-25 PROCEDURE — 6370000000 HC RX 637 (ALT 250 FOR IP): Performed by: HOSPITALIST

## 2023-02-25 PROCEDURE — 6370000000 HC RX 637 (ALT 250 FOR IP): Performed by: NURSE PRACTITIONER

## 2023-02-25 PROCEDURE — 6360000002 HC RX W HCPCS: Performed by: HOSPITALIST

## 2023-02-25 PROCEDURE — 99233 SBSQ HOSP IP/OBS HIGH 50: CPT | Performed by: INTERNAL MEDICINE

## 2023-02-25 PROCEDURE — 94760 N-INVAS EAR/PLS OXIMETRY 1: CPT

## 2023-02-25 PROCEDURE — 6360000002 HC RX W HCPCS: Performed by: STUDENT IN AN ORGANIZED HEALTH CARE EDUCATION/TRAINING PROGRAM

## 2023-02-25 PROCEDURE — 2580000003 HC RX 258: Performed by: HOSPITALIST

## 2023-02-25 RX ORDER — POTASSIUM CHLORIDE 7.45 MG/ML
10 INJECTION INTRAVENOUS PRN
Status: DISCONTINUED | OUTPATIENT
Start: 2023-02-25 | End: 2023-02-26 | Stop reason: HOSPADM

## 2023-02-25 RX ORDER — POTASSIUM CHLORIDE 20 MEQ/1
40 TABLET, EXTENDED RELEASE ORAL PRN
Status: DISCONTINUED | OUTPATIENT
Start: 2023-02-25 | End: 2023-02-26 | Stop reason: HOSPADM

## 2023-02-25 RX ADMIN — ACETAMINOPHEN 650 MG: 325 TABLET ORAL at 04:15

## 2023-02-25 RX ADMIN — OXYCODONE HYDROCHLORIDE 10 MG: 10 TABLET ORAL at 20:21

## 2023-02-25 RX ADMIN — CEFEPIME 2000 MG: 2 INJECTION, POWDER, FOR SOLUTION INTRAVENOUS at 06:38

## 2023-02-25 RX ADMIN — SODIUM CHLORIDE: 9 INJECTION, SOLUTION INTRAVENOUS at 20:24

## 2023-02-25 RX ADMIN — OXYCODONE HYDROCHLORIDE 10 MG: 10 TABLET ORAL at 01:27

## 2023-02-25 RX ADMIN — ONDANSETRON 4 MG: 2 INJECTION INTRAMUSCULAR; INTRAVENOUS at 14:50

## 2023-02-25 RX ADMIN — OXYCODONE HYDROCHLORIDE 10 MG: 10 TABLET ORAL at 06:07

## 2023-02-25 RX ADMIN — OXYCODONE HYDROCHLORIDE 10 MG: 10 TABLET ORAL at 14:57

## 2023-02-25 RX ADMIN — ACETAMINOPHEN 325 MG: 325 TABLET ORAL at 16:47

## 2023-02-25 RX ADMIN — PANTOPRAZOLE SODIUM 40 MG: 40 TABLET, DELAYED RELEASE ORAL at 06:07

## 2023-02-25 RX ADMIN — CIPROFLOXACIN 400 MG: 2 INJECTION, SOLUTION INTRAVENOUS at 04:13

## 2023-02-25 RX ADMIN — CIPROFLOXACIN 400 MG: 2 INJECTION, SOLUTION INTRAVENOUS at 16:46

## 2023-02-25 RX ADMIN — SODIUM CHLORIDE, PRESERVATIVE FREE 10 ML: 5 INJECTION INTRAVENOUS at 08:27

## 2023-02-25 RX ADMIN — CEFEPIME 2000 MG: 2 INJECTION, POWDER, FOR SOLUTION INTRAVENOUS at 20:26

## 2023-02-25 RX ADMIN — CEFEPIME 2000 MG: 2 INJECTION, POWDER, FOR SOLUTION INTRAVENOUS at 12:04

## 2023-02-25 RX ADMIN — ACETAMINOPHEN 650 MG: 325 TABLET ORAL at 21:32

## 2023-02-25 RX ADMIN — OXYCODONE HYDROCHLORIDE 10 MG: 10 TABLET ORAL at 10:13

## 2023-02-25 RX ADMIN — AMLODIPINE BESYLATE 10 MG: 10 TABLET ORAL at 08:28

## 2023-02-25 RX ADMIN — POTASSIUM CHLORIDE 40 MEQ: 1500 TABLET, EXTENDED RELEASE ORAL at 18:03

## 2023-02-25 ASSESSMENT — PAIN DESCRIPTION - DESCRIPTORS
DESCRIPTORS: POUNDING
DESCRIPTORS: ACHING
DESCRIPTORS: DISCOMFORT
DESCRIPTORS: ACHING

## 2023-02-25 ASSESSMENT — PAIN SCALES - WONG BAKER
WONGBAKER_NUMERICALRESPONSE: 0

## 2023-02-25 ASSESSMENT — PAIN SCALES - GENERAL
PAINLEVEL_OUTOF10: 3
PAINLEVEL_OUTOF10: 10
PAINLEVEL_OUTOF10: 4
PAINLEVEL_OUTOF10: 5
PAINLEVEL_OUTOF10: 3
PAINLEVEL_OUTOF10: 7
PAINLEVEL_OUTOF10: 5
PAINLEVEL_OUTOF10: 3
PAINLEVEL_OUTOF10: 3

## 2023-02-25 ASSESSMENT — PAIN DESCRIPTION - PAIN TYPE: TYPE: ACUTE PAIN

## 2023-02-25 ASSESSMENT — PAIN DESCRIPTION - LOCATION
LOCATION: ABDOMEN;HEAD
LOCATION: ABDOMEN

## 2023-02-25 ASSESSMENT — PAIN DESCRIPTION - ORIENTATION
ORIENTATION: UPPER;LOWER;MID
ORIENTATION: UPPER;LOWER
ORIENTATION: MID;UPPER;LOWER

## 2023-02-25 ASSESSMENT — PAIN - FUNCTIONAL ASSESSMENT
PAIN_FUNCTIONAL_ASSESSMENT: ACTIVITIES ARE NOT PREVENTED

## 2023-02-25 NOTE — PROGRESS NOTES
Hospitalist Progress Note      PCP: No primary care provider on file. Date of Admission: 2/17/2023        Hospital Course: 51 female with history of COPD hypertension anxiety drug use admitted with left flank pain. Had acute kidney injury lactic acidosis and culture positive for E. coli with fever. CT scan abdomen done with cortical collection consistent with microabscesses CT-guided aspiration of the left renal abscess ID following    Subjective: Feeling weak but no fever or chills. Still having some flank      Medications:  Reviewed    Infusion Medications    sodium chloride Stopped (02/23/23 1329)    sodium chloride 100 mL/hr at 02/24/23 1155     Scheduled Medications    cefepime  2,000 mg IntraVENous Q8H    amLODIPine  10 mg Oral Daily    ciprofloxacin  400 mg IntraVENous Q12H    pantoprazole  40 mg Oral QAM AC    enoxaparin  40 mg SubCUTAneous Daily    scopolamine  1 patch TransDERmal Q72H    sodium chloride flush  5-40 mL IntraVENous 2 times per day    nicotine  1 patch TransDERmal Daily     PRN Meds: potassium chloride **OR** potassium alternative oral replacement **OR** potassium chloride, docusate sodium, acetaminophen, polyethylene glycol, calcium carbonate, hydrALAZINE, oxyCODONE, morphine, ondansetron, sodium chloride flush, sodium chloride, [DISCONTINUED] acetaminophen **OR** acetaminophen      Intake/Output Summary (Last 24 hours) at 2/25/2023 1338  Last data filed at 2/25/2023 8490  Gross per 24 hour   Intake 240 ml   Output --   Net 240 ml       Physical Exam Performed:    BP (!) 137/95   Pulse (!) 104   Temp 98.7 °F (37.1 °C) (Oral)   Resp 18   Ht 5' 4\" (1.626 m)   Wt 141 lb 1.5 oz (64 kg)   SpO2 99%   BMI 24.22 kg/m²     General appearance: No apparent distress  Neck: Supple  Respiratory:  Normal respiratory effort. Clear to auscultation, bilaterally without Rales/Wheezes/Rhonchi.   Cardiovascular: Regular rate and rhythm with normal S1/S2 without murmurs, rubs or gallops. Abdomen: Soft, non-tender, non-distended   Musculoskeletal: No clubbing, cyanosis  Skin: Skin color, texture, turgor normal.  No rashes or lesions. Neurologic:  No focal weakness   Psychiatric: Alert and oriented  Capillary Refill: Brisk, 3 seconds, normal   Peripheral Pulses: +2 palpable, equal bilaterally       Labs:   Recent Labs     02/23/23  0603 02/24/23  0552 02/25/23  0551   WBC 13.2* 10.8 10.0   HGB 9.7* 9.7* 9.2*   HCT 28.7* 29.2* 27.5*    274 321     Recent Labs     02/23/23  0603 02/24/23  0552 02/25/23  0551   * 135* 134*   K 3.1* 3.5 3.2*    106 105   CO2 17* 20* 19*   BUN 10 8 6*   CREATININE 1.1 1.0 0.9   CALCIUM 7.1* 7.0* 7.3*     Recent Labs     02/23/23  0603 02/24/23  0552 02/25/23  0551   AST 15 16 20   ALT 11 12 16   BILITOT 0.6 0.5 0.4   ALKPHOS 122 112 105     Recent Labs     02/23/23  1405 02/24/23  0552   INR 1.3 1.29*     No results for input(s): Lantigua Penta in the last 72 hours. Urinalysis:      Lab Results   Component Value Date/Time    NITRU POSITIVE 02/17/2023 03:00 PM    WBCUA  02/17/2023 03:00 PM    BACTERIA 2+ 02/17/2023 03:00 PM    RBCUA  02/17/2023 03:00 PM    BLOODU LARGE 02/17/2023 03:00 PM    SPECGRAV 1.020 02/17/2023 03:00 PM    GLUCOSEU Negative 02/17/2023 03:00 PM       Radiology:  CT ASP ABS HEMATOMA BULLA CYST   Final Result   Successful CT guided left kidney abscess aspiration. CT GUIDED NEEDLE PLACEMENT   Final Result   Successful CT guided left kidney abscess aspiration. CT ABDOMEN PELVIS W IV CONTRAST Additional Contrast? None   Final Result   Multiple small low-attenuation left renal cortical collections are most   consistent with microabscesses. The largest of these measures 2.8 cm and is   located in the mid polar region of the left kidney. No hydronephrosis. Increasing right paracolic gutter and pelvic free fluid. Persistent anasarca with small dependent pleural effusions. Aortoiliac atherosclerosis. CT ABDOMEN PELVIS WO CONTRAST Additional Contrast? None   Final Result   Heterogeneous appearance to the left kidney with perinephric fat stranding,   compatible with given history of pyelonephritis. An ill-defined area of   hypodensity is seen in left kidney, similar to prior. This area could   represent a pre-existing cyst or and early abscess-phlegmon      Scattered small retroperitoneal nodes are seen, likely reactive      Findings of fluid overload, increased compared to prior, denoted by small   pleural effusions, body wall anasarca, and mild increased abdominal and   pelvic ascites. CT ABDOMEN PELVIS WO CONTRAST Additional Contrast? None   Final Result   1. Left renal cortical thinning and adjacent perinephric fat stranding with   no hydronephrosis or evident renal stone. The findings are suspicious for   pyelonephritis. Correlation with urinalysis is suggested. 2. No acute findings elsewhere in the abdomen or pelvis. 3. Age advanced atherosclerosis of the abdominal aorta and its branches. 4. Hepatomegaly. 5. Grade 1 anterolisthesis of L5 on S1 related to bilateral L5 spondylolysis   and advanced degenerative disc disease at L5-S1. XR CHEST PORTABLE   Final Result   No radiographic evidence of an acute cardiopulmonary process.              IP CONSULT TO UROLOGY  IP CONSULT TO INFECTIOUS DISEASES    Assessment/Plan:    Active Hospital Problems    Diagnosis     Fever and chills [R50.9]      Priority: Medium    Neutrophilia [D72.9]      Priority: Medium    E coli infection [A49.8]      Priority: Medium    Renal abscess, left [N15.1]      Priority: Medium    Lactic acid acidosis [E87.20]      Priority: Medium    Elevated procalcitonin [R79.89]      Priority: Medium    Septicemia ([address] [A41.9]      Priority: Medium    Sepsis ([address] [A41.9]      Priority: Medium    Pyelonephritis [N12]      Priority: Medium    Acute kidney injury ([address] [N17.9] Priority: Medium     Complicated UTI, continue IV cefepime per ID recommendations on top of ciprofloxacin. Renal abscess, status post drainage, E. coli sensitivity pending  Lactic acidosis improved  Acute kidney injury IV fluid improved  COPD with no acute exacerbation  History of drug use counseled  Tobacco abuse counseled  Essential hypertension, started on amlodipine  Hypokalemia on replacement  Anemia appears chronic no signs of bleeding follow-up as outpatient      Diet: ADULT DIET;  Regular  Code Status: Full Code    Cori Bardales MD

## 2023-02-25 NOTE — PROGRESS NOTES
Infectious Diseases Inpatient Progress Note        CHIEF COMPLAINT:     Sepsis  Left pyelonephritis  Left Kidney abscess  E coli infection          HISTORY OF PRESENT ILLNESS:  51 y.o. woman with a history of anxiety, COPD, hypertension, drug use admitted to hospital secondary to left flank pain fever chills not feeling well. Admission labs indicate creatinine elevated 2.5 lactic acid at 2.6 procalcitonin mild elevation of 19 LFT normal, WBC elevated at 14.5 hemoglobin 13.5, urinalysis abnormal urine culture positive for E. coli sensitivities noted. Fever Tmax 103 since admission. Given ongoing flank pain she underwent repeat CT abdomen pelvis on 2/23 indicating left renal cortical collection consistent with microabscess with the largest abscess measuring about 2.8 cm in the left  midpole region. Interventional radiology consulted she underwent CT-guided aspiration of the left renal abscess. Culture in process. We are consulted for recommendations.   Location : Left flank pain, left abdominal pain     Quality : A darrius       Severity : 10/10  Duration : 3 days     Timing : Constant  Context : UTI  Modifying factors : None  Associated signs and symptoms: Fever, chills, nausea, vomiting    INTERVAL HISTORY : Complains of ongoing left-sided flank pain tolerating antibiotic therapy okay left flank abscess culture positive for E. coli sensitivities reviewed improving slowly no fever no chills WBC trending down      Past Medical History:    Past Medical History:   Diagnosis Date    Anxiety     Arthritis     h/o C6 fx, bulging disc on back    COPD (chronic obstructive pulmonary disease) ([facility])     [place], surgical     High blood pressure     Neck fracture ([facility])     Stroke ([facility])     right facial droop sl drool    Substance abuse ([facility] 10/11/2013       Past Surgical History:    Past Surgical History:   Procedure Laterality Date    ABDOMEN SURGERY      exploratory    ANTERIOR CRUCIATE LIGAMENT REPAIR Left 9/13/2019 LEFT KNEE ARTHROSCOPY, ANTERIOR CRUCIATE LIGAMENT RECONSTRUCTION WITH ACHILLES ALLOGRAFT; MEDIAL MENISCECTOMY REPAIR performed by Graham Naik MD at [address] [facility]  2/23/2023    CT ASP ABS HEMATOMA [facility]A [facility]ST 2/23/2023 [facility] CT    OTHER SURGICAL HISTORY      birthmark removed from face as a 8th grader       Current Medications:    No outpatient medications have been marked as taking for the 2/17/23 encounter [facility] Encounter). Allergies:  Tramadol    Immunizations : There is no immunization history on file for this patient.       Social History:     Social History     Tobacco Use    Smoking status: Every Day     Packs/day: 1.00     Years: 20.00     Pack years: 20.00     Types: Cigarettes    Smokeless tobacco: Never   Vaping Use    Vaping Use: Never used   Substance Use Topics    Alcohol use: No    Drug use: Yes     Types: Marijuana (Weed)     Social History     Tobacco Use   Smoking Status Every Day    Packs/day: 1.00    Years: 20.00    Pack years: 20.00    Types: Cigarettes   Smokeless Tobacco Never      Family History   Problem Relation Age of Onset    Heart Failure Mother         pace maker    COPD Mother     COPD Father     Heart Attack Father     Heart Disease Father         stents/bypass    Cancer Maternal Aunt         breast/lung    Arthritis Other     Diabetes Other     High Blood Pressure Other     Stroke Sister 27    Lupus Sister     Heart Disease Brother     Heart Attack Brother     COPD Maternal Grandmother     No Known Problems Maternal Grandfather     No Known Problems Paternal Grandmother     No Known Problems Paternal Grandfather     Heart Disease Sister     Breast Cancer Maternal Aunt           REVIEW OF SYSTEMS:      Constitutional:  r fevers++ , chills++, night sweats  Eyes:  negative for blurred vision, eye discharge, visual disturbance   HEENT:  negative for hearing loss, ear drainage,nasal congestion  Respiratory:  negative for cough, shortness of breath or hemoptysis   Cardiovascular:  negative for chest pain, palpitations, syncope  Gastrointestinal:  negative for nausea, vomiting, diarrhea, constipation, abdominal pain++ left flank pain  Genitourinary:  negative for frequency, dysuria, urinary incontinence, hematuria  Hematologic/Lymphatic:  negative for easy bruising, bleeding and lymphadenopathy  Allergic/Immunologic:  negative for recurrent infections, angioedema, anaphylaxis   Endocrine:  negative for weight changes, polyuria, polydipsia and polyphagia  Musculoskeletal:  negative for joint  pain, swelling, decreased range of motion  Integumentary: No rashes, skin lesions  Neurological:  negative for headaches, slurred speech, unilateral weakness  Psychiatric: negative for hallucinations,confusion,agitation.      PHYSICAL EXAM:      Vitals:  T max  103  BP (!) 150/93   Pulse (!) 104   Temp 98.6 °F (37 °C) (Oral)   Resp 18   Ht 5' 4\" (1.626 m)   Wt 141 lb 1.5 oz (64 kg)   SpO2 91%   BMI 24.22 kg/m²     General Appearance: alert,in some acute distress++ pallor, no icterus   Skin: warm and dry, no rash or erythema  Head: normocephalic and atraumatic  Eyes: pupils equal, round, and reactive to light, conjunctivae normal  ENT: tympanic membrane, external ear and ear canal normal bilaterally, nose without deformity, nasal mucosa and turbinates normal without polyps  Neck: supple and non-tender without mass, no thyromegaly  no cervical lymphadenopathy  Pulmonary/Chest: clear to auscultation bilaterally- no wheezes, rales or rhonchi, normal air movement, no respiratory distress  Cardiovascular: normal rate, regular rhythm, normal S1 and S2, no murmurs, rubs, clicks, or gallops, no carotid bruits  Abdomen: soft, non-tender, non-distended, normal bowel sounds, no masses or organomegaly  Left flank pain +   Extremities: no cyanosis, clubbing or edema  Musculoskeletal: normal range of motion, no joint swelling, deformity or tenderness  Integumentary: No rashes, no abnormal skin lesions, no petechiae  Neurologic: reflexes normal and symmetric, no cranial nerve deficit  Psych:  Orientation, sensorium, mood normal   Lines: IV    DATA:    CBC:   Lab Results   Component Value Date    WBC 10.0 02/25/2023    HGB 9.2 (L) 02/25/2023    HCT 27.5 (L) 02/25/2023    MCV 90.8 02/25/2023     02/25/2023     RENAL:   Lab Results   Component Value Date    CREATININE 0.9 02/25/2023    BUN 6 (L) 02/25/2023     (L) 02/25/2023    K 3.2 (L) 02/25/2023     02/25/2023    CO2 19 (L) 02/25/2023     SED RATE:   Lab Results   Component Value Date/Time    SEDRATE 27 02/23/2023 12:40 PM     CK:   Lab Results   Component Value Date/Time    CKTOTAL 84 11/05/2013 03:03 PM     CRP:   Lab Results   Component Value Date/Time    .6 02/23/2023 12:40 PM     Hepatic Function Panel:   Lab Results   Component Value Date/Time    ALKPHOS 105 02/25/2023 05:51 AM    ALT 16 02/25/2023 05:51 AM    AST 20 02/25/2023 05:51 AM    PROT 5.2 02/25/2023 05:51 AM    PROT 6.9 09/13/2010 04:48 PM    BILITOT 0.4 02/25/2023 05:51 AM    LABALBU 2.3 02/25/2023 05:51 AM     UA:  Lab Results   Component Value Date/Time    COLORU Yellow 02/17/2023 03:00 PM    CLARITYU CLOUDY 02/17/2023 03:00 PM    GLUCOSEU Negative 02/17/2023 03:00 PM    BILIRUBINUR Negative 02/17/2023 03:00 PM    KETUA Negative 02/17/2023 03:00 PM    SPECGRAV 1.020 02/17/2023 03:00 PM    BLOODU LARGE 02/17/2023 03:00 PM    PHUR 5.5 02/17/2023 03:00 PM    PHUR 5.5 02/17/2023 03:00 PM    PROTEINU >=300 02/17/2023 03:00 PM    UROBILINOGEN 0.2 02/17/2023 03:00 PM    NITRU POSITIVE 02/17/2023 03:00 PM    LEUKOCYTESUR SMALL 02/17/2023 03:00 PM    LABMICR YES 02/17/2023 03:00 PM    URINETYPE Voided 02/17/2023 03:00 PM      Urine Microscopic:   Lab Results   Component Value Date/Time    BACTERIA 2+ 02/17/2023 03:00 PM    WBCUA  02/17/2023 03:00 PM    RBCUA  02/17/2023 03:00 PM    EPIU 21-50 02/17/2023 03:00 PM     Urine Reflex to Culture:   Lab Results   Component Value Date/Time    URRFLXCULT Yes 02/17/2023 03:00 PM       Creat  2.5   Lactic acid  2.5     Procal   19.58 down to   8.68     MICRO: cultures reviewed and updated by me       Blood Culture:   Lab Results   Component Value Date/Time    BC No Growth after 4 days of incubation. 02/17/2023 02:45 PM    BLOODCULT2 No Growth after 4 days of incubation. 02/17/2023 04:50 PM     /23 1445       Order Status: Completed Specimen: Blood Updated: 02/21/23 2215    Blood Culture, Routine No Growth after 4 days of incubation. Narrative:     ORDER#: [id number]                          ORDERED BY: Pihlippe Butcher   SOURCE: Blood Antecubital-Rig              COLLECTED:  02/17/23 14:45   ANTIBIOTICS AT KATHRINE.:                      RECEIVED :  02/17/23 21:31   If child <=2 yrs old please draw pediatric bottle. ~Blood Culture 1   Culture, Blood 2 [[id number]] Collected: 02/17/23 1650   Order Status: Completed Specimen: Blood Updated: 02/21/23 2215    Culture, Blood 2 No Growth after 4 days of incubation. Narrative:     ORDER#: [id number]                          ORDERED BY: Philippe Butcher   SOURCE: Blood                              COLLECTED:  02/17/23 16:50   ANTIBIOTICS AT KATHRINE.:                      RECEIVED :  02/17/23 21:31   If child <=2 yrs old please draw pediatric bottle. ~Blood Culture #2   Culture, Urine [[id number]] (Abnormal)  Collected: 02/17/23 1500   Order Status: Completed Specimen: Urine, clean catch Updated: 02/20/23 0812    Organism Escherichia coli Abnormal     Urine Culture, Routine >100,000 CFU/ml   Narrative:     ORDER#: [id number]                          ORDERED BY: Philippe Butcher   SOURCE: Urine Clean Catch                  COLLECTED:  02/17/23 15:00   ANTIBIOTICS AT KATHRINE.:                      RECEIVED :  02/17/23 21:32   COVID-19, Rapid [[id number]] Collected: 02/17/23 1445   Order Status: Completed Specimen: Nasopharyngeal Swab Updated: 02/17/23 1524    SARS-CoV-2, NAAT Not Detected    Comment: Rapid NAAT:   Negative results should be treated as presumptive and,   if inconsistent with clinical signs and symptoms or necessary for   patient management, should be tested with an alternative molecular   assay. Negative results do not preclude SARS-CoV-2 infection and   should not be used as the sole basis for patient management decisions. This test has been authorized by the FDA under an Emergency Use   Authorization (EUA) for use by authorized laboratories. Fact sheet for Healthcare Providers:   http://www.lily.oneyda/   Fact sheet for Patients: http://www.ligia-nathalie.oneyda/     METHODOLOGY: Isothermal Nucleic Acid Amplification       Culture, Blood 1 [[id number]] Collected: 02/17/23 0000   Order Status: Canceled Specimen: Blood    Culture, Blood 2 [[id number]] Collected: 02/17/23 0000   Order Status: Canceled Specimen: Blood      Susceptibility    Escherichia coli (1)    Antibiotic Interpretation Microscan  Method Status    ampicillin Resistant >=32 mcg/mL BACTERIAL SUSCEPTIBILITY PANEL BY ARI     ampicillin-sulbactam Sensitive 4 mcg/mL BACTERIAL SUSCEPTIBILITY PANEL BY ARI     ceFAZolin Sensitive <=4 mcg/mL BACTERIAL SUSCEPTIBILITY PANEL BY ARI      NOTE: Cefazolin should only be used for uncomplicated UTI         for E.coli or Klebsiella pneumoniae.         cefepime Sensitive <=0.12 mcg/mL BACTERIAL SUSCEPTIBILITY PANEL BY ARI     cefTRIAXone Sensitive <=0.25 mcg/mL BACTERIAL SUSCEPTIBILITY PANEL BY ARI     ciprofloxacin Sensitive <=0.25 mcg/mL BACTERIAL SUSCEPTIBILITY PANEL BY ARI     ertapenem Sensitive <=0.12 mcg/mL BACTERIAL SUSCEPTIBILITY PANEL BY ARI     gentamicin Sensitive <=1 mcg/mL BACTERIAL SUSCEPTIBILITY PANEL BY ARI     levofloxacin Sensitive <=0.12 mcg/mL BACTERIAL SUSCEPTIBILITY PANEL BY ARI     nitrofurantoin Sensitive <=16 mcg/mL BACTERIAL SUSCEPTIBILITY PANEL BY ARI     piperacillin-tazobactam Sensitive <=4 mcg/mL BACTERIAL SUSCEPTIBILITY PANEL BY ARI     trimethoprim-sulfamethoxazole Resistant >=320 mcg/mL BACTERIAL SUSCEPTIBILITY PANEL BY ARI        Narrative    Viral Culture:    Lab Results   Component Value Date/Time    COVID19 Not Detected 02/17/2023 02:45 PM     Urine Culture: No results for input(s): Luvenia Fair in the last 72 hours. Scheduled Meds:   cefepime  2,000 mg IntraVENous Q8H    amLODIPine  10 mg Oral Daily    ciprofloxacin  400 mg IntraVENous Q12H    pantoprazole  40 mg Oral QAM AC    enoxaparin  40 mg SubCUTAneous Daily    scopolamine  1 patch TransDERmal Q72H    sodium chloride flush  5-40 mL IntraVENous 2 times per day    nicotine  1 patch TransDERmal Daily       Continuous Infusions:   sodium chloride Stopped (02/23/23 1329)    sodium chloride 100 mL/hr at 02/24/23 1155       PRN Meds:  docusate sodium, acetaminophen, polyethylene glycol, calcium carbonate, hydrALAZINE, oxyCODONE, morphine, ondansetron, sodium chloride flush, sodium chloride, [DISCONTINUED] acetaminophen **OR** acetaminophen    Imaging:   CT ASP ABS HEMATOMA BULLA CYST   Final Result   Successful CT guided left kidney abscess aspiration. CT GUIDED NEEDLE PLACEMENT   Final Result   Successful CT guided left kidney abscess aspiration. CT ABDOMEN PELVIS W IV CONTRAST Additional Contrast? None   Final Result   Multiple small low-attenuation left renal cortical collections are most   consistent with microabscesses. The largest of these measures 2.8 cm and is   located in the mid polar region of the left kidney. No hydronephrosis. Increasing right paracolic gutter and pelvic free fluid. Persistent anasarca with small dependent pleural effusions. Aortoiliac atherosclerosis. CT ABDOMEN PELVIS WO CONTRAST Additional Contrast? None   Final Result   Heterogeneous appearance to the left kidney with perinephric fat stranding,   compatible with given history of pyelonephritis.   An ill-defined area of   hypodensity is seen in left kidney, similar to prior. This area could   represent a pre-existing cyst or and early abscess-phlegmon      Scattered small retroperitoneal nodes are seen, likely reactive      Findings of fluid overload, increased compared to prior, denoted by small   pleural effusions, body wall anasarca, and mild increased abdominal and   pelvic ascites. CT ABDOMEN PELVIS WO CONTRAST Additional Contrast? None   Final Result   1. Left renal cortical thinning and adjacent perinephric fat stranding with   no hydronephrosis or evident renal stone. The findings are suspicious for   pyelonephritis. Correlation with urinalysis is suggested. 2. No acute findings elsewhere in the abdomen or pelvis. 3. Age advanced atherosclerosis of the abdominal aorta and its branches. 4. Hepatomegaly. 5. Grade 1 anterolisthesis of L5 on S1 related to bilateral L5 spondylolysis   and advanced degenerative disc disease at L5-S1. XR CHEST PORTABLE   Final Result   No radiographic evidence of an acute cardiopulmonary process. All pertinent images and reports for the current Hospitalization were reviewed by me. IMPRESSION:    Patient Active Problem List   Diagnosis    Acromioclavicular joint arthritis    Rotator cuff tendonitis    Mood disorder (HCC)    Substance abuse ([address]    Suicidal ideation    Left anterior cruciate ligament tear    Tear of meniscus of knee joint    Sepsis ([address]    Pyelonephritis    Acute kidney injury ([address]    Fever and chills    Neutrophilia    E coli infection    Renal abscess, left    Lactic acid acidosis    Elevated procalcitonin    Septicemia (HCC)     Sepsis resolving  Complicated urinary tract infection  Blood pyelonephritis  Left renal abscess  E. coli urinary tract infection  Lactic acidosis  WBC elevation  Fever and chills improving  COPD  Smoking  History of drug use  Status post IR guided aspiration of the left renal abscess. Abscess culture requested in process.     Blood cultures remain negative but has ongoing left flank pain with a left pyelonephritis complicated with a left renal abscess. Status post IR guided aspiration. Abscess culture in process. Tolerating antibiotic therapy okay. E. coli sensitivities reviewed. Agree with current antibiotic choices. Left renal aspiration culture positive for E. coli sensitivities pending    Labs, Microbiology, Radiology and pertinent results from current hospitalization and care every where were reviewed by me as a part of the consultation. PLAN :  1.  Continue IV cefepime 2 g every 8 -   2. Continue ciprofloxacin 400 mg Q 12 hrs  3. IR abscess cx E. coli noted sensitivities reviewed  4. Check HIV screen -ve, hepatitis panel negative  5. ESR, CRP noted  6. PROCAL  7. NO renal STONES -she may have passed a stone  8. Home-going on oral ciprofloxacin 750 mg twice a day for 14 days  9. Okay for DC when okay with primary team    Discussed with patient/Family and Nursing   Risk of Complications/Morbidity: High      Illness(es)/ Infection present that pose threat to bodily function. There is potential for severe exacerbation of infection/side effects of treatment. Therapy requires intensive monitoring for antimicrobial agent toxicity. Thanks for allowing me to participate in your patient's care please call me with any questions or concerns.     Dr. Carlito Kurtz MD  [address] [place] [address] Physician  Phone: [phone]   Fax : [phone]

## 2023-02-26 VITALS
OXYGEN SATURATION: 97 % | BODY MASS INDEX: 21.72 KG/M2 | DIASTOLIC BLOOD PRESSURE: 88 MMHG | WEIGHT: 127.21 LBS | HEART RATE: 88 BPM | RESPIRATION RATE: 18 BRPM | TEMPERATURE: 98.2 F | SYSTOLIC BLOOD PRESSURE: 145 MMHG | HEIGHT: 64 IN

## 2023-02-26 LAB
ANION GAP SERPL CALCULATED.3IONS-SCNC: 10 MMOL/L (ref 3–16)
BASOPHILS ABSOLUTE: 0 K/UL (ref 0–0.2)
BASOPHILS RELATIVE PERCENT: 0.5 %
BUN BLDV-MCNC: 6 MG/DL (ref 7–20)
CALCIUM SERPL-MCNC: 7.7 MG/DL (ref 8.3–10.6)
CHLORIDE BLD-SCNC: 104 MMOL/L (ref 99–110)
CO2: 21 MMOL/L (ref 21–32)
CREAT SERPL-MCNC: 0.9 MG/DL (ref 0.6–1.1)
EOSINOPHILS ABSOLUTE: 0.1 K/UL (ref 0–0.6)
EOSINOPHILS RELATIVE PERCENT: 0.8 %
GFR SERPL CREATININE-BSD FRML MDRD: >60 ML/MIN/{1.73_M2}
GLUCOSE BLD-MCNC: 104 MG/DL (ref 70–99)
HCT VFR BLD CALC: 29.9 % (ref 36–48)
HEMOGLOBIN: 10.4 G/DL (ref 12–16)
LYMPHOCYTES ABSOLUTE: 0.9 K/UL (ref 1–5.1)
LYMPHOCYTES RELATIVE PERCENT: 10.6 %
MCH RBC QN AUTO: 31.1 PG (ref 26–34)
MCHC RBC AUTO-ENTMCNC: 34.7 G/DL (ref 31–36)
MCV RBC AUTO: 89.6 FL (ref 80–100)
MONOCYTES ABSOLUTE: 0.8 K/UL (ref 0–1.3)
MONOCYTES RELATIVE PERCENT: 8.6 %
NEUTROPHILS ABSOLUTE: 7 K/UL (ref 1.7–7.7)
NEUTROPHILS RELATIVE PERCENT: 79.5 %
PDW BLD-RTO: 14.3 % (ref 12.4–15.4)
PLATELET # BLD: 404 K/UL (ref 135–450)
PMV BLD AUTO: 8.1 FL (ref 5–10.5)
POTASSIUM SERPL-SCNC: 4.1 MMOL/L (ref 3.5–5.1)
RBC # BLD: 3.33 M/UL (ref 4–5.2)
SODIUM BLD-SCNC: 135 MMOL/L (ref 136–145)
WBC # BLD: 8.9 K/UL (ref 4–11)

## 2023-02-26 PROCEDURE — 94760 N-INVAS EAR/PLS OXIMETRY 1: CPT

## 2023-02-26 PROCEDURE — 6370000000 HC RX 637 (ALT 250 FOR IP): Performed by: STUDENT IN AN ORGANIZED HEALTH CARE EDUCATION/TRAINING PROGRAM

## 2023-02-26 PROCEDURE — 6360000002 HC RX W HCPCS: Performed by: HOSPITALIST

## 2023-02-26 PROCEDURE — 80048 BASIC METABOLIC PNL TOTAL CA: CPT

## 2023-02-26 PROCEDURE — 6360000002 HC RX W HCPCS: Performed by: INTERNAL MEDICINE

## 2023-02-26 PROCEDURE — 2580000003 HC RX 258: Performed by: INTERNAL MEDICINE

## 2023-02-26 PROCEDURE — 6370000000 HC RX 637 (ALT 250 FOR IP): Performed by: HOSPITALIST

## 2023-02-26 PROCEDURE — 6370000000 HC RX 637 (ALT 250 FOR IP): Performed by: RADIOLOGY

## 2023-02-26 PROCEDURE — 6360000002 HC RX W HCPCS: Performed by: STUDENT IN AN ORGANIZED HEALTH CARE EDUCATION/TRAINING PROGRAM

## 2023-02-26 PROCEDURE — 36415 COLL VENOUS BLD VENIPUNCTURE: CPT

## 2023-02-26 PROCEDURE — 85025 COMPLETE CBC W/AUTO DIFF WBC: CPT

## 2023-02-26 PROCEDURE — 6370000000 HC RX 637 (ALT 250 FOR IP): Performed by: NURSE PRACTITIONER

## 2023-02-26 RX ORDER — AMLODIPINE BESYLATE 10 MG/1
10 TABLET ORAL DAILY
Qty: 30 TABLET | Refills: 0 | Status: SHIPPED | OUTPATIENT
Start: 2023-02-27

## 2023-02-26 RX ORDER — CIPROFLOXACIN 750 MG/1
750 TABLET, FILM COATED ORAL 2 TIMES DAILY
Qty: 28 TABLET | Refills: 0 | Status: SHIPPED | OUTPATIENT
Start: 2023-02-26 | End: 2023-03-12

## 2023-02-26 RX ADMIN — CIPROFLOXACIN 400 MG: 2 INJECTION, SOLUTION INTRAVENOUS at 10:19

## 2023-02-26 RX ADMIN — ONDANSETRON 4 MG: 2 INJECTION INTRAMUSCULAR; INTRAVENOUS at 11:45

## 2023-02-26 RX ADMIN — OXYCODONE HYDROCHLORIDE 10 MG: 10 TABLET ORAL at 01:28

## 2023-02-26 RX ADMIN — PANTOPRAZOLE SODIUM 40 MG: 40 TABLET, DELAYED RELEASE ORAL at 05:39

## 2023-02-26 RX ADMIN — CEFEPIME 2000 MG: 2 INJECTION, POWDER, FOR SOLUTION INTRAVENOUS at 12:15

## 2023-02-26 RX ADMIN — CEFEPIME 2000 MG: 2 INJECTION, POWDER, FOR SOLUTION INTRAVENOUS at 05:45

## 2023-02-26 RX ADMIN — ACETAMINOPHEN 650 MG: 325 TABLET ORAL at 04:12

## 2023-02-26 RX ADMIN — ONDANSETRON 4 MG: 2 INJECTION INTRAMUSCULAR; INTRAVENOUS at 05:39

## 2023-02-26 RX ADMIN — OXYCODONE HYDROCHLORIDE 10 MG: 10 TABLET ORAL at 05:38

## 2023-02-26 RX ADMIN — OXYCODONE HYDROCHLORIDE 10 MG: 10 TABLET ORAL at 09:42

## 2023-02-26 RX ADMIN — AMLODIPINE BESYLATE 10 MG: 10 TABLET ORAL at 08:27

## 2023-02-26 RX ADMIN — CIPROFLOXACIN 400 MG: 2 INJECTION, SOLUTION INTRAVENOUS at 04:15

## 2023-02-26 RX ADMIN — SODIUM CHLORIDE: 9 INJECTION, SOLUTION INTRAVENOUS at 05:45

## 2023-02-26 ASSESSMENT — PAIN DESCRIPTION - DESCRIPTORS
DESCRIPTORS: ACHING

## 2023-02-26 ASSESSMENT — PAIN DESCRIPTION - ORIENTATION
ORIENTATION: UPPER;LOWER
ORIENTATION: UPPER;LOWER;MID
ORIENTATION: MID
ORIENTATION: UPPER;LOWER

## 2023-02-26 ASSESSMENT — PAIN SCALES - GENERAL
PAINLEVEL_OUTOF10: 5
PAINLEVEL_OUTOF10: 6
PAINLEVEL_OUTOF10: 0
PAINLEVEL_OUTOF10: 0
PAINLEVEL_OUTOF10: 5
PAINLEVEL_OUTOF10: 5
PAINLEVEL_OUTOF10: 4
PAINLEVEL_OUTOF10: 4
PAINLEVEL_OUTOF10: 5
PAINLEVEL_OUTOF10: 4

## 2023-02-26 ASSESSMENT — PAIN DESCRIPTION - LOCATION
LOCATION: ABDOMEN;HEAD
LOCATION: ABDOMEN

## 2023-02-26 ASSESSMENT — PAIN SCALES - WONG BAKER
WONGBAKER_NUMERICALRESPONSE: 4
WONGBAKER_NUMERICALRESPONSE: 4
WONGBAKER_NUMERICALRESPONSE: 0
WONGBAKER_NUMERICALRESPONSE: 2
WONGBAKER_NUMERICALRESPONSE: 4
WONGBAKER_NUMERICALRESPONSE: 0

## 2023-02-26 ASSESSMENT — PAIN - FUNCTIONAL ASSESSMENT
PAIN_FUNCTIONAL_ASSESSMENT: ACTIVITIES ARE NOT PREVENTED
PAIN_FUNCTIONAL_ASSESSMENT: ACTIVITIES ARE NOT PREVENTED
PAIN_FUNCTIONAL_ASSESSMENT: PREVENTS OR INTERFERES SOME ACTIVE ACTIVITIES AND ADLS
PAIN_FUNCTIONAL_ASSESSMENT: ACTIVITIES ARE NOT PREVENTED

## 2023-02-26 NOTE — DISCHARGE SUMMARY
Hospital Medicine Discharge Summary    Patient ID: Gilda Moss      Patient's PCP: No primary care provider on file. Admit Date: 2/17/2023     Discharge Date:   02/26/2023    Admitting Provider: No admitting provider for patient encounter. Discharge Provider: Vickie Galo MD     Discharge Diagnoses: Active Hospital Problems    Diagnosis     Fever and chills [R50.9]      Priority: Medium    Neutrophilia [D72.9]      Priority: Medium    E coli infection [A49.8]      Priority: Medium    Renal abscess, left [N15.1]      Priority: Medium    Lactic acid acidosis [E87.20]      Priority: Medium    Elevated procalcitonin [R79.89]      Priority: Medium    Septicemia ([address] [A41.9]      Priority: Medium    Sepsis ([address] [A41.9]      Priority: Medium    Pyelonephritis [N12]      Priority: Medium    Acute kidney injury ([address] [N17.9]      Priority: Medium       The patient was seen and examined on day of discharge and this discharge summary is in conjunction with any daily progress note from day of discharge. Hospital Course:     From HPI:\"48 y.o. female with PMHx of Anxiety, COPD, HTN and substance abuse presented to [facility] for management of sepsis with pyelonephritis. Pt presented to [place] ED with fever and lower abdominal pain present for several day.  + nausea and vomiting yesterday. No urinary symptoms. + Suprapubic abdominal pain\"      Complicated UTI, continue IV cefepime per ID recommendations on top of ciprofloxacin.   PT recommending now discharged on ciprofloxacin 750 mg p.o. twice daily for 14 days added, patient advised to seek immediate medical help in case of any worsening, follow-up with PCP and urology as outpatient  Renal abscess, status post drainage  Lactic acidosis improved  Acute kidney injury IV fluid improved  COPD with no acute exacerbation  History of drug use counseled  Tobacco abuse counseled  Essential hypertension, started on amlodipine  Anemia appears chronic no signs of bleeding follow-up as outpatient        Physical Exam Performed:     BP (!) 145/88   Pulse 88   Temp 98.2 °F (36.8 °C) (Oral)   Resp 18   Ht 5' 4\" (1.626 m)   Wt 127 lb 3.3 oz (57.7 kg)   SpO2 97%   PF (!) 0 L/min   BMI 21.83 kg/m²       General appearance:  No apparent distress  HEENT:  Normal cephalic  Neck: Supple  Respiratory:  Normal respiratory effort. Clear to auscultation, bilaterally without Rales/Wheezes/Rhonchi. Cardiovascular:  Regular rate and rhythm with normal S1/S2 without murmurs, rubs or gallops. Abdomen: Soft, non-tender  Musculoskeletal:  No clubbing, cyanosis   Skin: Skin color, texture, turgor normal.  No rashes or lesions. Neurologic:  No focal weakness   Psychiatric:  Alert and oriented  Capillary Refill: Brisk,< 3 seconds   Peripheral Pulses: +2 palpable, equal bilaterally       Labs: For convenience and continuity at follow-up the following most recent labs are provided:      CBC:    Lab Results   Component Value Date/Time    WBC 8.9 02/26/2023 05:55 AM    HGB 10.4 02/26/2023 05:55 AM    HCT 29.9 02/26/2023 05:55 AM     02/26/2023 05:55 AM       Renal:    Lab Results   Component Value Date/Time     02/26/2023 05:55 AM    K 4.1 02/26/2023 05:55 AM    K 3.9 02/17/2023 02:40 PM     02/26/2023 05:55 AM    CO2 21 02/26/2023 05:55 AM    BUN 6 02/26/2023 05:55 AM    CREATININE 0.9 02/26/2023 05:55 AM    CALCIUM 7.7 02/26/2023 05:55 AM         Significant Diagnostic Studies    Radiology:   CT ASP ABS HEMATOMA BULLA CYST   Final Result   Successful CT guided left kidney abscess aspiration. CT GUIDED NEEDLE PLACEMENT   Final Result   Successful CT guided left kidney abscess aspiration. CT ABDOMEN PELVIS W IV CONTRAST Additional Contrast? None   Final Result   Multiple small low-attenuation left renal cortical collections are most   consistent with microabscesses.   The largest of these measures 2.8 cm and is located in the mid polar region of the left kidney. No hydronephrosis. Increasing right paracolic gutter and pelvic free fluid. Persistent anasarca with small dependent pleural effusions. Aortoiliac atherosclerosis. CT ABDOMEN PELVIS WO CONTRAST Additional Contrast? None   Final Result   Heterogeneous appearance to the left kidney with perinephric fat stranding,   compatible with given history of pyelonephritis. An ill-defined area of   hypodensity is seen in left kidney, similar to prior. This area could   represent a pre-existing cyst or and early abscess-phlegmon      Scattered small retroperitoneal nodes are seen, likely reactive      Findings of fluid overload, increased compared to prior, denoted by small   pleural effusions, body wall anasarca, and mild increased abdominal and   pelvic ascites. CT ABDOMEN PELVIS WO CONTRAST Additional Contrast? None   Final Result   1. Left renal cortical thinning and adjacent perinephric fat stranding with   no hydronephrosis or evident renal stone. The findings are suspicious for   pyelonephritis. Correlation with urinalysis is suggested. 2. No acute findings elsewhere in the abdomen or pelvis. 3. Age advanced atherosclerosis of the abdominal aorta and its branches. 4. Hepatomegaly. 5. Grade 1 anterolisthesis of L5 on S1 related to bilateral L5 spondylolysis   and advanced degenerative disc disease at L5-S1. XR CHEST PORTABLE   Final Result   No radiographic evidence of an acute cardiopulmonary process.                 Consults:     IP CONSULT TO UROLOGY  IP CONSULT TO INFECTIOUS DISEASES    Disposition:  home     Condition at Discharge: Stable    Discharge Instructions/Follow-up:  PCP, Urology    Code Status:  Full Code     Activity: activity as tolerated    Diet: regular diet      Discharge Medications:     Current Discharge Medication List             Details   amLODIPine (NORVASC) 10 MG tablet Take 1 tablet by mouth daily  Qty: 30 tablet, Refills: 0      ciprofloxacin (CIPRO) 750 MG tablet Take 1 tablet by mouth 2 times daily for 14 days  Qty: 28 tablet, Refills: 0                Details   ibuprofen (ADVIL;MOTRIN) 800 MG tablet Take 1 tablet by mouth 3 times daily (with meals)  Qty: 90 tablet, Refills: 0             Time Spent on discharge: 45 minutes in the examination, evaluation, counseling and review of medications and discharge plan. Signed:    Cori Bardales MD   2/26/2023      Thank you No primary care provider on file. for the opportunity to be involved in this patient's care. If you have any questions or concerns, please feel free to contact me at [phone].

## 2023-02-26 NOTE — PROGRESS NOTES
Discharge instructions reviewed with patient, discussed medications, VU, denies any further questions or anxiety related to discharge. IV/tele discontinued. Pt discharged to home alone. Taken from room via wheelchair by this RN, personal belongings taken with. New medications supplied through outpatient pharmacy.      Electronically signed by Ritu Negron RN on 2/26/2023 at 1:57 PM

## 2023-02-26 NOTE — PLAN OF CARE
Problem: Discharge Planning  Goal: Discharge to home or other facility with appropriate resources  Outcome: Progressing  Flowsheets (Taken 2/25/2023 2231)  Discharge to home or other facility with appropriate resources: Identify barriers to discharge with patient and caregiver     Problem: Pain  Goal: Verbalizes/displays adequate comfort level or baseline comfort level  Outcome: Progressing  Flowsheets (Taken 2/25/2023 2330)  Verbalizes/displays adequate comfort level or baseline comfort level:   Encourage patient to monitor pain and request assistance   Assess pain using appropriate pain scale   Administer analgesics based on type and severity of pain and evaluate response   Implement non-pharmacological measures as appropriate and evaluate response     Problem: Cardiovascular - Adult  Goal: Maintains optimal cardiac output and hemodynamic stability  Outcome: Progressing  Flowsheets (Taken 2/25/2023 2330)  Maintains optimal cardiac output and hemodynamic stability:   Monitor blood pressure and heart rate   Assess for signs of decreased cardiac output     Problem: Infection - Adult  Goal: Absence of infection at discharge  Outcome: Progressing  Flowsheets (Taken 2/25/2023 2330)  Absence of infection at discharge:   Assess and monitor for signs and symptoms of infection   Monitor lab/diagnostic results   Monitor all insertion sites i.e., indwelling lines, tubes and drains

## 2023-02-26 NOTE — CARE COORDINATION
CASE MANAGEMENT DISCHARGE SUMMARY:    DISCHARGE DATE: 2/26/23    DISCHARGED TO HOME     TRANSPORTATION: private car    Home on po [facility]              Electronically signed by Oscar Mann on 2/26/2023 at 12:34 PM   #[phone]

## 2023-02-26 NOTE — PLAN OF CARE
Problem: Discharge Planning  Goal: Discharge to home or other facility with appropriate resources  2/26/2023 1226 by Juan Razo RN  Outcome: Completed  2/26/2023 0841 by Juan Razo RN  Outcome: Progressing  2/25/2023 2330 by Keanu Johnston RN  Outcome: Progressing  Flowsheets (Taken 2/25/2023 2231)  Discharge to home or other facility with appropriate resources: Identify barriers to discharge with patient and caregiver     Problem: Chronic Conditions and Co-morbidities  Goal: Patient's chronic conditions and co-morbidity symptoms are monitored and maintained or improved  2/26/2023 1226 by Juan Razo RN  Outcome: Completed  2/26/2023 0841 by Juan Razo RN  Outcome: Progressing     Problem: Cardiovascular - Adult  Goal: Maintains optimal cardiac output and hemodynamic stability  2/26/2023 1226 by Juan Razo RN  Outcome: Completed  2/26/2023 0841 by Juan Razo RN  Outcome: Progressing  2/25/2023 2330 by Keanu Johnston RN  Outcome: Progressing  Flowsheets (Taken 2/25/2023 2330)  Maintains optimal cardiac output and hemodynamic stability:   Monitor blood pressure and heart rate   Assess for signs of decreased cardiac output  Goal: Absence of cardiac dysrhythmias or at baseline  2/26/2023 1226 by Juan Razo RN  Outcome: Completed  2/26/2023 0841 by Juan Razo RN  Outcome: Progressing

## 2023-02-26 NOTE — PLAN OF CARE
Problem: Discharge Planning  Goal: Discharge to home or other facility with appropriate resources  2/26/2023 0841 by Cruzito Simpson RN  Outcome: Progressing  2/25/2023 2330 by Donavan Garg RN  Outcome: Progressing  Flowsheets (Taken 2/25/2023 2231)  Discharge to home or other facility with appropriate resources: Identify barriers to discharge with patient and caregiver     Problem: Pain  Goal: Verbalizes/displays adequate comfort level or baseline comfort level  2/26/2023 0841 by Cruzito Simpson RN  Outcome: Progressing  2/25/2023 2330 by Donavan Garg RN  Outcome: Progressing  Flowsheets (Taken 2/25/2023 2330)  Verbalizes/displays adequate comfort level or baseline comfort level:   Encourage patient to monitor pain and request assistance   Assess pain using appropriate pain scale   Administer analgesics based on type and severity of pain and evaluate response   Implement non-pharmacological measures as appropriate and evaluate response     Problem: Chronic Conditions and Co-morbidities  Goal: Patient's chronic conditions and co-morbidity symptoms are monitored and maintained or improved  Outcome: Progressing     Problem: Cardiovascular - Adult  Goal: Maintains optimal cardiac output and hemodynamic stability  2/26/2023 0841 by Cruzito Simpson RN  Outcome: Progressing  2/25/2023 2330 by Donavan Garg RN  Outcome: Progressing  Flowsheets (Taken 2/25/2023 2330)  Maintains optimal cardiac output and hemodynamic stability:   Monitor blood pressure and heart rate   Assess for signs of decreased cardiac output  Goal: Absence of cardiac dysrhythmias or at baseline  Outcome: Progressing

## 2023-02-28 LAB
ANAEROBIC CULTURE: ABNORMAL
GRAM STAIN RESULT: ABNORMAL
ORGANISM: ABNORMAL
WOUND/ABSCESS: ABNORMAL

## (undated) DEVICE — 4-PORT MANIFOLD: Brand: NEPTUNE 2

## (undated) DEVICE — Z INACTIVE USE 2660664 SOLUTION IRRIG 3000ML 0.9% SOD CHL USP UROMATIC PLAS CONT

## (undated) DEVICE — TUBING, SUCTION, 1/4" X 12', STRAIGHT: Brand: MEDLINE

## (undated) DEVICE — SUTURE ABSORBABLE BRAIDED 2-0 CT-1 27 IN UD VICRYL J259H

## (undated) DEVICE — CHLORAPREP 26ML ORANGE

## (undated) DEVICE — 3M™ TEGADERM™ TRANSPARENT FILM DRESSING FRAME STYLE, 1626W, 4 IN X 4-3/4 IN (10 CM X 12 CM), 50/CT 4CT/CASE: Brand: 3M™ TEGADERM™

## (undated) DEVICE — [AGGRESSIVE PLUS CUTTER, ARTHROSCOPIC SHAVER BLADE,  DO NOT RESTERILIZE,  DO NOT USE IF PACKAGE IS DAMAGED,  KEEP DRY,  KEEP AWAY FROM SUNLIGHT]: Brand: FORMULA

## (undated) DEVICE — REAMER SURG DIA9MM LO PROF FOR MED PORTAL TECH ACL RECON W/

## (undated) DEVICE — ELECTRODE PT RET AD L9FT HI MOIST COND ADH HYDRGEL CORDED

## (undated) DEVICE — SUTURE MCRYL SZ 4-0 L18IN ABSRB UD L19MM PS-2 3/8 CIR PRIM Y496G

## (undated) DEVICE — SHEET,DRAPE,53X77,STERILE: Brand: MEDLINE

## (undated) DEVICE — COUNTER NDL 40 COUNT HLD 70 NUM FOAM BLK SGL MAG W BLDE REMV

## (undated) DEVICE — SUTURE FIBERLOOP SZ 2-0 L20IN NONABSORBABLE BLU STR NDL AR7234

## (undated) DEVICE — HYPODERMIC SAFETY NEEDLE: Brand: MAGELLAN

## (undated) DEVICE — FAST-FIX 360 STRAIGHT KNOT                                    PUSHER/CUTTER AND SLOTTED CANNULA SETS: Brand: FAST-FIX

## (undated) DEVICE — SOLUTION IV IRRIG POUR BRL 0.9% SODIUM CHL 2F7124

## (undated) DEVICE — STERILE PATIENT PROTECTIVE PAD FOR IMP® KNEE POSITIONERS & COHESIVE WRAP (10 / CASE): Brand: DE MAYO KNEE POSITIONER®

## (undated) DEVICE — COVER LT HNDL BLU PLAS

## (undated) DEVICE — INTENDED FOR TISSUE SEPARATION, AND OTHER PROCEDURES THAT REQUIRE A SHARP SURGICAL BLADE TO PUNCTURE OR CUT.: Brand: BARD-PARKER ® STAINLESS STEEL BLADES

## (undated) DEVICE — 3M™ COBAN™ NL STERILE NON-LATEX SELF-ADHERENT WRAP, 2086S, 6 IN X 5 YD (15 CM X 4,5 M), 12 ROLLS/CASE: Brand: 3M™ COBAN™

## (undated) DEVICE — SYRINGE, LUER LOCK, 60ML: Brand: MEDLINE

## (undated) DEVICE — [STANDARD 12-FLUTE BARREL BUR, ARTHROSCOPIC SHAVER BLADE,  DO NOT RESTERILIZE,  DO NOT USE IF PACKAGE IS DAMAGED,  KEEP DRY,  KEEP AWAY FROM SUNLIGHT]: Brand: FORMULA

## (undated) DEVICE — Z DISCONTINUED USE 2275686 GLOVE SURG SZ 8 L12IN FNGR THK13MIL WHT ISOLEX POLYISOPRENE

## (undated) DEVICE — 3M™ STERI-DRAPE™ U-DRAPE 1015: Brand: STERI-DRAPE™

## (undated) DEVICE — AVANOS* TUOHY EPIDURAL NEEDLE: Brand: AVANOS

## (undated) DEVICE — Device

## (undated) DEVICE — ELECTROSURGICAL PENCIL BUTTON SWITCH NON COATED BLADE ELECTRODE 10 FT (3 M) CORD HOLSTER: Brand: MEGADYNE

## (undated) DEVICE — SPONGE LAP W18XL18IN WHT COT 4 PLY FLD STRUNG RADPQ DISP ST

## (undated) DEVICE — KIT INSTR TRANSTIBIAL CRUCE W/O SAW BLDE DISP

## (undated) DEVICE — SUTURE VCRL SZ 0 L27IN ABSRB UD L36MM CT-1 1/2 CIR J260H

## (undated) DEVICE — ZIMMER® STERILE DISPOSABLE TOURNIQUET CUFF WITH PLC, DUAL PORT, SINGLE BLADDER, 34 IN. (86 CM)

## (undated) DEVICE — Z INACTIVE USE 2847792 BLADE SHV L13CM DIA35MM DISECT AGG COOL

## (undated) DEVICE — PIN DRL DIA4MM ACL CLS EYELET FOR FEM FIX SYS TIGHTROPE

## (undated) DEVICE — GOWN SIRUS NONREIN XL W/TWL: Brand: MEDLINE INDUSTRIES, INC.

## (undated) DEVICE — SYRINGE MED 10ML LUERLOCK TIP W/O SFTY DISP

## (undated) DEVICE — SUTURE FIBERTAPE SZ 2-0 L36IN NONABSORBABLE BLU 2MM EA END AR7237

## (undated) DEVICE — SUTURE PROL SZ 2-0 L30IN NONABSORBABLE BLU L26MM SH 1/2 CIR 8833H

## (undated) DEVICE — COVER,TABLE,77X90,STERILE: Brand: MEDLINE

## (undated) DEVICE — KIT OR ROOM TURNOVER W/STRAP

## (undated) DEVICE — GLOVE ORANGE PI 7 1/2   MSG9075

## (undated) DEVICE — ELECTRODE ABLAT SUCT FOR VAPR SYS PREMIERE 50

## (undated) DEVICE — 3M™ TEGADERM™ TRANSPARENT FILM DRESSING FRAME STYLE, 1626, 4 IN X 4-3/4 IN (10 CM X 12 CM), 50/CT 4CT/CASE: Brand: 3M™ TEGADERM™

## (undated) DEVICE — NEEDLE HYPO 18GA L1.5IN THN WALL PIVOTING SHLD BVL ORIENTED

## (undated) DEVICE — STRIP,CLOSURE,WOUND,MEDI-STRIP,1/2X4: Brand: MEDLINE